# Patient Record
Sex: FEMALE | Race: WHITE | NOT HISPANIC OR LATINO | Employment: FULL TIME | URBAN - METROPOLITAN AREA
[De-identification: names, ages, dates, MRNs, and addresses within clinical notes are randomized per-mention and may not be internally consistent; named-entity substitution may affect disease eponyms.]

---

## 2017-01-25 ENCOUNTER — TELEPHONE (OUTPATIENT)
Dept: FAMILY MEDICINE | Facility: CLINIC | Age: 57
End: 2017-01-25

## 2017-01-25 NOTE — TELEPHONE ENCOUNTER
Panel Management Review      Patient has the following on her problem list:     Depression / Dysthymia review  PHQ-9 SCORE 12/19/2014 9/17/2015 8/3/2016   Total Score 24 - -   Total Score - 16 25      Patient is due for:  PHQ9      Composite cancer screening  Chart review shows that this patient is due/due soon for the following Pap Smear, Mammogram and Fecal Colorectal (FIT)  Summary:    Patient is due/failing the following:   FIT, MAMMOGRAM, PAP and PHQ9    Action needed:   Patient needs office visit for physical,mammo,FIT phq-9.    Type of outreach:    Sent letter.    Questions for provider review:    None                                                                                                                                    Karolina Banerjee ma       Chart routed to Care Team .

## 2017-01-25 NOTE — Clinical Note
January 25, 2017    Nataliia Flanagan  [address] [facility] [address]    Dear Nataliia    We care about your health and have reviewed your health plan. We have reviewed your medical conditions, medication list, and lab results and are making recommendations based on this review, to better manage your health.    You are in particular need of attention regarding:  - Your Depression  - Scheduling a Breast Cancer Screening (Mammography) [phone]  - Completing a Colon Cancer Screening (FIT) - Please complete FIT test a test to check for blood in your stool and mail completed test to clinic.  - Scheduling a Physical with a Cervical Cancer Screening (Pap Smear) age 64 and younger [phone]      Here is a list of Health Maintenance topics that are due now or due soon:  Health Maintenance Due   Topic Date Due     TETANUS IMMUNIZATION (SYSTEM ASSIGNED)  11/22/1978     ADVANCE DIRECTIVE PLANNING Q5 YRS (NO INBASKET)  11/22/1978     HEPATITIS C SCREENING  11/22/1978     PAP SCREENING Q3 YR (SYSTEM ASSIGNED)  11/22/1981     MAMMO SCREEN Q2 YR (SYSTEM ASSIGNED)  11/22/2000     DEPRESSION ACTION PLAN Q1 YR (NO INBASKET)  05/01/2015     FIT Q1 YR (NO INBASKET)  05/19/2015     INFLUENZA VACCINE (SYSTEM ASSIGNED)  09/01/2016     PHQ-9 Q6 MONTHS (NO INBASKET)  02/03/2017     We will be calling you in the next couple of weeks to help you schedule any appointments that are needed.  Please call us at [phone] (or use [employer]) to address the above recommendations.     Thank you for trusting [facility] and we appreciate the opportunity to serve you.  We look forward to supporting your healthcare needs in the future.    Healthy Regards,    [facility]

## 2017-01-25 NOTE — Clinical Note
February 9, 2017    Nataliia Flanagan  [address] [place] [facility] [place] [place] [address]      Dear Nataliia Flanagan,     We have tried to contact you about your health, but have been unable to reach you.  Please call us as soon as possible so we can provide you with the best care possible.  We will continue to check in with you throughout the year to complete these items of care, if you are not able to complete these items at this time.  If you would like to complete the missing items for your care, please contact us at [phone].    We recommend the following:  -schedule a MAMMOGRAM 1 in 8 women will develop invasive breast cancer during her lifetime and it is the most common non-skin cancer in American women.  EARLY detection, new treatments, and a better understanding of the disease have increased survival rates - the 5 year survival rate in the 1960s was 63% and today it is close to 90% .  Please disregard this reminder if you have had this exam elsewhere within the last year.  It would be helpful for us to have a copy of your mammogram report in our file so that we can best coordinate your care - please contact us with when your test was done so we can update your record. Please call [phone] to schedule your mammogram today.   -complete a FIT TEST a FIT test or Fecal Immunochemical Occult Blood Test is a take home stool sample kit.  It does not replace the colonoscopy for colorectal cancer screening, but it can detect hidden bleeding in the lower colon.  It does need to be repeated every year and if a positive result is obtained, you would be referred for a colonoscopy.  If you have completed either one of these tests at another facility, please have the records sent to our clinic so that we can best coordinate your care.   Due for a physical with a pap     Sincerely,     Your Care Team at [place]

## 2017-02-27 NOTE — PROGRESS NOTES
"  SUBJECTIVE:                                                    Nataliia Flanagan is a 56 year old female who presents to clinic today for the following health issues:    Rash/SORE     Onset: Age of 18    Description:   Location: lower left side of stomach  Character: round, raised, painful, draining, red, bleeding  Itching (Pruritis): no     Progression of Symptoms:  worsening and constant    Accompanying Signs & Symptoms:  Fever: no   Body aches or joint pain: no   Sore throat symptoms: no   Recent cold symptoms: no    History:     Previous similar rash: YES- Ongoing for years    Precipitating factors:   Exposure to similar rash: no   New exposures: None   Recent travel: no     Alleviating factors: none       Therapies Tried and outcome: Ointment cream (unknown type), no relief. Pressure/bandage, no relief.     Patient states that the lesion is from a tick bite when she was 18 years old. Continuously has had a red delma there - seen doctors, including dermatology, in the past and they \"ignored it,\" per her report. Has had bleeding off and on for several years, and now is constantly bleeding for the last year. No amount of pressure will stop the bleeding. She does not touch it, scratch it, or otherwise pick at the lesion. It hurts when it rubs on her clothing, but not at baseline. She endorses a family history of skin cancers. She does not take NSAIDs, ASA, or other blood thinners/anti-platelets.     Problem list and histories reviewed & adjusted, as indicated.  Additional history: as documented    Patient Active Problem List   Diagnosis     Chronic rhinitis     CARDIOVASCULAR SCREENING; LDL GOAL LESS THAN 160     Malnutrition (H)     Iron deficiency anemia     Distal radius fracture     Joint inflammation of wrist - left     Gastroesophageal reflux disease without esophagitis     Major depressive disorder, recurrent episode, moderate (H)     Past Surgical History   Procedure Laterality Date     Ent surgery       " "tonsilectomy     Genitourinary surgery       tubes tied     Endoscopy  12-9-14       Social History   Substance Use Topics     Smoking status: Never Smoker     Smokeless tobacco: Never Used     Alcohol use No     History reviewed. No pertinent family history.        ROS:  Constitutional, HEENT, cardiovascular, pulmonary, gi and gu systems are negative, except as otherwise noted.    OBJECTIVE:                                                    /77 (BP Location: Left arm, Patient Position: Chair, Cuff Size: Adult Regular)  Pulse 82  Temp 97.9  F (36.6  C) (Oral)  Ht 5' 4\" (1.626 m)  Wt 112 lb (50.8 kg)  SpO2 99%  BMI 19.22 kg/m2  Body mass index is 19.22 kg/(m^2).  GENERAL: healthy, alert and no distress  RESP: lungs clear to auscultation - no rales, rhonchi or wheezes  CV: regular rate and rhythm, normal S1 S2, no S3 or S4, no murmur, click or rub, no peripheral edema and peripheral pulses strong  ABDOMEN: soft, nontender, no hepatosplenomegaly, no masses and bowel sounds normal  SKIN: nodule - abdomen, 1.5cm by 1cm oval with dry, pink base with irregular borders. Nodular portion of lesion is actively bleeding from punctate areas, appears hypervascular.  PSYCH: mentation appears normal, affect normal/bright    Diagnostic Test Results:  Results for orders placed or performed in visit on 02/28/17 (from the past 24 hour(s))   CBC with platelets and differential   Result Value Ref Range    WBC 3.8 (L) 4.0 - 11.0 10e9/L    RBC Count 4.00 3.8 - 5.2 10e12/L    Hemoglobin 11.9 11.7 - 15.7 g/dL    Hematocrit 35.5 35.0 - 47.0 %    MCV 89 78 - 100 fl    MCH 29.8 26.5 - 33.0 pg    MCHC 33.5 31.5 - 36.5 g/dL    RDW 13.4 10.0 - 15.0 %    Platelet Count 276 150 - 450 10e9/L    Diff Method Automated Method     % Neutrophils 43.9 %    % Lymphocytes 39.2 %    % Monocytes 9.8 %    % Eosinophils 5.8 %    % Basophils 1.3 %    Absolute Neutrophil 1.7 1.6 - 8.3 10e9/L    Absolute Lymphocytes 1.5 0.8 - 5.3 10e9/L    Absolute Monocytes " 0.4 0.0 - 1.3 10e9/L    Absolute Eosinophils 0.2 0.0 - 0.7 10e9/L    Absolute Basophils 0.1 0.0 - 0.2 10e9/L        ASSESSMENT/PLAN:                                                        ICD-10-CM    1. Hemorrhage of skin lesion R23.3 CBC with platelets and differential     INR     DERMATOLOGY REFERRAL   2. Skin lesion L98.9 DERMATOLOGY REFERRAL     Given history and physical exam, malignancy must be ruled out. I believe the lesion needs elliptical excision, and I would prefer her to see dermatology for that to ensure adequate biopsy technique is used. Will check CBC and INR given the chronicity of the bleeding. No other bleeding noted on exam or by history.     Called [facility] on patient's behalf to ensure that she is seen in a reasonable time frame - appreciate their assistance.     See Patient Instructions    Lauren A. Engelmann, MD  [facility]

## 2017-02-28 ENCOUNTER — OFFICE VISIT (OUTPATIENT)
Dept: FAMILY MEDICINE | Facility: CLINIC | Age: 57
End: 2017-02-28
Payer: COMMERCIAL

## 2017-02-28 ENCOUNTER — TELEPHONE (OUTPATIENT)
Dept: FAMILY MEDICINE | Facility: CLINIC | Age: 57
End: 2017-02-28

## 2017-02-28 VITALS
OXYGEN SATURATION: 99 % | DIASTOLIC BLOOD PRESSURE: 77 MMHG | SYSTOLIC BLOOD PRESSURE: 140 MMHG | WEIGHT: 112 LBS | BODY MASS INDEX: 19.12 KG/M2 | HEIGHT: 64 IN | HEART RATE: 82 BPM | TEMPERATURE: 97.9 F

## 2017-02-28 DIAGNOSIS — R23.3 HEMORRHAGE OF SKIN LESION: Primary | ICD-10-CM

## 2017-02-28 DIAGNOSIS — L98.9 SKIN LESION: ICD-10-CM

## 2017-02-28 LAB
BASOPHILS # BLD AUTO: 0.1 10E9/L (ref 0–0.2)
BASOPHILS NFR BLD AUTO: 1.3 %
DIFFERENTIAL METHOD BLD: ABNORMAL
EOSINOPHIL # BLD AUTO: 0.2 10E9/L (ref 0–0.7)
EOSINOPHIL NFR BLD AUTO: 5.8 %
ERYTHROCYTE [DISTWIDTH] IN BLOOD BY AUTOMATED COUNT: 13.4 % (ref 10–15)
HCT VFR BLD AUTO: 35.5 % (ref 35–47)
HGB BLD-MCNC: 11.9 G/DL (ref 11.7–15.7)
INR PPP: 0.9 (ref 0.86–1.14)
LYMPHOCYTES # BLD AUTO: 1.5 10E9/L (ref 0.8–5.3)
LYMPHOCYTES NFR BLD AUTO: 39.2 %
MCH RBC QN AUTO: 29.8 PG (ref 26.5–33)
MCHC RBC AUTO-ENTMCNC: 33.5 G/DL (ref 31.5–36.5)
MCV RBC AUTO: 89 FL (ref 78–100)
MONOCYTES # BLD AUTO: 0.4 10E9/L (ref 0–1.3)
MONOCYTES NFR BLD AUTO: 9.8 %
NEUTROPHILS # BLD AUTO: 1.7 10E9/L (ref 1.6–8.3)
NEUTROPHILS NFR BLD AUTO: 43.9 %
PLATELET # BLD AUTO: 276 10E9/L (ref 150–450)
RBC # BLD AUTO: 4 10E12/L (ref 3.8–5.2)
WBC # BLD AUTO: 3.8 10E9/L (ref 4–11)

## 2017-02-28 PROCEDURE — 85610 PROTHROMBIN TIME: CPT | Performed by: FAMILY MEDICINE

## 2017-02-28 PROCEDURE — 85025 COMPLETE CBC W/AUTO DIFF WBC: CPT | Performed by: FAMILY MEDICINE

## 2017-02-28 PROCEDURE — 36415 COLL VENOUS BLD VENIPUNCTURE: CPT | Performed by: FAMILY MEDICINE

## 2017-02-28 PROCEDURE — 99213 OFFICE O/P EST LOW 20 MIN: CPT | Performed by: FAMILY MEDICINE

## 2017-02-28 ASSESSMENT — PAIN SCALES - GENERAL: PAINLEVEL: NO PAIN (0)

## 2017-02-28 NOTE — NURSING NOTE
"Chief Complaint   Patient presents with     Derm Problem       Initial /77 (BP Location: Left arm, Patient Position: Chair, Cuff Size: Adult Regular)  Pulse 82  Temp 97.9  F (36.6  C) (Oral)  Ht 5' 4\" (1.626 m)  Wt 112 lb (50.8 kg)  SpO2 99%  BMI 19.22 kg/m2 Estimated body mass index is 19.22 kg/(m^2) as calculated from the following:    Height as of this encounter: 5' 4\" (1.626 m).    Weight as of this encounter: 112 lb (50.8 kg).  Medication Reconciliation: complete   Lore Sharma MA      "

## 2017-02-28 NOTE — TELEPHONE ENCOUNTER
2/28/17- 1st call attempt, left voice mail for patient. Will postpone and try again in a few days. Need to schedule Physical and also needs PHQ9 assessment done over the phone.    Lore Sharma MA

## 2017-02-28 NOTE — TELEPHONE ENCOUNTER
2/28/17- Patient returned my phone call. Was able to get her to do the PHQ9, but declined to come in for anything at this time and I said I would call her back in a few weeks to try then.  Lore Sharma MA

## 2017-02-28 NOTE — LETTER
March 21, 2017    Nataliia Flanagan  [address] [facility] [address]      Dear Nataliia Flanagan,     We have tried to contact you about your health, but have been unable to reach you.  Please call us as soon as possible so we can provide you with the best care possible.  We will continue to check in with you throughout the year to complete these items of care, if you are not able to complete these items at this time.  If you would like to complete the missing items for your care, please contact us at [phone].    We recommend the following:  -schedule a MAMMOGRAM 1 in 8 women will develop invasive breast cancer during her lifetime and it is the most common non-skin cancer in American women.  EARLY detection, new treatments, and a better understanding of the disease have increased survival rates - the 5 year survival rate in the 1960s was 63% and today it is close to 90% .  Please disregard this reminder if you have had this exam elsewhere within the last year.  It would be helpful for us to have a copy of your mammogram report in our file so that we can best coordinate your care - please contact us with when your test was done so we can update your record. Please call [phone] to schedule your mammogram today.     Sincerely,     Your Care Team at [place]

## 2017-02-28 NOTE — MR AVS SNAPSHOT
After Visit Summary   2/28/2017    Nataliia Flanagan    MRN: [id number]           Patient Information     Date Of Birth          1960        Visit Information        Provider Department      2/28/2017 10:40 AM Engelmann, Lauren Anneliese, MD [facility]        Today's Diagnoses     Hemorrhage of skin lesion    -  1    Skin lesion          Care Instructions    Please go to [facility] tomorrow morning, March 1, at 8am for a biopsy  Their address is [address] [place] [address], [place] [address]          Follow-ups after your visit        Additional Services     DERMATOLOGY REFERRAL       Your provider has referred you to: N: [facility] [place] [phone]   [email or web address]    Please be aware that coverage of these services is subject to the terms and limitations of your health insurance plan.  Call member services at your health plan with any benefit or coverage questions.      Please bring the following with you to your appointment:    (1) Any X-Rays, CTs or MRIs which have been performed.  Contact the facility where they were done to arrange for  prior to your scheduled appointment.    (2) List of current medications  (3) This referral request   (4) Any documents/labs given to you for this referral                  Who to contact     If you have questions or need follow up information about today's clinic visit or your schedule please contact [facility] directly at [phone].  Normal or non-critical lab and imaging results will be communicated to you by MyChart, letter or phone within 4 business days after the clinic has received the results. If you do not hear from us within 7 days, please contact the clinic through MyChart or phone. If you have a critical or abnormal lab result, we will notify you by phone as soon as possible.  Submit refill requests through [employer] or call your pharmacy and  "they will forward the refill request to us. Please allow 3 business days for your refill to be completed.          Additional Information About Your Visit        [employer] Information     [employer] gives you secure access to your electronic health record. If you see a primary care provider, you can also send messages to your care team and make appointments. If you have questions, please call your primary care clinic.  If you do not have a primary care provider, please call [phone] and they will assist you.        Care EveryWhere ID     This is your Care EveryWhere ID. This could be used by other organizations to access your [place] medical records  [id number]        Your Vitals Were     Pulse Temperature Height Pulse Oximetry BMI (Body Mass Index)       82 97.9  F (36.6  C) (Oral) 5' 4\" (1.626 m) 99% 19.22 kg/m2        Blood Pressure from Last 3 Encounters:   02/28/17 140/77   08/03/16 129/74   09/17/15 124/75    Weight from Last 3 Encounters:   02/28/17 112 lb (50.8 kg)   08/03/16 104 lb (47.2 kg)   09/17/15 94 lb (42.6 kg)              We Performed the Following     CBC with platelets and differential     DERMATOLOGY REFERRAL     INR        Primary Care Provider Office Phone # Fax #    Maikel Lopez -[phone]       [address] [facility] [address]        Thank you!     Thank you for choosing [facility]  for your care. Our goal is always to provide you with excellent care. Hearing back from our patients is one way we can continue to improve our services. Please take a few minutes to complete the written survey that you may receive in the mail after your visit with us. Thank you!             Your Updated Medication List - Protect others around you: Learn how to safely use, store and throw away your medicines at www.disposemymeds.org.          This list is accurate as of: 2/28/17 11:21 AM.  Always use your most recent med list.    "                Brand Name Dispense Instructions for use    FLUoxetine 40 MG capsule    PROZAC    90 capsule    3 capsules  per day       pseudoePHEDrine 120 MG 12 hr tablet    SUDAFED    180 tablet    Take 1 tablet (120 mg) by mouth every 12 hours       traZODone 100 MG tablet    DESYREL    90 tablet    Take 1 tablet (100 mg) by mouth nightly as needed for sleep

## 2017-02-28 NOTE — PATIENT INSTRUCTIONS
Please go to [facility] tomorrow morning, March 1, at 8am for a biopsy  Their address is [address] [place] [address] [address], [place] [address]

## 2017-03-01 ASSESSMENT — PATIENT HEALTH QUESTIONNAIRE - PHQ9: SUM OF ALL RESPONSES TO PHQ QUESTIONS 1-9: 18

## 2017-03-01 NOTE — PROGRESS NOTES
Unremarkable labs. WBCs slightly below normal, but no other signs of leukopenia or other blood dyscrasias. Will monitor.     Lauren Engelmann, MD

## 2017-06-05 ENCOUNTER — TELEPHONE (OUTPATIENT)
Dept: FAMILY MEDICINE | Facility: CLINIC | Age: 57
End: 2017-06-05

## 2017-06-05 DIAGNOSIS — Z12.39 SCREENING FOR BREAST CANCER: Primary | ICD-10-CM

## 2017-06-05 NOTE — LETTER
Merly 15, 2017    Nataliia Flanagan  [address] [facility] [address]      Dear Nataliia Flanagan,     We have tried to contact you about your health, but have been unable to reach you.  Please call us as soon as possible so we can provide you with the best care possible.  We will continue to check in with you throughout the year to complete these items of care, if you are not able to complete these items at this time.  If you would like to complete the missing items for your care, please contact us at [phone].    We recommend the following:  -schedule a MAMMOGRAM 1 in 8 women will develop invasive breast cancer during her lifetime and it is the most common non-skin cancer in American women.  EARLY detection, new treatments, and a better understanding of the disease have increased survival rates - the 5 year survival rate in the 1960s was 63% and today it is close to 90% .  Please disregard this reminder if you have had this exam elsewhere within the last year.  It would be helpful for us to have a copy of your mammogram report in our file so that we can best coordinate your care - please contact us with when your test was done so we can update your record. Please call [phone] to schedule your mammogram today.   -schedule a PAP SMEAR EXAM which is due.  Please disregard this reminder if you have had this exam elsewhere within the last year.  It would be helpful for us to have a copy of your recent pap smear report in our file so that we can best coordinate your care.    Sincerely,     Your Care Team at [place]

## 2017-06-05 NOTE — TELEPHONE ENCOUNTER
Panel Management Review      Patient has the following on her problem list:     Depression / Dysthymia review  PHQ-9 SCORE 9/17/2015 8/3/2016 2/28/2017   Total Score - - -   Total Score 16 25 18      Patient is due for:  None      Composite cancer screening  Chart review shows that this patient is due/due soon for the following Pap Smear, Mammogram and Fecal Colorectal (FIT)  Summary:    Patient is due/failing the following:   FIT, MAMMOGRAM and PAP    Action needed:   Patient needs referral/order: Mammo, FIT and OV for Pap    Type of outreach:    Sent letter.    Questions for provider review:    None                                                                                                                                    Lore Sharma MA       Chart routed to Care Team .

## 2017-06-05 NOTE — LETTER
June 5, 2017    Natali[facility] Flanagan  [address] [address]    Dear Nataliia    We care about your health and have reviewed your health plan. We have reviewed your medical conditions, medication list, and lab results and are making recommendations based on this review, to better manage your health.    You are in particular need of attention regarding:  - Scheduling a Breast Cancer Screening (Mammography) [phone]  - Completing a Colon Cancer Screening (FIT) - Please complete FIT test which can be picked up at the clinic and mail completed test to clinic.  - Scheduling a Physical with a Cervical Cancer Screening (Pap Smear) age 64 and younger [phone]      Here is a list of Health Maintenance topics that are due now or due soon:  Health Maintenance Due   Topic Date Due     TETANUS IMMUNIZATION (SYSTEM ASSIGNED)  11/22/1978     ADVANCE DIRECTIVE PLANNING Q5 YRS  11/22/1978     HEPATITIS C SCREENING  11/22/1978     PAP SCREENING Q3 YR (SYSTEM ASSIGNED)  11/22/1981     MAMMO SCREEN Q2 YR (SYSTEM ASSIGNED)  11/22/2000     DEPRESSION ACTION PLAN Q1 YR  05/01/2015     FIT Q1 YR  05/19/2015     LIPID SCREEN Q5 YR FEMALE (SYSTEM ASSIGNED)  07/03/2017     We will be calling you in the next couple of weeks to help you schedule any appointments that are needed.  Please call us at [phone] (or use [employer]) to address the above recommendations.     Thank you for trusting [facility] and we appreciate the opportunity to serve you.  We look forward to supporting your healthcare needs in the future.    Healthy Regards,    Your Care Team

## 2017-07-10 DIAGNOSIS — G47.00 PERSISTENT INSOMNIA: Primary | ICD-10-CM

## 2017-07-10 RX ORDER — TRAZODONE HYDROCHLORIDE 100 MG/1
TABLET ORAL
Qty: 90 TABLET | Refills: 1 | Status: SHIPPED | OUTPATIENT
Start: 2017-07-10 | End: 2018-01-05

## 2017-07-10 NOTE — TELEPHONE ENCOUNTER
Routing refill request to provider for review/approval because:  PHQ 9 > 5  Overdue to be seen  Sri Moran RN [facility] Triage.

## 2017-07-10 NOTE — TELEPHONE ENCOUNTER
traZODone (DESYREL) 100 MG tablet       Last Written Prescription Date: 5-2-16  Last Fill Quantity: 90; # refills: 3  Last Office Visit with [facility], [facility] or [facility] prescribing provider:  2-28-17        Last PHQ-9 score on record=   PHQ-9 SCORE 2/28/2017   Total Score -   Total Score 18       Lab Results   Component Value Date    AST 48 06/19/2014     Lab Results   Component Value Date    ALT 39 06/19/2014

## 2017-08-28 NOTE — PROGRESS NOTES
SUBJECTIVE:   Nataliia Flanagan is a 56 year old female who presents to clinic today for the following health issues:    Depression Followup    Status since last visit: Worsened     See PHQ-9 for current symptoms.  Other associated symptoms: Seasonal depression makes it worse    Complicating factors:   Significant life event:  Yes-  Mother has Alzheimer's and is having a hard time with that.   Current substance abuse:  None  Anxiety or Panic symptoms:  Yes-  Stated she got Panic from Omeprazole. Has some panic attacks when she goes to work.    PHQ-9  English  PHQ-9   Any Language      Amount of exercise or physical activity: 6-7 days/week for an average of greater than 60 minutes    Problems taking medications regularly: No    Medication side effects: None       Diet: regular (no restrictions)    PHQ-9 SCORE 2/28/2017 9/1/2017 9/1/2017   Total Score - - -   Total Score 18 23 24     Patient reports worsening of depression symptoms. Mom is sick with Alzheimers. Denies SI or plan of self harm.       Musculoskeletal problem/pain      Duration: Months    Description  Location: L wrist     Intensity:  moderate    Accompanying signs and symptoms: none    History  Previous similar problem: YES- for years  Previous evaluation:  none    Precipitating or alleviating factors:  Trauma or overuse: YES- lifts heavy things  Aggravating factors include: exercise and overuse    Therapies tried and outcome: rest/inactivity, heat, ice, immobilization, acetaminophen and Ibuprofen    Also complaining of epigastric pain, was supposed to have repeat EGD for history of gastric ulcer but never did. Cant tolerate PPI because she thinks it caused a panic attack.     Problem list and histories reviewed & adjusted, as indicated.  Additional history: as documented    Patient Active Problem List   Diagnosis     Chronic rhinitis     CARDIOVASCULAR SCREENING; LDL GOAL LESS THAN 160     Malnutrition (H)     Iron deficiency anemia     Distal radius  "fracture     Joint inflammation of wrist - left     Gastroesophageal reflux disease without esophagitis     Major depressive disorder, recurrent episode, moderate (H)     Past Surgical History:   Procedure Laterality Date     ENDOSCOPY  12-9-14     ENT SURGERY      tonsilectomy     GENITOURINARY SURGERY      tubes tied       Social History   Substance Use Topics     Smoking status: Never Smoker     Smokeless tobacco: Never Used     Alcohol use No     History reviewed. No pertinent family history.          Reviewed and updated as needed this visit by clinical staff     Reviewed and updated as needed this visit by Provider         ROS:  Constitutional, HEENT, cardiovascular, pulmonary, gi and gu systems are negative, except as otherwise noted.      OBJECTIVE:   /77 (BP Location: Right arm, Patient Position: Chair, Cuff Size: Adult Regular)  Pulse 73  Temp 97  F (36.1  C) (Oral)  Wt 106 lb (48.1 kg)  SpO2 100%  BMI 18.19 kg/m2  Body mass index is 18.19 kg/(m^2).  GENERAL: alert, no distress and frail  NECK: no adenopathy, no asymmetry, masses, or scars and thyroid normal to palpation  RESP: lungs clear to auscultation - no rales, rhonchi or wheezes  CV: regular rate and rhythm, normal S1 S2, no S3 or S4, no murmur, click or rub, no peripheral edema and peripheral pulses strong  ABDOMEN: tenderness epigastric, no organomegaly or masses, bowel sounds normal and no palpable or pulsatile masses  MS: LUE exam shows no erythema, induration, or nodules, no evidence of joint effusion and pain with active ROM.   PSYCH: MENTAL STATUS EXAM  Appearance: appropriate, well-groomed   Attitude: cooperative, engaged in conversation   Behavior: normal, no psychomotor agitation or retardation   Eye Contact: normal  Speech: normal kallie, normal content, normal volume   Orientation: oriented to person, place, time and situation  Mood: \"sad\"  Affect: tearful, mood-congruent   Thought Process: tangential but redirectable "   Suicidal Ideation: No passive or active SI or plan, no thoughts of self-harm, reports children as protective factor   Hallucination: no A or V hallucinations      Diagnostic Test Results:  Results for orders placed or performed in visit on 02/28/17   CBC with platelets and differential   Result Value Ref Range    WBC 3.8 (L) 4.0 - 11.0 10e9/L    RBC Count 4.00 3.8 - 5.2 10e12/L    Hemoglobin 11.9 11.7 - 15.7 g/dL    Hematocrit 35.5 35.0 - 47.0 %    MCV 89 78 - 100 fl    MCH 29.8 26.5 - 33.0 pg    MCHC 33.5 31.5 - 36.5 g/dL    RDW 13.4 10.0 - 15.0 %    Platelet Count 276 150 - 450 10e9/L    Diff Method Automated Method     % Neutrophils 43.9 %    % Lymphocytes 39.2 %    % Monocytes 9.8 %    % Eosinophils 5.8 %    % Basophils 1.3 %    Absolute Neutrophil 1.7 1.6 - 8.3 10e9/L    Absolute Lymphocytes 1.5 0.8 - 5.3 10e9/L    Absolute Monocytes 0.4 0.0 - 1.3 10e9/L    Absolute Eosinophils 0.2 0.0 - 0.7 10e9/L    Absolute Basophils 0.1 0.0 - 0.2 10e9/L   INR   Result Value Ref Range    INR 0.90 0.86 - 1.14       ASSESSMENT/PLAN:         ICD-10-CM    1. Major depressive disorder, recurrent episode, moderate (H) F33.1 ARIPiprazole (ABILIFY) 2 MG tablet   2. Arthritis of left wrist M19.032 traMADol (ULTRAM) 50 MG tablet   3. Gastric ulcer without hemorrhage or perforation, unspecified chronicity K25.9 GASTROENTEROLOGY ADULT REF CONSULT ONLY     famotidine (PEPCID) 40 MG tablet     Add Abilify as adjunct to SSRI. Avoid Wellbutrin due to prior adverse reaction.   Tramadol for wrist pain, avoid NSAID due to history of gastric ulcer.   H2 blocker for epigastric pain, referral to GI.     See Patient Instructions    Lauren A. Engelmann, MD  [facility]

## 2017-08-29 ENCOUNTER — TELEPHONE (OUTPATIENT)
Dept: FAMILY MEDICINE | Facility: CLINIC | Age: 57
End: 2017-08-29

## 2017-08-29 NOTE — TELEPHONE ENCOUNTER
PA is needed for the medication, Fluoxetine Hcl 40 MG Capsule.     Insurance has been called.  Alternatives that are covered are: DOES NOT NEED A PA. CALLED PHARMACY AND THEY SAID THE MEDICATION IS COVERED AND SHE PAYS A SMALL CO PAY. Closed encounter.    Lore Sharma MA        Would you like to start PA or Rx alternative medication?    If PA, please list all medications this patient has tried along with any contraindications that may have been experienced in the past. Thank you.    Pharmacy:    [facility]/PHARMACY #[id number] [facility]   Phone: [phone]      Insurance Plan: [facility] OUT OF STATE  Phone: [phone]   Pt ID: [id number]   Group:

## 2017-09-01 ENCOUNTER — OFFICE VISIT (OUTPATIENT)
Dept: FAMILY MEDICINE | Facility: CLINIC | Age: 57
End: 2017-09-01
Payer: COMMERCIAL

## 2017-09-01 VITALS
OXYGEN SATURATION: 100 % | HEART RATE: 73 BPM | DIASTOLIC BLOOD PRESSURE: 77 MMHG | SYSTOLIC BLOOD PRESSURE: 141 MMHG | BODY MASS INDEX: 18.19 KG/M2 | TEMPERATURE: 97 F | WEIGHT: 106 LBS

## 2017-09-01 DIAGNOSIS — K25.9 GASTRIC ULCER WITHOUT HEMORRHAGE OR PERFORATION, UNSPECIFIED CHRONICITY: ICD-10-CM

## 2017-09-01 DIAGNOSIS — M19.032 ARTHRITIS OF LEFT WRIST: ICD-10-CM

## 2017-09-01 DIAGNOSIS — F33.1 MAJOR DEPRESSIVE DISORDER, RECURRENT EPISODE, MODERATE (H): Primary | ICD-10-CM

## 2017-09-01 PROCEDURE — 99214 OFFICE O/P EST MOD 30 MIN: CPT | Performed by: FAMILY MEDICINE

## 2017-09-01 RX ORDER — TRAMADOL HYDROCHLORIDE 50 MG/1
50 TABLET ORAL EVERY 8 HOURS PRN
Qty: 30 TABLET | Refills: 0 | Status: SHIPPED | OUTPATIENT
Start: 2017-09-01 | End: 2017-09-18

## 2017-09-01 RX ORDER — ARIPIPRAZOLE 2 MG/1
2 TABLET ORAL AT BEDTIME
Qty: 30 TABLET | Refills: 0 | Status: SHIPPED | OUTPATIENT
Start: 2017-09-01 | End: 2018-03-15 | Stop reason: DRUGHIGH

## 2017-09-01 RX ORDER — FAMOTIDINE 40 MG/1
40 TABLET, FILM COATED ORAL AT BEDTIME
Qty: 30 TABLET | Refills: 3 | Status: SHIPPED | OUTPATIENT
Start: 2017-09-01 | End: 2017-12-18

## 2017-09-01 ASSESSMENT — PATIENT HEALTH QUESTIONNAIRE - PHQ9: SUM OF ALL RESPONSES TO PHQ QUESTIONS 1-9: 24

## 2017-09-01 ASSESSMENT — PAIN SCALES - GENERAL: PAINLEVEL: MODERATE PAIN (4)

## 2017-09-01 NOTE — MR AVS SNAPSHOT
After Visit Summary   9/1/2017    Nataliia Flanagan    MRN: [id number]           Patient Information     Date Of Birth          1960        Visit Information        Provider Department      9/1/2017 2:00 PM Engelmann, Lauren Anneliese, MD [facility]        Today's Diagnoses     Major depressive disorder, recurrent episode, moderate (H)    -  1    Arthritis of left wrist        Gastric ulcer without hemorrhage or perforation, unspecified chronicity          Care Instructions    Call [facility] to set up an appointment to discuss your ulcer.   Come back and see me in 2 weeks to discuss your medication.           Follow-ups after your visit        Additional Services     GASTROENTEROLOGY ADULT REF CONSULT ONLY       Preferred Location: [facility] [phone]      Please be aware that coverage of these services is subject to the terms and limitations of your health insurance plan.  Call member services at your health plan with any benefit or coverage questions.  Any procedures must be performed at a [facility] facility OR coordinated by your clinic's referral office.    Please bring the following with you to your appointment:    (1) Any X-Rays, CTs or MRIs which have been performed.  Contact the facility where they were done to arrange for  prior to your scheduled appointment.    (2) List of current medications   (3) This referral request   (4) Any documents/labs given to you for this referral                  Your next 10 appointments already scheduled     Sep 18, 2017  2:00 PM CDT   SHORT with Lauren Anneliese Engelmann, MD   [facility] ([facility])    [address] [place] [id number] [phone]              Who to contact     If you have questions or need follow up information about today's clinic visit or your schedule please contact [facility] directly at  [phone].  Normal or non-critical lab and imaging results will be communicated to you by [employer], letter or phone within 4 business days after the clinic has received the results. If you do not hear from us within 7 days, please contact the clinic through [employer] or phone. If you have a critical or abnormal lab result, we will notify you by phone as soon as possible.  Submit refill requests through [employer] or call your pharmacy and they will forward the refill request to us. Please allow 3 business days for your refill to be completed.          Additional Information About Your Visit        [employer] Information     [employer] gives you secure access to your electronic health record. If you see a primary care provider, you can also send messages to your care team and make appointments. If you have questions, please call your primary care clinic.  If you do not have a primary care provider, please call [phone] and they will assist you.        Care EveryWhere ID     This is your Care EveryWhere ID. This could be used by other organizations to access your [facility] medical records  [id number]        Your Vitals Were     Pulse Temperature Pulse Oximetry BMI (Body Mass Index)          73 97  F (36.1  C) (Oral) 100% 18.19 kg/m2         Blood Pressure from Last 3 Encounters:   09/01/17 141/77   02/28/17 140/77   08/03/16 129/74    Weight from Last 3 Encounters:   09/01/17 106 lb (48.1 kg)   02/28/17 112 lb (50.8 kg)   08/03/16 104 lb (47.2 kg)              We Performed the Following     GASTROENTEROLOGY ADULT REF CONSULT ONLY          Today's Medication Changes          These changes are accurate as of: 9/1/17  2:32 PM.  If you have any questions, ask your nurse or doctor.               Start taking these medicines.        Dose/Directions    ARIPiprazole 2 MG tablet   Commonly known as:  ABILIFY   Used for:  Major depressive disorder, recurrent episode, moderate (H)   Started by:  Engelmann, Lauren Anneliese, MD         Dose:  2 mg   Take 1 tablet (2 mg) by mouth At Bedtime   Quantity:  30 tablet   Refills:  0       famotidine 40 MG tablet   Commonly known as:  PEPCID   Used for:  Gastric ulcer without hemorrhage or perforation, unspecified chronicity   Started by:  Engelmann, Lauren Anneliese, MD        Dose:  40 mg   Take 1 tablet (40 mg) by mouth At Bedtime   Quantity:  30 tablet   Refills:  3       traMADol 50 MG tablet   Commonly known as:  ULTRAM   Used for:  Arthritis of left wrist   Started by:  Engelmann, Lauren Anneliese, MD        Dose:  50 mg   Take 1 tablet (50 mg) by mouth every 8 hours as needed for pain maximum 3 tablet(s) per day   Quantity:  30 tablet   Refills:  0            Where to get your medicines      These medications were sent to [facility]/pharmacy #[id number] - [address] AT CORNER OF [address] [place] [id number]     Phone:  [phone]     ARIPiprazole 2 MG tablet    famotidine 40 MG tablet         Some of these will need a paper prescription and others can be bought over the counter.  Ask your nurse if you have questions.     Bring a paper prescription for each of these medications     traMADol 50 MG tablet                Primary Care Provider    None       No address on file        Equal Access to Services     TATE MARTINEZ : Kathleen bassett Soanahi, waaxda luqadaha, qaybta kaalmada adekemiyada, nasim posadas. So [facility] [phone].    ATENCIÓN: Si habla español, tiene a ordoñez disposición servicios gratveenaos de asistencia lingüística. Norm al [phone].    We comply with applicable federal civil rights laws and [place] laws. We do not discriminate on the basis of race, color, national origin, age, disability sex, sexual orientation or gender identity.            Thank you!     Thank you for choosing [facility]  for your care. Our goal is always to provide you with excellent care. Hearing back from our patients is  one way we can continue to improve our services. Please take a few minutes to complete the written survey that you may receive in the mail after your visit with us. Thank you!             Your Updated Medication List - Protect others around you: Learn how to safely use, store and throw away your medicines at www.disposemymeds.org.          This list is accurate as of: 9/1/17  2:32 PM.  Always use your most recent med list.                   Brand Name Dispense Instructions for use Diagnosis    ARIPiprazole 2 MG tablet    ABILIFY    30 tablet    Take 1 tablet (2 mg) by mouth At Bedtime    Major depressive disorder, recurrent episode, moderate (H)       famotidine 40 MG tablet    PEPCID    30 tablet    Take 1 tablet (40 mg) by mouth At Bedtime    Gastric ulcer without hemorrhage or perforation, unspecified chronicity       FLUoxetine 40 MG capsule    PROZAC    60 capsule    Take 2 capsules (80 mg) by mouth daily    Major depressive disorder, recurrent episode, moderate (H)       pseudoePHEDrine 120 MG 12 hr tablet    SUDAFED    180 tablet    Take 1 tablet (120 mg) by mouth every 12 hours    Chronic rhinitis       traMADol 50 MG tablet    ULTRAM    30 tablet    Take 1 tablet (50 mg) by mouth every 8 hours as needed for pain maximum 3 tablet(s) per day    Arthritis of left wrist       traZODone 100 MG tablet    DESYREL    90 tablet    TAKE 1 TABLET BY MOUTH AT BEDTIME AS NEEDED FOR SLEEP    Persistent insomnia

## 2017-09-01 NOTE — PATIENT INSTRUCTIONS
Call [facility] to set up an appointment to discuss your ulcer.   Come back and see me in 2 weeks to discuss your medication.

## 2017-09-01 NOTE — NURSING NOTE
"Chief Complaint   Patient presents with     Depression       Initial /77 (BP Location: Right arm, Patient Position: Chair, Cuff Size: Adult Regular)  Pulse 73  Temp 97  F (36.1  C) (Oral)  Wt 106 lb (48.1 kg)  SpO2 100%  BMI 18.19 kg/m2 Estimated body mass index is 18.19 kg/(m^2) as calculated from the following:    Height as of 2/28/17: 5' 4\" (1.626 m).    Weight as of this encounter: 106 lb (48.1 kg).  Medication Reconciliation: complete   Lore Sharma MA      "

## 2017-09-13 ENCOUNTER — TRANSFERRED RECORDS (OUTPATIENT)
Dept: HEALTH INFORMATION MANAGEMENT | Facility: CLINIC | Age: 57
End: 2017-09-13

## 2017-09-13 NOTE — PROGRESS NOTES
SUBJECTIVE:   Nataliia Flanagan is a 56 year old female who presents to clinic today for the following health issues:    Depression Followup    Status since last visit: Improved, panic attacks are not as bad as they were before.    See PHQ-9 for current symptoms.  Other associated symptoms: Vivid dreams    Complicating factors:   Significant life event:  Yes -  Mom has Alzheimer's, working over time and is exhausted.   Current substance abuse:  None  Anxiety or Panic symptoms:  Yes- not to the degree they were before.    PHQ-9  English  PHQ-9   Any Language      Amount of exercise or physical activity: 6-7 days/week for an average of greater than 60 minutes    Problems taking medications regularly: No    Medication side effects: none    Diet: regular (no restrictions)    Patient started on Abilify and notes improvement - she would like to increase dose.     Also requesting refills of tramadol for traumatic arthritis of wrist. She can't take NSAIDs due to history of gastric ulcers. Pain is worse recently as she is doing a lot of overtime work and using the wrist a lot.     Problem list and histories reviewed & adjusted, as indicated.  Additional history: as documented    Patient Active Problem List   Diagnosis     Chronic rhinitis     CARDIOVASCULAR SCREENING; LDL GOAL LESS THAN 160     Malnutrition (H)     Iron deficiency anemia     Distal radius fracture     Joint inflammation of wrist - left     Gastroesophageal reflux disease without esophagitis     Major depressive disorder, recurrent episode, moderate (H)     Traumatic arthritis of left wrist     Past Surgical History:   Procedure Laterality Date     ENDOSCOPY  12-9-14     ENT SURGERY      tonsilectomy     GENITOURINARY SURGERY      tubes tied       Social History   Substance Use Topics     Smoking status: Never Smoker     Smokeless tobacco: Never Used     Alcohol use No     History reviewed. No pertinent family history.        Reviewed and updated as needed  this visit by clinical staffTobacco  Allergies  Meds  Med Hx  Surg Hx  Fam Hx  Soc Hx      Reviewed and updated as needed this visit by Provider         ROS:  Constitutional, HEENT, cardiovascular, pulmonary, gi and gu systems are negative, except as otherwise noted.      OBJECTIVE:   /85 (BP Location: Left arm, Patient Position: Chair, Cuff Size: Adult Regular)  Pulse 93  Temp 97.9  F (36.6  C) (Oral)  Wt 105 lb (47.6 kg)  SpO2 98%  BMI 18.02 kg/m2  Body mass index is 18.02 kg/(m^2).  GENERAL: alert, no distress and thin  RESP: lungs clear to auscultation - no rales, rhonchi or wheezes  CV: regular rate and rhythm, normal S1 S2, no S3 or S4, no murmur, click or rub, no peripheral edema and peripheral pulses strong  MS: arthritis of the L wrist  PSYCH: mentation appears normal, affect normal/bright, judgement and insight intact, appearance well groomed and no SI or thoughts of self harm    Diagnostic Test Results:  No results found for this or any previous visit (from the past 24 hour(s)).    ASSESSMENT/PLAN:       ICD-10-CM    1. Major depressive disorder, recurrent episode, moderate (H) F33.1 ARIPiprazole (ABILIFY) 5 MG tablet     predniSONE (DELTASONE) 20 MG tablet   2. Traumatic arthritis of left wrist M12.532 traMADol (ULTRAM) 50 MG tablet     predniSONE (DELTASONE) 20 MG tablet     Increase Abilify to 5mg qhs, ok to use 2mg tablets (2 tabs qhs for total of 4mg) and then increase to 5mg.     Trial of prednisone 20mg x 5 days for wrist pain, and continue tramadol as needed.     Follow up in 4-6 weeks for med check/mood.     See Patient Instructions    Lauren A. Engelmann, MD  [facility]

## 2017-09-18 ENCOUNTER — OFFICE VISIT (OUTPATIENT)
Dept: FAMILY MEDICINE | Facility: CLINIC | Age: 57
End: 2017-09-18
Payer: COMMERCIAL

## 2017-09-18 VITALS
WEIGHT: 105 LBS | HEART RATE: 93 BPM | DIASTOLIC BLOOD PRESSURE: 85 MMHG | BODY MASS INDEX: 18.02 KG/M2 | TEMPERATURE: 97.9 F | SYSTOLIC BLOOD PRESSURE: 139 MMHG | OXYGEN SATURATION: 98 %

## 2017-09-18 DIAGNOSIS — M12.532 TRAUMATIC ARTHRITIS OF LEFT WRIST: ICD-10-CM

## 2017-09-18 DIAGNOSIS — F33.1 MAJOR DEPRESSIVE DISORDER, RECURRENT EPISODE, MODERATE (H): Primary | ICD-10-CM

## 2017-09-18 PROCEDURE — 99214 OFFICE O/P EST MOD 30 MIN: CPT | Performed by: FAMILY MEDICINE

## 2017-09-18 RX ORDER — ARIPIPRAZOLE 5 MG/1
5 TABLET ORAL AT BEDTIME
Qty: 30 TABLET | Refills: 1 | Status: SHIPPED | OUTPATIENT
Start: 2017-09-18 | End: 2017-11-15

## 2017-09-18 RX ORDER — TRAMADOL HYDROCHLORIDE 50 MG/1
50 TABLET ORAL EVERY 6 HOURS PRN
Qty: 45 TABLET | Refills: 0 | Status: SHIPPED | OUTPATIENT
Start: 2017-09-18 | End: 2017-10-09

## 2017-09-18 RX ORDER — PREDNISONE 20 MG/1
20 TABLET ORAL DAILY
Qty: 5 TABLET | Refills: 0 | Status: SHIPPED | OUTPATIENT
Start: 2017-09-18 | End: 2018-03-15

## 2017-09-18 ASSESSMENT — PATIENT HEALTH QUESTIONNAIRE - PHQ9: SUM OF ALL RESPONSES TO PHQ QUESTIONS 1-9: 16

## 2017-09-18 ASSESSMENT — PAIN SCALES - GENERAL: PAINLEVEL: SEVERE PAIN (6)

## 2017-09-18 NOTE — NURSING NOTE
"Chief Complaint   Patient presents with     Depression     PHQ-9       Initial /85 (BP Location: Left arm, Patient Position: Chair, Cuff Size: Adult Regular)  Pulse 93  Temp 97.9  F (36.6  C) (Oral)  Wt 105 lb (47.6 kg)  SpO2 98%  BMI 18.02 kg/m2 Estimated body mass index is 18.02 kg/(m^2) as calculated from the following:    Height as of 2/28/17: 5' 4\" (1.626 m).    Weight as of this encounter: 105 lb (47.6 kg).  Medication Reconciliation: complete   Lore Sharma MA      "

## 2017-09-18 NOTE — MR AVS SNAPSHOT
After Visit Summary   9/18/2017    Nataliia Flanagan    MRN: [id number]           Patient Information     Date Of Birth          1960        Visit Information        Provider Department      9/18/2017 2:00 PM Engelmann, Lauren Anneliese, MD [facility]        Today's Diagnoses     Major depressive disorder, recurrent episode, moderate (H)    -  1    Traumatic arthritis of left wrist        Arthritis of left wrist          Care Instructions    Start taking prednisone for your wrist - you will take it for 5 days.   Increase the dose of Abilify to 4mg (2 tablets) per day, then  the new prescription for 5mg tablets.   I will refill your tramadol.   Come back and see me in 4-6 weeks.       Living with Osteoarthritis    Osteoarthritis is a chronic disease and the most common type of arthritis. But it doesn t have to keep you from leading an active life. You can help control symptoms by exercising and losing weight if you are overweight. Using special tools also helps make life easier. Be sure to see your healthcare provider for scheduled checkups and lab work. If you have questions or concerns between office visits, call your healthcare provider's office.  Make exercise part of your life  Gentle exercise can help lessen your pain. Keep the following in mind:    Choose exercises that improve joint motion and make your muscles stronger. Your healthcare provider or a physical therapist may suggest a few.    Stretching and flexibility activities such as yoga and son chi may improve pain and joint motion.    Try low-impact sports, such as walking, biking, or doing exercises in a warm pool.    Most people should exercise for at least 30 minutes a day on most days of the week. This can be broken up into shorter periods throughout the day.    Don t push yourself too hard at first. Slowly build up over time.    Make sure you warm up for 5 to 10 minutes before you exercise.    If pain  and stiffness increase, don't exercise as hard or as long.  Watch your weight  If you weigh more than you should, your weight-bearing joints are under extra pressure. This makes your symptoms worse. To reduce pain and stiffness, try shedding a few of those extra pounds. The tips below may help:    Start a weight-loss program with the help of your healthcare provider.    Ask your friends and family for support.    Join a weight-loss group.  Use special tools  Even simple tasks can be hard to do when your joints hurt. Special tools called assistive devices can make things easier by reducing strain and protecting your joints. Ask your healthcare provider where to find these and other helpful tools:    Long-handled reachers or grabbers    Jar openers and button threaders    Large  for pencils, garden tools, and other hand-held objects  Use mobility and other aids  People with arthritis and other joint problems often use mobility aids to help with walking. For example, they may use canes or walkers. They may also use splints or braces to support joints. Talk with your healthcare provider or physical therapist about these aids:    A cane to reduce knee or hip pain and help prevent falls    Splints for your wrists or other joints    A brace to support a weak knee joint    Orthotics for toe and foot involvement  Medical and surgical treatments  Discuss medical treatments with your healthcare provider to help reduce your pain and improve joint mobility.    Topical medicines such as lidocaine, capsaicin, and diclofenac gel    Oral medicines such as acetaminophen, NSAIDs (nonsteroidal anti-inflammatory medicines) such as ibuprofen and naproxen, or opioid narcotics    Injections in affected joints such as corticosteroids in various joints, or hyaluronic acid in the knee joints    Surgical repair or surgical joint replacement with artificial joints    Complementary therapies such as heat and cold treatment, massage,  acupuncture, supplements, cognitive training, meditation, and others. Discuss these options with your healthcare provider.  Date Last Reviewed: 2/14/2016 2000-2017 [employer]. [address] [place] [address], [place] [address]. All rights reserved. This information is not intended as a substitute for professional medical care. Always follow your healthcare professional's instructions.                Follow-ups after your visit        Who to contact     If you have questions or need follow up information about today's clinic visit or your schedule please contact [facility] directly at [phone].  Normal or non-critical lab and imaging results will be communicated to you by [employer], letter or phone within 4 business days after the clinic has received the results. If you do not hear from us within 7 days, please contact the clinic through [employer] or phone. If you have a critical or abnormal lab result, we will notify you by phone as soon as possible.  Submit refill requests through [employer] or call your pharmacy and they will forward the refill request to us. Please allow 3 business days for your refill to be completed.          Additional Information About Your Visit        MyChart Information     [employer] gives you secure access to your electronic health record. If you see a primary care provider, you can also send messages to your care team and make appointments. If you have questions, please call your primary care clinic.  If you do not have a primary care provider, please call [phone] and they will assist you.        Care EveryWhere ID     This is your Care EveryWhere ID. This could be used by other organizations to access your [place] medical records  [id number]        Your Vitals Were     Pulse Temperature Pulse Oximetry BMI (Body Mass Index)          93 97.9  F (36.6  C) (Oral) 98% 18.02 kg/m2         Blood Pressure from Last 3 Encounters:   09/18/17 139/85   09/01/17  141/77   02/28/17 140/77    Weight from Last 3 Encounters:   09/18/17 105 lb (47.6 kg)   09/01/17 106 lb (48.1 kg)   02/28/17 112 lb (50.8 kg)              We Performed the Following     DEPRESSION ACTION PLAN (DAP)          Today's Medication Changes          These changes are accurate as of: 9/18/17  2:25 PM.  If you have any questions, ask your nurse or doctor.               Start taking these medicines.        Dose/Directions    predniSONE 20 MG tablet   Commonly known as:  DELTASONE   Used for:  Traumatic arthritis of left wrist, Arthritis of left wrist   Started by:  Engelmann, Lauren Anneliese, MD        Dose:  20 mg   Take 1 tablet (20 mg) by mouth daily   Quantity:  5 tablet   Refills:  0         These medicines have changed or have updated prescriptions.        Dose/Directions    * ARIPiprazole 2 MG tablet   Commonly known as:  ABILIFY   This may have changed:  Another medication with the same name was added. Make sure you understand how and when to take each.   Used for:  Major depressive disorder, recurrent episode, moderate (H)   Changed by:  Engelmann, Lauren Anneliese, MD        Dose:  2 mg   Take 1 tablet (2 mg) by mouth At Bedtime   Quantity:  30 tablet   Refills:  0       * ARIPiprazole 5 MG tablet   Commonly known as:  ABILIFY   This may have changed:  You were already taking a medication with the same name, and this prescription was added. Make sure you understand how and when to take each.   Used for:  Major depressive disorder, recurrent episode, moderate (H)   Changed by:  Engelmann, Lauren Anneliese, MD        Dose:  5 mg   Take 1 tablet (5 mg) by mouth At Bedtime   Quantity:  30 tablet   Refills:  1       traMADol 50 MG tablet   Commonly known as:  ULTRAM   This may have changed:  when to take this   Used for:  Arthritis of left wrist   Changed by:  Engelmann, Lauren Anneliese, MD        Dose:  50 mg   Take 1 tablet (50 mg) by mouth every 6 hours as needed for pain maximum 3 tablet(s) per  day   Quantity:  45 tablet   Refills:  0       * Notice:  This list has 2 medication(s) that are the same as other medications prescribed for you. Read the directions carefully, and ask your doctor or other care provider to review them with you.         Where to get your medicines      These medications were sent to [facility]/pharmacy #[id number] - [address] AT CORNER OF [address], [facility] [address]     Phone:  [phone]     ARIPiprazole 5 MG tablet    predniSONE 20 MG tablet         Some of these will need a paper prescription and others can be bought over the counter.  Ask your nurse if you have questions.     Bring a paper prescription for each of these medications     traMADol 50 MG tablet                Primary Care Provider    None       No address on file        Equal Access to Services     TATE MARTINEZ : Kathleen Sullivan, vero dunaway, jose fang, nasim posadas. So [facility] [phone].    ATENCIÓN: Si habla español, tiene a ordoñez disposición servicios gratuitos de asistencia lingüística. Llame al [phone].    We comply with applicable federal civil rights laws and [place] laws. We do not discriminate on the basis of race, color, national origin, age, disability sex, sexual orientation or gender identity.            Thank you!     Thank you for choosing [facility]  for your care. Our goal is always to provide you with excellent care. Hearing back from our patients is one way we can continue to improve our services. Please take a few minutes to complete the written survey that you may receive in the mail after your visit with us. Thank you!             Your Updated Medication List - Protect others around you: Learn how to safely use, store and throw away your medicines at www.disposemymeds.org.          This list is accurate as of: 9/18/17  2:25 PM.  Always use your most recent med list.                    Brand Name Dispense Instructions for use Diagnosis    * ARIPiprazole 2 MG tablet    ABILIFY    30 tablet    Take 1 tablet (2 mg) by mouth At Bedtime    Major depressive disorder, recurrent episode, moderate (H)       * ARIPiprazole 5 MG tablet    ABILIFY    30 tablet    Take 1 tablet (5 mg) by mouth At Bedtime    Major depressive disorder, recurrent episode, moderate (H)       famotidine 40 MG tablet    PEPCID    30 tablet    Take 1 tablet (40 mg) by mouth At Bedtime    Gastric ulcer without hemorrhage or perforation, unspecified chronicity       FLUoxetine 40 MG capsule    PROZAC    60 capsule    Take 2 capsules (80 mg) by mouth daily    Major depressive disorder, recurrent episode, moderate (H)       predniSONE 20 MG tablet    DELTASONE    5 tablet    Take 1 tablet (20 mg) by mouth daily    Traumatic arthritis of left wrist, Arthritis of left wrist       pseudoePHEDrine 120 MG 12 hr tablet    SUDAFED    180 tablet    Take 1 tablet (120 mg) by mouth every 12 hours    Chronic rhinitis       traMADol 50 MG tablet    ULTRAM    45 tablet    Take 1 tablet (50 mg) by mouth every 6 hours as needed for pain maximum 3 tablet(s) per day    Arthritis of left wrist       traZODone 100 MG tablet    DESYREL    90 tablet    TAKE 1 TABLET BY MOUTH AT BEDTIME AS NEEDED FOR SLEEP    Persistent insomnia       * Notice:  This list has 2 medication(s) that are the same as other medications prescribed for you. Read the directions carefully, and ask your doctor or other care provider to review them with you.

## 2017-09-18 NOTE — PATIENT INSTRUCTIONS
Start taking prednisone for your wrist - you will take it for 5 days.   Increase the dose of Abilify to 4mg (2 tablets) per day, then  the new prescription for 5mg tablets.   I will refill your tramadol.   Come back and see me in 4-6 weeks.       Living with Osteoarthritis    Osteoarthritis is a chronic disease and the most common type of arthritis. But it doesn t have to keep you from leading an active life. You can help control symptoms by exercising and losing weight if you are overweight. Using special tools also helps make life easier. Be sure to see your healthcare provider for scheduled checkups and lab work. If you have questions or concerns between office visits, call your healthcare provider's office.  Make exercise part of your life  Gentle exercise can help lessen your pain. Keep the following in mind:    Choose exercises that improve joint motion and make your muscles stronger. Your healthcare provider or a physical therapist may suggest a few.    Stretching and flexibility activities such as yoga and son chi may improve pain and joint motion.    Try low-impact sports, such as walking, biking, or doing exercises in a warm pool.    Most people should exercise for at least 30 minutes a day on most days of the week. This can be broken up into shorter periods throughout the day.    Don t push yourself too hard at first. Slowly build up over time.    Make sure you warm up for 5 to 10 minutes before you exercise.    If pain and stiffness increase, don't exercise as hard or as long.  Watch your weight  If you weigh more than you should, your weight-bearing joints are under extra pressure. This makes your symptoms worse. To reduce pain and stiffness, try shedding a few of those extra pounds. The tips below may help:    Start a weight-loss program with the help of your healthcare provider.    Ask your friends and family for support.    Join a weight-loss group.  Use special tools  Even simple tasks can be  hard to do when your joints hurt. Special tools called assistive devices can make things easier by reducing strain and protecting your joints. Ask your healthcare provider where to find these and other helpful tools:    Long-handled reachers or grabbers    Jar openers and button threaders    Large  for pencils, garden tools, and other hand-held objects  Use mobility and other aids  People with arthritis and other joint problems often use mobility aids to help with walking. For example, they may use canes or walkers. They may also use splints or braces to support joints. Talk with your healthcare provider or physical therapist about these aids:    A cane to reduce knee or hip pain and help prevent falls    Splints for your wrists or other joints    A brace to support a weak knee joint    Orthotics for toe and foot involvement  Medical and surgical treatments  Discuss medical treatments with your healthcare provider to help reduce your pain and improve joint mobility.    Topical medicines such as lidocaine, capsaicin, and diclofenac gel    Oral medicines such as acetaminophen, NSAIDs (nonsteroidal anti-inflammatory medicines) such as ibuprofen and naproxen, or opioid narcotics    Injections in affected joints such as corticosteroids in various joints, or hyaluronic acid in the knee joints    Surgical repair or surgical joint replacement with artificial joints    Complementary therapies such as heat and cold treatment, massage, acupuncture, supplements, cognitive training, meditation, and others. Discuss these options with your healthcare provider.  Date Last Reviewed: 2/14/2016 2000-2017 [employer]. [address] [place] [address], [place] [address]. All rights reserved. This information is not intended as a substitute for professional medical care. Always follow your healthcare professional's instructions.

## 2017-09-28 ENCOUNTER — TELEPHONE (OUTPATIENT)
Dept: FAMILY MEDICINE | Facility: CLINIC | Age: 57
End: 2017-09-28

## 2017-09-28 NOTE — TELEPHONE ENCOUNTER
Panel Management Review      Patient has the following on her problem list:     Depression / Dysthymia review    Measure:  Needs PHQ-9 score of 4 or less during index window.  Administer PHQ-9 and if score is 5 or more, send encounter to provider for next steps.    5 - 7 month window range: Patient to follow up in 4-6 weeks per OV on 9/18/17    PHQ-9 SCORE 9/1/2017 9/1/2017 9/18/2017   Total Score - - -   Total Score 23 24 16       If PHQ-9 recheck is 5 or more, route to provider for next steps.    Patient is due for:  None        Composite cancer screening  Chart review shows that this patient is due/due soon for the following Pap Smear, Mammogram and Fecal Colorectal (FIT)  Summary:    Patient is due/failing the following:   FIT, MAMMOGRAM, PAP and PHYSICAL    Action needed:   Patient needs office visit for physical, pap, mammogram, FIT.    Type of outreach:    Sent [employer] message. 9/28/17    Questions for provider review:    None                                                                                                                                    Barbara Juarez [facility]        Chart routed to Care Team .

## 2017-09-28 NOTE — LETTER
October 5, 2017    Nataliia Flanagan  [address] [address]      Dear Nataliia Flanagan,     We have tried to contact you about your health, but have been unable to reach you.  Please call us as soon as possible so we can provide you with the best care possible.  We will continue to check in with you throughout the year to complete these items of care, if you are not able to complete these items at this time.  If you would like to complete the missing items for your care, please contact us at [phone].    We recommend the following:  -schedule a MAMMOGRAM 1 in 8 women will develop invasive breast cancer during her lifetime and it is the most common non-skin cancer in American women.  EARLY detection, new treatments, and a better understanding of the disease have increased survival rates - the 5 year survival rate in the 1960s was 63% and today it is close to 90% .  Please disregard this reminder if you have had this exam elsewhere within the last year.  It would be helpful for us to have a copy of your mammogram report in our file so that we can best coordinate your care - please contact us with when your test was done so we can update your record. Please call [phone] to schedule your mammogram today.   -complete a FIT TEST a FIT test or Fecal Immunochemical Occult Blood Test is a take home stool sample kit.  It does not replace the colonoscopy for colorectal cancer screening, but it can detect hidden bleeding in the lower colon.  It does need to be repeated every year and if a positive result is obtained, you would be referred for a colonoscopy.  If you have completed either one of these tests at another facility, please have the records sent to our clinic so that we can best coordinate your care.  -schedule a PAP SMEAR EXAM which is due.  Please disregard this reminder if you have had this exam elsewhere within the last year.  It would be helpful for us to have a copy of  your recent pap smear report in our file so that we can best coordinate your care.  -schedule a PHYSICAL with your provider.    Sincerely,     Your Care Team at [place]

## 2017-10-02 ENCOUNTER — TRANSFERRED RECORDS (OUTPATIENT)
Dept: HEALTH INFORMATION MANAGEMENT | Facility: CLINIC | Age: 57
End: 2017-10-02

## 2017-10-05 NOTE — TELEPHONE ENCOUNTER
[employer] message reviewed on 9/28/17 and no appointment scheduled. Final PM letter mailed. Closing encounter.  Barbara Juarez CMA

## 2017-10-08 ENCOUNTER — HEALTH MAINTENANCE LETTER (OUTPATIENT)
Age: 57
End: 2017-10-08

## 2017-10-09 DIAGNOSIS — M12.532 TRAUMATIC ARTHRITIS OF LEFT WRIST: ICD-10-CM

## 2017-10-10 RX ORDER — TRAMADOL HYDROCHLORIDE 50 MG/1
TABLET ORAL
Qty: 45 TABLET | Refills: 0 | Status: SHIPPED | OUTPATIENT
Start: 2017-10-10 | End: 2017-11-01

## 2017-10-10 NOTE — TELEPHONE ENCOUNTER
Routing refill request to provider for review/approval because:  Drug not on the [facility] refill protocol Tramadol  Last filled 9/18/17 for 45 tabs  Last OV 9/18/17    Albania oHllis RN  [facility]

## 2017-11-01 DIAGNOSIS — M12.532 TRAUMATIC ARTHRITIS OF LEFT WRIST: ICD-10-CM

## 2017-11-02 RX ORDER — TRAMADOL HYDROCHLORIDE 50 MG/1
TABLET ORAL
Qty: 45 TABLET | Refills: 0 | Status: SHIPPED | OUTPATIENT
Start: 2017-11-02 | End: 2017-11-16

## 2017-11-02 NOTE — TELEPHONE ENCOUNTER
Routing refill request to provider for review/approval because:  Drug not on the [facility] refill protocol   Sri Moran RN [facility] Triage.

## 2017-11-15 DIAGNOSIS — F33.1 MAJOR DEPRESSIVE DISORDER, RECURRENT EPISODE, MODERATE (H): ICD-10-CM

## 2017-11-16 DIAGNOSIS — M12.532 TRAUMATIC ARTHRITIS OF LEFT WRIST: ICD-10-CM

## 2017-11-16 RX ORDER — TRAMADOL HYDROCHLORIDE 50 MG/1
TABLET ORAL
Qty: 120 TABLET | Refills: 0 | Status: SHIPPED | OUTPATIENT
Start: 2017-11-16 | End: 2017-12-15

## 2017-11-16 RX ORDER — ARIPIPRAZOLE 5 MG/1
TABLET ORAL
Qty: 30 TABLET | Refills: 1 | Status: SHIPPED | OUTPATIENT
Start: 2017-11-16 | End: 2018-01-10

## 2017-11-16 NOTE — TELEPHONE ENCOUNTER
Requested Prescriptions   Pending Prescriptions Disp Refills     ARIPiprazole (ABILIFY) 5 MG tablet [Pharmacy Med Name: ARIPIPRAZOLE 5 MG TABLET] 30 tablet 1     Sig: TAKE 1 TABLET (5 MG) BY MOUTH AT BEDTIME    Antipsychotic Medications Failed    11/15/2017 12:52 AM       Failed - Lipid panel on file within the past 12 months    Recent Labs   Lab Test  07/03/12   1135   CHOL  179   TRIG  104   HDL  52   LDL  106   VLDL  21   CHOLHDLRATIO  3.4              Failed - CBC on file in past 12 months    Recent Labs   Lab Test  02/28/17   1122   WBC  3.8*   RBC  4.00   HGB  11.9   HCT  35.5   PLT  276            Failed - A1c or Glucose on file in past 12 months    Recent Labs   Lab Test  06/19/14   1133   GLC  92       Please review patients last 3 weights. If a weight gain of >10 lbs exists, you may refill the prescription once after instructing the patient to schedule an appointment within the next 30 days.    Wt Readings from Last 3 Encounters:   09/18/17 105 lb (47.6 kg)   09/01/17 106 lb (48.1 kg)   02/28/17 112 lb (50.8 kg)            Passed - BP is less then 140/90    BP Readings from Last 3 Encounters:   09/18/17 139/85   09/01/17 141/77   02/28/17 140/77                Passed - Patient is 12 years of age or older       Passed - Heart Rate on file within past 12 months    Data Unavailable           Passed - Patient is not pregnant       Passed - No positve pregnancy test on file in past 12 months       Passed - Recent or future visit with authorizing provider's specialty    Patient had office visit in the last 6 months or has a visit in the next 30 days with authorizing provider.  See chart review.           Routing refill request to provider for review/approval because:  Labs not current:  Please advise. Kellie Covington RN-BSN

## 2017-11-16 NOTE — TELEPHONE ENCOUNTER
Requested Prescriptions   Pending Prescriptions Disp Refills     traMADol (ULTRAM) 50 MG tablet [Pharmacy Med Name: TRAMADOL HCL 50 MG TABLET] 45 tablet 0     Sig: TAKE 1 TAB BY MOUTH EVERY 6 HOURS AS NEEDED.M,AX 4 A DAY    There is no refill protocol information for this order          Last refill 11/2/17 # 45  Last OV 9/18/17      Routing refill request to provider for review/approval because:  Drug not on the [facility] refill protocol   Sri Moran RN [facility] Triage.

## 2017-12-15 DIAGNOSIS — M12.532 TRAUMATIC ARTHRITIS OF LEFT WRIST: ICD-10-CM

## 2017-12-15 NOTE — TELEPHONE ENCOUNTER
Requested Prescriptions   Pending Prescriptions Disp Refills     traMADol (ULTRAM) 50 MG tablet [Pharmacy Med Name: TRAMADOL HCL 50 MG TABLET] 120 tablet 0     Sig: TAKE 1 TABLET BY MOUTH EVERY 6 HOURS AS NEEDED FOR MAX 4/DAY    There is no refill protocol information for this order        Last filled 11/16/2017  Last OV 9/18/2017    Routing refill request to provider for review/approval because:  Drug not on the [facility] refill protocol       Albania Hollis RN  [facility]

## 2017-12-18 DIAGNOSIS — K25.9 GASTRIC ULCER WITHOUT HEMORRHAGE OR PERFORATION, UNSPECIFIED CHRONICITY: ICD-10-CM

## 2017-12-18 RX ORDER — TRAMADOL HYDROCHLORIDE 50 MG/1
TABLET ORAL
Qty: 120 TABLET | Refills: 0 | Status: SHIPPED | OUTPATIENT
Start: 2017-12-18 | End: 2018-01-18

## 2017-12-19 RX ORDER — FAMOTIDINE 40 MG/1
TABLET, FILM COATED ORAL
Qty: 30 TABLET | Refills: 3 | Status: SHIPPED | OUTPATIENT
Start: 2017-12-19 | End: 2018-05-03

## 2018-01-05 DIAGNOSIS — G47.00 PERSISTENT INSOMNIA: ICD-10-CM

## 2018-01-05 RX ORDER — TRAZODONE HYDROCHLORIDE 100 MG/1
TABLET ORAL
Qty: 90 TABLET | Refills: 1 | Status: SHIPPED | OUTPATIENT
Start: 2018-01-05 | End: 2018-07-02

## 2018-01-05 NOTE — TELEPHONE ENCOUNTER
Requested Prescriptions   Pending Prescriptions Disp Refills     traZODone (DESYREL) 100 MG tablet [Pharmacy Med Name: TRAZODONE 100 MG TABLET] 90 tablet 1    Last Written Prescription Date:  7/10/17  Last Fill Quantity: 90,  # refills: 1   Last Office Visit with [facility], [facility] or [facility] prescribing provider:  9/18/17   Future Office Visit:      Sig: TAKE 1 TABLET BY MOUTH AT BEDTIME AS NEEDED FOR SLEEP    Serotonin Modulators Passed    1/5/2018  1:06 AM       Passed - Recent or future visit with authorizing provider's specialty    Patient had office visit in the last year or has a visit in the next 30 days with authorizing provider.  See chart review.              Passed - Patient is age 18 or older       Passed - No active pregnancy on record       Passed - No positive pregnancy test in past 12 months

## 2018-01-06 DIAGNOSIS — J31.0 CHRONIC RHINITIS: ICD-10-CM

## 2018-01-06 DIAGNOSIS — J30.89 CHRONIC NON-SEASONAL ALLERGIC RHINITIS, UNSPECIFIED TRIGGER: Primary | ICD-10-CM

## 2018-01-08 NOTE — TELEPHONE ENCOUNTER
Routing refill request to provider for review/approval because:  Drug not on the [facility] refill protocol     Eleanor Mckee RN  [facility]

## 2018-01-08 NOTE — TELEPHONE ENCOUNTER
Requested Prescriptions   Pending Prescriptions Disp Refills     EQL 12 HOUR DECONGESTANT 120 MG 12 hr tablet [Pharmacy Med Name: EQL 12 Hour Decongestant Oral Tablet Extended Release 12 Hour 120 MG] 180 tablet 4     Sig: TAKE 1 TABLET BY MOUTH EVERY 12 HOURS    There is no refill protocol information for this order         Last Written Prescription Date:  10/18/16  Last Fill Quantity: 180,   # refills: 5  Last Office Visit: 9/18/17  Future Office visit:       Routing refill request to provider for review/approval because:  Drug not on the [facility], [facility] or [facility] refill protocol or controlled substance

## 2018-01-10 DIAGNOSIS — F33.1 MAJOR DEPRESSIVE DISORDER, RECURRENT EPISODE, MODERATE (H): ICD-10-CM

## 2018-01-10 RX ORDER — ARIPIPRAZOLE 5 MG/1
TABLET ORAL
Qty: 30 TABLET | Refills: 1 | Status: SHIPPED | OUTPATIENT
Start: 2018-01-10 | End: 2018-03-16

## 2018-01-10 NOTE — TELEPHONE ENCOUNTER
Routing refill request to provider for review/approval because:  Labs not current:  FLP (2012), Glucose (2014)      Dayan Nolan RN  [facility]

## 2018-01-10 NOTE — TELEPHONE ENCOUNTER
"Requested Prescriptions   Pending Prescriptions Disp Refills     ARIPiprazole (ABILIFY) 5 MG tablet [Pharmacy Med Name: ARIPIPRAZOLE 5 MG TABLET] 30 tablet 1    Last Written Prescription Date:  9-1-17  Last Fill Quantity: 30,  # refills: 0   Last Office Visit with TRAY, MARIA E or [facility] prescribing provider:  9-18-17   Future Office Visit:      Sig: TAKE 1 TABLET (5 MG) BY MOUTH AT BEDTIME    Antipsychotic Medications Failed    1/10/2018  1:18 AM       Failed - Lipid panel on file within the past 12 months    Recent Labs   Lab Test  07/03/12   1135   CHOL  179   TRIG  104   HDL  52   LDL  106   VLDL  21   CHOLHDLRATIO  3.4              Failed - A1c or Glucose on file in past 12 months    Recent Labs   Lab Test  06/19/14   1133   GLC  92       Please review patients last 3 weights. If a weight gain of >10 lbs exists, you may refill the prescription once after instructing the patient to schedule an appointment within the next 30 days.    Wt Readings from Last 3 Encounters:   09/18/17 105 lb (47.6 kg)   09/01/17 106 lb (48.1 kg)   02/28/17 112 lb (50.8 kg)            Passed - BP is less then 140/90    BP Readings from Last 3 Encounters:   09/18/17 139/85   09/01/17 141/77   02/28/17 140/77                Passed - Patient is 12 years of age or older       Passed - CBC on file in past 12 months    Recent Labs   Lab Test  02/28/17   1122   WBC  3.8*   RBC  4.00   HGB  11.9   HCT  35.5   PLT  276            Passed - Heart Rate on file within past 12 months    Pulse Readings from Last 3 Encounters:   09/18/17 93   09/01/17 73   02/28/17 82              Passed - Patient is not pregnant       Passed - No positve pregnancy test on file in past 12 months       Passed - Recent or future visit with authorizing provider's specialty    Patient had office visit in the last 6 months or has a visit in the next 30 days with authorizing provider.  See \"Patient Info\" tab in inbasket, or \"Choose Columns\" in Meds & Orders section of the " refill encounter.

## 2018-01-18 DIAGNOSIS — M12.532 TRAUMATIC ARTHRITIS OF LEFT WRIST: ICD-10-CM

## 2018-01-18 NOTE — TELEPHONE ENCOUNTER
Requested Prescriptions   Pending Prescriptions Disp Refills     traMADol (ULTRAM) 50 MG tablet [Pharmacy Med Name: TRAMADOL HCL 50 MG TABLET] 120 tablet 0     Sig: TAKE 1 TABLET BY MOUTH EVERY 6 HOURS AS NEEDED, MAX 4 TABS/DAY    There is no refill protocol information for this order        Last Written Prescription Date:  12/18/2017  Last Fill Quantity: 120,  # refills: 0   Last Office Visit with [facility], [facility] or [facility] prescribing provider:  9/18/2017   Future Office Visit:       Routing refill request to provider for review/approval because:  Drug not on the [facility] refill protocol       Albania Hollis RN  [facility]

## 2018-01-19 RX ORDER — TRAMADOL HYDROCHLORIDE 50 MG/1
TABLET ORAL
Qty: 120 TABLET | Refills: 0 | Status: SHIPPED | OUTPATIENT
Start: 2018-01-19 | End: 2018-03-27

## 2018-01-23 DIAGNOSIS — J30.89 CHRONIC NON-SEASONAL ALLERGIC RHINITIS, UNSPECIFIED TRIGGER: ICD-10-CM

## 2018-01-23 NOTE — TELEPHONE ENCOUNTER
Requested Prescriptions   Pending Prescriptions Disp Refills     EQL 12 HOUR DECONGESTANT 120 MG 12 hr tablet [Pharmacy Med Name: EQL 12 Hour Decongestant Oral Tablet Extended Release 12 Hour 120 MG] 180 tablet 4     Sig: TAKE 1 TABLET BY MOUTH EVERY 12 HOURS    There is no refill protocol information for this order         Last Written Prescription Date:  1/8/18  Last Fill Quantity: 180,   # refills: 4  Last Office Visit: 9/18/17  Future Office visit:       Routing refill request to provider for review/approval because:  Drug not on the [facility], [facility] or [facility] refill protocol or controlled substance

## 2018-01-24 ENCOUNTER — TELEPHONE (OUTPATIENT)
Dept: FAMILY MEDICINE | Facility: CLINIC | Age: 58
End: 2018-01-24
Payer: COMMERCIAL

## 2018-01-24 ENCOUNTER — TELEPHONE (OUTPATIENT)
Dept: FAMILY MEDICINE | Facility: CLINIC | Age: 58
End: 2018-01-24

## 2018-01-24 DIAGNOSIS — Z53.9 ERRONEOUS ENCOUNTER--DISREGARD: Primary | ICD-10-CM

## 2018-01-24 NOTE — TELEPHONE ENCOUNTER
Panel Management Review      Patient has the following on her problem list:     Depression / Dysthymia review    Measure:  Needs PHQ-9 score of 4 or less during index window.  Administer PHQ-9 and if score is 5 or more, send encounter to provider for next steps.    5 - 7 month window range:     PHQ-9 SCORE 9/1/2017 9/1/2017 9/18/2017   Total Score - - -   Total Score 23 24 16       If PHQ-9 recheck is 5 or more, route to provider for next steps.    Patient is due for:  None      Composite cancer screening  Chart review shows that this patient is due/due soon for the following Pap Smear, Mammogram and Fecal Colorectal (FIT)  Summary:    Patient is due/failing the following:   FIT, MAMMOGRAM, PAP and PHYSICAL    Action needed:   Patient needs office visit for Physical with pap.    Type of outreach:    Sent letter.    Questions for provider review:    None                                                                                                                                    Mamie Goyal       Chart routed to Care Team .

## 2018-01-24 NOTE — LETTER
January 24, 2018    Nataliia Flanagan  [address] [facility] [address]    Dear Nataliia    We care about your health and have reviewed your health plan. We have reviewed your medical conditions, medication list, and lab results and are making recommendations based on this review, to better manage your health.    You are in particular need of attention regarding:  - Scheduling a Breast Cancer Screening (Mammography) [phone]  - Completing a Colon Cancer Screening (FIT) - Please complete FIT test which can be picked up at the clinic and mail completed test to clinic.  - Scheduling a Physical with a Cervical Cancer Screening (Pap Smear) age 64 and younger [phone]      Here is a list of Health Maintenance topics that are due now or due soon:  Health Maintenance Due   Topic Date Due     TETANUS IMMUNIZATION (SYSTEM ASSIGNED)  11/22/1978     HEPATITIS C SCREENING  11/22/1978     PAP SCREENING Q3 YR (SYSTEM ASSIGNED)  11/22/1981     MAMMO SCREEN Q2 YR (SYSTEM ASSIGNED)  11/22/2010     FIT Q1 YR  05/19/2015     ADVANCE DIRECTIVE PLANNING Q5 YRS  11/22/2015     LIPID SCREEN Q5 YR FEMALE (SYSTEM ASSIGNED)  07/03/2017     INFLUENZA VACCINE (SYSTEM ASSIGNED)  09/01/2017     We will be calling you in the next couple of weeks to help you schedule any appointments that are needed.  Please call us at [phone] (or use Clean PET) to address the above recommendations.     Thank you for trusting [facility] and we appreciate the opportunity to serve you.  We look forward to supporting your healthcare needs in the future.    Healthy Regards,    Your care team

## 2018-01-24 NOTE — TELEPHONE ENCOUNTER
Routing refill request to provider for review/approval because:  Drug not on the [facility] refill protocol   RN unable to sign under Hovda's name      Dayan Nolan RN  [facility]

## 2018-03-15 ENCOUNTER — OFFICE VISIT (OUTPATIENT)
Dept: FAMILY MEDICINE | Facility: CLINIC | Age: 58
End: 2018-03-15
Payer: COMMERCIAL

## 2018-03-15 ENCOUNTER — RADIANT APPOINTMENT (OUTPATIENT)
Dept: GENERAL RADIOLOGY | Facility: CLINIC | Age: 58
End: 2018-03-15
Attending: FAMILY MEDICINE
Payer: COMMERCIAL

## 2018-03-15 VITALS
WEIGHT: 103 LBS | HEIGHT: 63 IN | OXYGEN SATURATION: 98 % | DIASTOLIC BLOOD PRESSURE: 71 MMHG | HEART RATE: 83 BPM | BODY MASS INDEX: 18.25 KG/M2 | SYSTOLIC BLOOD PRESSURE: 137 MMHG | TEMPERATURE: 97.6 F

## 2018-03-15 DIAGNOSIS — R07.81 RIB PAIN ON RIGHT SIDE: ICD-10-CM

## 2018-03-15 DIAGNOSIS — S22.31XA CLOSED FRACTURE OF ONE RIB OF RIGHT SIDE, INITIAL ENCOUNTER: Primary | ICD-10-CM

## 2018-03-15 PROCEDURE — 71101 X-RAY EXAM UNILAT RIBS/CHEST: CPT | Mod: RT

## 2018-03-15 PROCEDURE — 99213 OFFICE O/P EST LOW 20 MIN: CPT | Performed by: FAMILY MEDICINE

## 2018-03-15 RX ORDER — HYDROCODONE BITARTRATE AND ACETAMINOPHEN 5; 325 MG/1; MG/1
1 TABLET ORAL EVERY 6 HOURS PRN
Qty: 20 TABLET | Refills: 0 | Status: SHIPPED | OUTPATIENT
Start: 2018-03-15 | End: 2018-03-22

## 2018-03-15 ASSESSMENT — PAIN SCALES - GENERAL: PAINLEVEL: EXTREME PAIN (8)

## 2018-03-15 NOTE — LETTER
[facility]  [address] [place] [address]    March 15, 2018    Re: Nataliia Flanagan  : 1960    To Whom it May Concern,     I saw Nataliia in clinic today and diagnosed her with a broken rib. I request that she stay home from work on  and Monday,  and , so that she may heal appropriately.     Sincerely,           Lauren Engelmann, MD

## 2018-03-15 NOTE — MR AVS SNAPSHOT
After Visit Summary   3/15/2018    Nataliia Flanagan    MRN: [id number]           Patient Information     Date Of Birth          1960        Visit Information        Provider Department      3/15/2018 2:20 PM Engelmann, Lauren Anneliese, MD [facility]        Today's Diagnoses     Closed fracture of one rib of right side, initial encounter    -  1    Rib pain on right side          Care Instructions      Rib Fracture (Broken Rib)  Your ribs are curved bones in your chest. They help protect your lungs and expand and contract when you breathe. Children's ribs bend easily and can often withstand a blow or fall. But adult ribs are more likely to break (fracture) under stress. Even coughing or a hard sneeze can fracture a rib.    When to go to the Emergency Room (ER)  Although they can be painful, most rib fractures aren't serious. But they often make it hard to cough or breathe deeply. Get medical care right away if you have:    Trouble breathing.    Nausea, vomiting, or stomach pain with a sore or bruised rib.    Pain that worsens over time.    An injury to the chest or stomach.  What to expect in the ER  Here is what will happen in the ER:     A healthcare provider will ask about your injury and examine you carefully.    An X-ray of your chest will likely be taken to show any major damage to ribs and lungs. However, ribs can undergo small breaks that do not show up on X-rays, even though they still hurt.    You may be given medicine to ease your discomfort.    Rarely, rib fractures can cause a lung to collapse or lead to bleeding in the chest. In these cases, a tube will be inserted into the chest to reinflate the lung or drain the blood.  Follow-up  You are likely to heal in 6 to 8 weeks. Most rib fractures heal on their own with no lasting effects. Call your healthcare provider right away if you notice any of these symptoms:    Increased chest pain    Shortness of  "breath    Fever    Coughing up blood  Date Last Reviewed: 9/26/2015 2000-2017 [employer]. [address] [place] [address], [place] [address]. All rights reserved. This information is not intended as a substitute for professional medical care. Always follow your healthcare professional's instructions.                Follow-ups after your visit        Who to contact     If you have questions or need follow up information about today's clinic visit or your schedule please contact [facility] directly at [phone].  Normal or non-critical lab and imaging results will be communicated to you by Dropmysitehart, letter or phone within 4 business days after the clinic has received the results. If you do not hear from us within 7 days, please contact the clinic through [employer] or phone. If you have a critical or abnormal lab result, we will notify you by phone as soon as possible.  Submit refill requests through [employer] or call your pharmacy and they will forward the refill request to us. Please allow 3 business days for your refill to be completed.          Additional Information About Your Visit        Dropmysitehart Information     [employer] gives you secure access to your electronic health record. If you see a primary care provider, you can also send messages to your care team and make appointments. If you have questions, please call your primary care clinic.  If you do not have a primary care provider, please call [phone] and they will assist you.        Care EveryWhere ID     This is your Care EveryWhere ID. This could be used by other organizations to access your [place] medical records  [id number]        Your Vitals Were     Pulse Temperature Height Pulse Oximetry BMI (Body Mass Index)       83 97.6  F (36.4  C) (Oral) 5' 3.25\" (1.607 m) 98% 18.1 kg/m2        Blood Pressure from Last 3 Encounters:   03/15/18 137/71   09/18/17 139/85   09/01/17 141/77    Weight from Last 3 Encounters: "   03/15/18 103 lb (46.7 kg)   09/18/17 105 lb (47.6 kg)   09/01/17 106 lb (48.1 kg)                 Today's Medication Changes          These changes are accurate as of 3/15/18  3:04 PM.  If you have any questions, ask your nurse or doctor.               Start taking these medicines.        Dose/Directions    HYDROcodone-acetaminophen 5-325 MG per tablet   Commonly known as:  NORCO   Used for:  Closed fracture of one rib of right side, initial encounter   Started by:  Engelmann, Lauren Anneliese, MD        Dose:  1 tablet   Take 1 tablet by mouth every 6 hours as needed for moderate to severe pain maximum 4 tablet(s) per day   Quantity:  20 tablet   Refills:  0         These medicines have changed or have updated prescriptions.        Dose/Directions    ARIPiprazole 5 MG tablet   Commonly known as:  ABILIFY   This may have changed:  Another medication with the same name was removed. Continue taking this medication, and follow the directions you see here.   Used for:  Major depressive disorder, recurrent episode, moderate (H)   Changed by:  Engelmann, Lauren Anneliese, MD        TAKE 1 TABLET (5 MG) BY MOUTH AT BEDTIME   Quantity:  30 tablet   Refills:  1            Where to get your medicines      Some of these will need a paper prescription and others can be bought over the counter.  Ask your nurse if you have questions.     Bring a paper prescription for each of these medications     HYDROcodone-acetaminophen 5-325 MG per tablet                Primary Care Provider Office Phone # Fax #    Lauren Anneliese Engelmann, [phone]       [address] [address]        Equal Access to Services     [facility] AH: Kathleen bassett Soanahi, waaxda luqadaha, qaybta kaalmada adeegyada, nasim posadas. So [facility] [phone].    ATENCIÓN: Si habla español, tiene a ordoñez disposición servicios gratuitos de asistencia  lingüística. Norm al [phone].    We comply with applicable federal civil rights laws and [place] laws. We do not discriminate on the basis of race, color, national origin, age, disability, sex, sexual orientation, or gender identity.            Thank you!     Thank you for choosing [facility]  for your care. Our goal is always to provide you with excellent care. Hearing back from our patients is one way we can continue to improve our services. Please take a few minutes to complete the written survey that you may receive in the mail after your visit with us. Thank you!             Your Updated Medication List - Protect others around you: Learn how to safely use, store and throw away your medicines at www.disposemymeds.org.          This list is accurate as of 3/15/18  3:04 PM.  Always use your most recent med list.                   Brand Name Dispense Instructions for use Diagnosis    ARIPiprazole 5 MG tablet    ABILIFY    30 tablet    TAKE 1 TABLET (5 MG) BY MOUTH AT BEDTIME    Major depressive disorder, recurrent episode, moderate (H)       EQL 12 HOUR DECONGESTANT 120 MG 12 hr tablet   Generic drug:  pseudoePHEDrine     180 tablet    TAKE 1 TABLET BY MOUTH EVERY 12 HOURS    Chronic non-seasonal allergic rhinitis, unspecified trigger       EXCEDRIN PO      Take 2 tablets by mouth as needed        famotidine 40 MG tablet    PEPCID    30 tablet    TAKE 1 TABLET (40 MG) BY MOUTH AT BEDTIME    Gastric ulcer without hemorrhage or perforation, unspecified chronicity       FLUoxetine 40 MG capsule    PROzac    180 capsule    Take 2 capsules (80 mg) by mouth daily    Major depressive disorder, recurrent episode, moderate (H)       HYDROcodone-acetaminophen 5-325 MG per tablet    NORCO    20 tablet    Take 1 tablet by mouth every 6 hours as needed for moderate to severe pain maximum 4 tablet(s) per day    Closed fracture of one rib of right side, initial encounter       traMADol 50 MG tablet     ULTRAM    120 tablet    TAKE 1 TABLET BY MOUTH EVERY 6 HOURS AS NEEDED, MAX 4 TABS/DAY    Traumatic arthritis of left wrist       traZODone 100 MG tablet    DESYREL    90 tablet    TAKE 1 TABLET BY MOUTH AT BEDTIME AS NEEDED FOR SLEEP    Persistent insomnia

## 2018-03-15 NOTE — PROGRESS NOTES
"  SUBJECTIVE:   Nataliia Flanagan is a 57 year old female who presents to clinic today for the following health issues:    Musculoskeletal problem/pain      Duration: 2 DAYS    Description  Location: Right anterior rib    Intensity:  moderate, severe    Accompanying signs and symptoms: none    History  Previous similar problem: no   Previous evaluation:  none    Precipitating or alleviating factors:  Trauma or overuse: YES- tripped and fell, landed on side  Aggravating factors include: exercise, overuse and breathing deeply    Therapies tried and outcome: rest/inactivity and immobilization      Problem list and histories reviewed & adjusted, as indicated.  Additional history: as documented    Patient Active Problem List   Diagnosis     Chronic rhinitis     CARDIOVASCULAR SCREENING; LDL GOAL LESS THAN 160     Malnutrition (H)     Iron deficiency anemia     Distal radius fracture     Joint inflammation of wrist - left     Gastroesophageal reflux disease without esophagitis     Major depressive disorder, recurrent episode, moderate (H)     Traumatic arthritis of left wrist     Past Surgical History:   Procedure Laterality Date     ENDOSCOPY  12-9-14     ENT SURGERY      tonsilectomy     GENITOURINARY SURGERY      tubes tied       Social History   Substance Use Topics     Smoking status: Never Smoker     Smokeless tobacco: Never Used     Alcohol use No     History reviewed. No pertinent family history.        Reviewed and updated as needed this visit by clinical staff  Tobacco  Allergies  Meds  Med Hx  Surg Hx  Fam Hx  Soc Hx      Reviewed and updated as needed this visit by Provider         ROS:  Constitutional, HEENT, cardiovascular, pulmonary, gi and gu systems are negative, except as otherwise noted.    OBJECTIVE:     /71 (BP Location: Right arm, Patient Position: Sitting, Cuff Size: Adult Regular)  Pulse 83  Temp 97.6  F (36.4  C) (Oral)  Ht 5' 3.25\" (1.607 m)  Wt 103 lb (46.7 kg)  SpO2 98%  BMI " 18.1 kg/m2  Body mass index is 18.1 kg/(m^2).  GENERAL: healthy, alert and no distress  RESP: no rales , no rhonchi, no wheezes and unable to take a deep breath without pain  CV: regular rate and rhythm, normal S1 S2, no S3 or S4, no murmur, click or rub, no peripheral edema and peripheral pulses strong  ABDOMEN: soft, nontender, no hepatosplenomegaly, no masses and bowel sounds normal  MS: tenderness to palpation of right anterior rib cage at approximately the 9th and 10th ribs    Diagnostic Test Results:  Recent Results (from the past 744 hour(s))   XR Ribs & Chest Right G/E 3 Views    Narrative    XR RIBS & CHEST RT 3VW 3/15/2018 2:56 PM     HISTORY: fall one week ago, right-sided rib pain; Rib pain on right  side    COMPARISON: None      Impression    IMPRESSION: The cardiac silhouette and pulmonary vasculature are  within normal limits. No evidence of pneumothorax or pleural effusion.  The lungs are clear. Rib detail views demonstrate a nondisplaced  fracture of the right ninth rib.    POONAM CHAVEZ MD         ASSESSMENT/PLAN:       ICD-10-CM    1. Closed fracture of one rib of right side, initial encounter S22.31XA DISCONTINUED: HYDROcodone-acetaminophen (NORCO) 5-325 MG per tablet   2. Rib pain on right side R07.81 XR Ribs & Chest Right G/E 3 Views     Reviewed ED precautions including SOB, worsening bruising, worsening pain, hemoptysis     FUTURE APPOINTMENTS:       - Follow-up for annual visit or as needed  See Patient Instructions    Lauren A. Engelmann, MD  [facility]

## 2018-03-15 NOTE — PATIENT INSTRUCTIONS
Rib Fracture (Broken Rib)  Your ribs are curved bones in your chest. They help protect your lungs and expand and contract when you breathe. Children's ribs bend easily and can often withstand a blow or fall. But adult ribs are more likely to break (fracture) under stress. Even coughing or a hard sneeze can fracture a rib.    When to go to the Emergency Room (ER)  Although they can be painful, most rib fractures aren't serious. But they often make it hard to cough or breathe deeply. Get medical care right away if you have:    Trouble breathing.    Nausea, vomiting, or stomach pain with a sore or bruised rib.    Pain that worsens over time.    An injury to the chest or stomach.  What to expect in the ER  Here is what will happen in the ER:     A healthcare provider will ask about your injury and examine you carefully.    An X-ray of your chest will likely be taken to show any major damage to ribs and lungs. However, ribs can undergo small breaks that do not show up on X-rays, even though they still hurt.    You may be given medicine to ease your discomfort.    Rarely, rib fractures can cause a lung to collapse or lead to bleeding in the chest. In these cases, a tube will be inserted into the chest to reinflate the lung or drain the blood.  Follow-up  You are likely to heal in 6 to 8 weeks. Most rib fractures heal on their own with no lasting effects. Call your healthcare provider right away if you notice any of these symptoms:    Increased chest pain    Shortness of breath    Fever    Coughing up blood  Date Last Reviewed: 9/26/2015 2000-2017 [employer]. [address] [place] [address], [place] [address]. All rights reserved. This information is not intended as a substitute for professional medical care. Always follow your healthcare professional's instructions.

## 2018-03-16 DIAGNOSIS — F33.1 MAJOR DEPRESSIVE DISORDER, RECURRENT EPISODE, MODERATE (H): ICD-10-CM

## 2018-03-16 RX ORDER — ARIPIPRAZOLE 5 MG/1
TABLET ORAL
Qty: 30 TABLET | Refills: 1 | Status: SHIPPED | OUTPATIENT
Start: 2018-03-16 | End: 2018-05-16

## 2018-03-16 NOTE — TELEPHONE ENCOUNTER
Requested Prescriptions   Pending Prescriptions Disp Refills     ARIPiprazole (ABILIFY) 5 MG tablet [Pharmacy Med Name: ARIPIPRAZOLE 5 MG TABLET] 30 tablet 1    Last Written Prescription Date:  1-10-18  Last Fill Quantity: 30,  # refills: 1   Last office visit: No previous visit found with prescribing provider:     Future Office Visit:     Sig: TAKE 1 TABLET (5 MG) BY MOUTH AT BEDTIME    Antipsychotic Medications Failed    3/16/2018  1:59 AM       Failed - Lipid panel on file within the past 12 months    Recent Labs   Lab Test  07/03/12   1135   CHOL  179   TRIG  104   HDL  52   LDL  106   VLDL  21   CHOLHDLRATIO  3.4              Failed - CBC on file in past 12 months    Recent Labs   Lab Test  02/28/17   1122   WBC  3.8*   RBC  4.00   HGB  11.9   HCT  35.5   PLT  276            Failed - A1c or Glucose on file in past 12 months    Recent Labs   Lab Test  06/19/14   1133   GLC  92       Please review patients last 3 weights. If a weight gain of >10 lbs exists, you may refill the prescription once after instructing the patient to schedule an appointment within the next 30 days.    Wt Readings from Last 3 Encounters:   03/15/18 103 lb (46.7 kg)   09/18/17 105 lb (47.6 kg)   09/01/17 106 lb (48.1 kg)            Passed - Blood pressure under 140/90 in past 12 months    BP Readings from Last 3 Encounters:   03/15/18 137/71   09/18/17 139/85   09/01/17 141/77                Passed - Patient is 12 years of age or older       Passed - Heart Rate on file within past 12 months    Pulse Readings from Last 3 Encounters:   03/15/18 83   09/18/17 93   09/01/17 73              Passed - Patient is not pregnant       Passed - No positve pregnancy test on file in past 12 months       Passed - Recent (6 mo) or future (30 days) visit within the authorizing provider's specialty    Patient had office visit in the last 6 months or has a visit in the next 30 days with authorizing provider or within the authorizing provider's specialty.   "See \"Patient Info\" tab in inbasket, or \"Choose Columns\" in Meds & Orders section of the refill encounter.              "

## 2018-03-16 NOTE — TELEPHONE ENCOUNTER
Routing refill request to provider for review/approval because:  Failed protocol.  Sri Moran RN [facility] Triage.

## 2018-03-22 ENCOUNTER — TELEPHONE (OUTPATIENT)
Dept: FAMILY MEDICINE | Facility: CLINIC | Age: 58
End: 2018-03-22

## 2018-03-22 DIAGNOSIS — S22.31XA CLOSED FRACTURE OF ONE RIB OF RIGHT SIDE, INITIAL ENCOUNTER: ICD-10-CM

## 2018-03-22 RX ORDER — HYDROCODONE BITARTRATE AND ACETAMINOPHEN 5; 325 MG/1; MG/1
1 TABLET ORAL EVERY 6 HOURS PRN
Qty: 20 TABLET | Refills: 0 | Status: SHIPPED | OUTPATIENT
Start: 2018-03-22 | End: 2019-06-03

## 2018-03-22 NOTE — TELEPHONE ENCOUNTER
Requested Prescriptions   Pending Prescriptions Disp Refills     HYDROcodone-acetaminophen (NORCO) 5-325 MG per tablet 20 tablet 0     Sig: Take 1 tablet by mouth every 6 hours as needed for moderate to severe pain maximum 4 tablet(s) per day    There is no refill protocol information for this order        Last refill 3/15/18 # 20  Last OV 3/15/18.    Routing refill request to provider for review/approval because:  Drug not on the [facility] refill protocol.  Sri Moran RN [facility] Triage.

## 2018-03-22 NOTE — TELEPHONE ENCOUNTER
Reason for Call:  Other     Detailed comments: patient called and would like a refill of the medication HYDROcodone-acetaminophen (NORCO) 5-325 MG per tablet please call when prescription can be picked up at the front dest     Phone Number Patient can be reached at: Home number on file [phone] (home)    Best Time: any    Can we leave a detailed message on this number? YES    Call taken on 3/22/2018 at 2:13 PM by Celi Real

## 2018-03-23 NOTE — TELEPHONE ENCOUNTER
Patient picked up her script at the  on 3/23/18.    Claudia DA SILVA  Patient Representative  [facility]

## 2018-03-27 DIAGNOSIS — M12.532 TRAUMATIC ARTHRITIS OF LEFT WRIST: ICD-10-CM

## 2018-03-27 NOTE — TELEPHONE ENCOUNTER
Requested Prescriptions   Pending Prescriptions Disp Refills     traMADol (ULTRAM) 50 MG tablet [Pharmacy Med Name: TRAMADOL HCL 50 MG TABLET] 120 tablet 0     Sig: TAKE 1 TABLET BY MOUTH EVERY 6 HOURS AS NEEDED, MAX 4 TABS/DAY    There is no refill protocol information for this order         Last Written Prescription Date:  1/19/18  Last Fill Quantity: 120,   # refills: 0  Last Office Visit: 3/15/18  Future Office visit:       Routing refill request to provider for review/approval because:  Drug not on the [facility], [facility] or [facility] refill protocol or controlled substance

## 2018-03-28 RX ORDER — TRAMADOL HYDROCHLORIDE 50 MG/1
TABLET ORAL
Qty: 120 TABLET | Refills: 0 | Status: SHIPPED | OUTPATIENT
Start: 2018-03-28 | End: 2018-04-28

## 2018-04-04 ENCOUNTER — TELEPHONE (OUTPATIENT)
Dept: FAMILY MEDICINE | Facility: CLINIC | Age: 58
End: 2018-04-04

## 2018-04-04 DIAGNOSIS — S22.31XA CLOSED FRACTURE OF ONE RIB OF RIGHT SIDE, INITIAL ENCOUNTER: ICD-10-CM

## 2018-04-04 RX ORDER — HYDROCODONE BITARTRATE AND ACETAMINOPHEN 5; 325 MG/1; MG/1
1 TABLET ORAL EVERY 6 HOURS PRN
Qty: 20 TABLET | Refills: 0 | OUTPATIENT
Start: 2018-04-04

## 2018-04-04 NOTE — TELEPHONE ENCOUNTER
Unable to refill Kerens for her wrist, it was for acute rib fracture pain. She can take tramadol as prescribed for her chronic wrist pain. Thanks.

## 2018-04-04 NOTE — TELEPHONE ENCOUNTER
Notified patient of the message below per PCP.  Patient stated understanding and agreeable with the plan of care. Sri Moran, RN-BSN, [facility] Triage.

## 2018-04-04 NOTE — TELEPHONE ENCOUNTER
Reason for Call:  Medication or medication refill:    Do you use a [facility]?  Name of the pharmacy and phone number for the current request:  [facility], pended    Name of the medication requested: HYDROcodone-acetaminophen (NORCO) 5-325 MG per tablet    Other request: patient calling to request a refill, for her wrist.  Please call when done.    Can we leave a detailed message on this number? YES    Phone number patient can be reached at: Home number on file [phone] (home)    Best Time: any    Call taken on 4/4/2018 at 11:29 AM by Galdis Soriano    Requested Prescriptions   Pending Prescriptions Disp Refills     HYDROcodone-acetaminophen (NORCO) 5-325 MG per tablet 20 tablet 0     Sig: Take 1 tablet by mouth every 6 hours as needed for moderate to severe pain maximum 4 tablet(s) per day    There is no refill protocol information for this order

## 2018-04-16 ENCOUNTER — TELEPHONE (OUTPATIENT)
Dept: FAMILY MEDICINE | Facility: CLINIC | Age: 58
End: 2018-04-16

## 2018-04-16 DIAGNOSIS — F33.1 MAJOR DEPRESSIVE DISORDER, RECURRENT EPISODE, MODERATE (H): ICD-10-CM

## 2018-04-16 RX ORDER — FLUOXETINE 40 MG/1
CAPSULE ORAL
Qty: 180 CAPSULE | Refills: 1 | OUTPATIENT
Start: 2018-04-16

## 2018-04-16 NOTE — TELEPHONE ENCOUNTER
Called patient at [phone].   Left message on voicemail to return phone call to triage line at [phone].  Sri Moran RN [facility] Triage.

## 2018-04-16 NOTE — TELEPHONE ENCOUNTER
PHQ-9 score:    PHQ-9 SCORE 9/18/2017   Total Score -   Total Score 16     Routing refill request to provider for review/approval because:  PHQ 9 > 5.  Sri Moran RN [facility] Triage.

## 2018-04-16 NOTE — TELEPHONE ENCOUNTER
This dose is too high - it can cause problems with the heart rhythm. I recommend 60mg per day. Would she be willing to change doses?     Lauren Engelmann, MD

## 2018-04-16 NOTE — TELEPHONE ENCOUNTER
"Requested Prescriptions   Pending Prescriptions Disp Refills     FLUoxetine (PROZAC) 40 MG capsule [Pharmacy Med Name: FLUOXETINE HCL 40 MG CAPSULE] 180 capsule 1    Last Written Prescription Date:  9-25-17  Last Fill Quantity: 180,  # refills: 1   Last office visit: 3/15/2018 with prescribing provider:     Future Office Visit:     Sig: TAKE 2 CAPSULES (80 MG) BY MOUTH DAILY    SSRIs Protocol Failed    4/16/2018  1:17 AM       Failed - PHQ-9 score less than 5 in past 6 months    Please review last PHQ-9 score.          Passed - Patient is age 18 or older       Passed - No active pregnancy on record       Passed - No positive pregnancy test in last 12 months       Passed - Recent (6 mo) or future (30 days) visit within the authorizing provider's specialty    Patient had office visit in the last 6 months or has a visit in the next 30 days with authorizing provider or within the authorizing provider's specialty.  See \"Patient Info\" tab in inbasket, or \"Choose Columns\" in Meds & Orders section of the refill encounter.              "

## 2018-04-17 NOTE — TELEPHONE ENCOUNTER
Notified patient of the message below per PCP.  Patient stated understanding and agreeable with the plan of care. Sri Moran, RN-BSN, [facility] Triage.

## 2018-04-17 NOTE — TELEPHONE ENCOUNTER
Patient returned call, she is willing to go down to 60 mg daily.    Routed to Dr. Engelmann to send new Rx.    Eleanor Mckee RN  [facility]

## 2018-04-28 DIAGNOSIS — M12.532 TRAUMATIC ARTHRITIS OF LEFT WRIST: ICD-10-CM

## 2018-04-30 RX ORDER — TRAMADOL HYDROCHLORIDE 50 MG/1
TABLET ORAL
Qty: 120 TABLET | Refills: 0 | Status: SHIPPED | OUTPATIENT
Start: 2018-04-30 | End: 2018-06-02

## 2018-04-30 NOTE — TELEPHONE ENCOUNTER
Requested Prescriptions   Pending Prescriptions Disp Refills     traMADol (ULTRAM) 50 MG tablet [Pharmacy Med Name: TRAMADOL HCL 50 MG TABLET] 120 tablet 0     Sig: TAKE 1 TAB BY MOUTH EVERY 6 HOURS AS NEEDED.MAX 4 TABS A DAY    There is no refill protocol information for this order         Last Written Prescription Date:  3/28/18  Last Fill Quantity: 120,   # refills: 0  Last Office Visit: 3/15/18  Future Office visit:       Routing refill request to provider for review/approval because:  Drug not on the [facility], [facility] or [facility] refill protocol or controlled substance

## 2018-04-30 NOTE — TELEPHONE ENCOUNTER
Routing refill request to provider for review/approval because:  Drug not on the [facility] refill protocol     Eleanor Mckee RN  [facility]

## 2018-05-01 DIAGNOSIS — M12.532 TRAUMATIC ARTHRITIS OF LEFT WRIST: ICD-10-CM

## 2018-05-01 NOTE — TELEPHONE ENCOUNTER
Requested Prescriptions   Pending Prescriptions Disp Refills     traMADol (ULTRAM) 50 MG tablet [Pharmacy Med Name: TRAMADOL HCL 50 MG TABLET] 120 tablet 0     Sig: TAKE 1 TAB BY MOUTH EVERY 6 HOURS AS NEEDED.MAX 4 TABS A DAY    There is no refill protocol information for this order         Last Written Prescription Date:  4/30/18  Last Fill Quantity: 120,   # refills: 0  Last Office Visit: 3/15/18  Future Office visit:       Routing refill request to provider for review/approval because:  Drug not on the [facility], [facility] or [facility] refill protocol or controlled substance

## 2018-05-02 RX ORDER — TRAMADOL HYDROCHLORIDE 50 MG/1
TABLET ORAL
Qty: 120 TABLET | Refills: 0 | OUTPATIENT
Start: 2018-05-02

## 2018-05-03 DIAGNOSIS — K25.9 GASTRIC ULCER WITHOUT HEMORRHAGE OR PERFORATION, UNSPECIFIED CHRONICITY: ICD-10-CM

## 2018-05-03 RX ORDER — FAMOTIDINE 40 MG/1
TABLET, FILM COATED ORAL
Qty: 30 TABLET | Refills: 3 | Status: SHIPPED | OUTPATIENT
Start: 2018-05-03 | End: 2018-09-01

## 2018-05-03 NOTE — TELEPHONE ENCOUNTER
"Requested Prescriptions   Pending Prescriptions Disp Refills     famotidine (PEPCID) 40 MG tablet [Pharmacy Med Name: FAMOTIDINE 40 MG TABLET] 30 tablet 3    Last Written Prescription Date:  12-19-17  Last Fill Quantity: 30,  # refills: 3   Last office visit: 3/15/2018 with prescribing provider:     Future Office Visit:     Sig: TAKE 1 TABLET (40 MG) BY MOUTH AT BEDTIME    H2 Blockers Protocol Passed    5/3/2018  3:09 AM       Passed - Patient is age 12 or older       Passed - Recent (12 mo) or future (30 days) visit within the authorizing provider's specialty    Patient had office visit in the last 12 months or has a visit in the next 30 days with authorizing provider or within the authorizing provider's specialty.  See \"Patient Info\" tab in inbasket, or \"Choose Columns\" in Meds & Orders section of the refill encounter.              "

## 2018-05-04 NOTE — TELEPHONE ENCOUNTER
Prescription approved per [facility] Refill Protocol.    Eleanor Mckee RN  [facility]

## 2018-05-16 DIAGNOSIS — F33.1 MAJOR DEPRESSIVE DISORDER, RECURRENT EPISODE, MODERATE (H): ICD-10-CM

## 2018-05-16 RX ORDER — ARIPIPRAZOLE 5 MG/1
TABLET ORAL
Qty: 30 TABLET | Refills: 1 | Status: SHIPPED | OUTPATIENT
Start: 2018-05-16 | End: 2018-07-12

## 2018-05-16 NOTE — TELEPHONE ENCOUNTER
Routing refill request to provider for review/approval because:  Failed below protocol.  Sri Moran, [facility] Triage.

## 2018-05-16 NOTE — TELEPHONE ENCOUNTER
"Requested Prescriptions   Pending Prescriptions Disp Refills     ARIPiprazole (ABILIFY) 5 MG tablet [Pharmacy Med Name: ARIPIPRAZOLE 5 MG TABLET] 30 tablet 1    Last Written Prescription Date:  3-16-18  Last Fill Quantity: 30,  # refills: 1   Last office visit: 3/15/2018 with prescribing provider:     Future Office Visit:     Sig: TAKE 1 TABLET (5 MG) BY MOUTH AT BEDTIME    Antipsychotic Medications Failed    5/16/2018  1:23 AM       Failed - Lipid panel on file within the past 12 months    Recent Labs   Lab Test  07/03/12   1135   CHOL  179   TRIG  104   HDL  52   LDL  106   VLDL  21   CHOLHDLRATIO  3.4              Failed - CBC on file in past 12 months    Recent Labs   Lab Test  02/28/17   1122   WBC  3.8*   RBC  4.00   HGB  11.9   HCT  35.5   PLT  276            Failed - A1c or Glucose on file in past 12 months    Recent Labs   Lab Test  06/19/14   1133   GLC  92       Please review patients last 3 weights. If a weight gain of >10 lbs exists, you may refill the prescription once after instructing the patient to schedule an appointment within the next 30 days.    Wt Readings from Last 3 Encounters:   03/15/18 103 lb (46.7 kg)   09/18/17 105 lb (47.6 kg)   09/01/17 106 lb (48.1 kg)            Passed - Blood pressure under 140/90 in past 12 months    BP Readings from Last 3 Encounters:   03/15/18 137/71   09/18/17 139/85   09/01/17 141/77                Passed - Patient is 12 years of age or older       Passed - Heart Rate on file within past 12 months    Pulse Readings from Last 3 Encounters:   03/15/18 83   09/18/17 93   09/01/17 73              Passed - Patient is not pregnant       Passed - No positve pregnancy test on file in past 12 months       Passed - Recent (6 mo) or future (30 days) visit within the authorizing provider's specialty    Patient had office visit in the last 6 months or has a visit in the next 30 days with authorizing provider or within the authorizing provider's specialty.  See \"Patient " "Info\" tab in inbasket, or \"Choose Columns\" in Meds & Orders section of the refill encounter.              "

## 2018-06-02 DIAGNOSIS — M12.532 TRAUMATIC ARTHRITIS OF LEFT WRIST: ICD-10-CM

## 2018-06-04 RX ORDER — TRAMADOL HYDROCHLORIDE 50 MG/1
TABLET ORAL
Qty: 120 TABLET | Refills: 0 | Status: SHIPPED | OUTPATIENT
Start: 2018-06-04 | End: 2018-07-04

## 2018-06-04 NOTE — TELEPHONE ENCOUNTER
Requested Prescriptions   Pending Prescriptions Disp Refills     traMADol (ULTRAM) 50 MG tablet [Pharmacy Med Name: TRAMADOL HCL 50 MG TABLET] 120 tablet 0     Sig: TAKE 1 TAB BY MOUTH EVERY 6 HOURS AS NEEDED.MAX 4 A DAY    There is no refill protocol information for this order         Last Written Prescription Date:  4/30/18  Last Fill Quantity: 120,   # refills: 0  Last Office Visit: 3/15/18  Future Office visit:       Routing refill request to provider for review/approval because:  Drug not on the [facility], [facility] or [facility] refill protocol or controlled substance

## 2018-06-05 ENCOUNTER — TELEPHONE (OUTPATIENT)
Dept: FAMILY MEDICINE | Facility: CLINIC | Age: 58
End: 2018-06-05

## 2018-06-05 DIAGNOSIS — F33.1 MAJOR DEPRESSIVE DISORDER, RECURRENT EPISODE, MODERATE (H): ICD-10-CM

## 2018-06-05 NOTE — TELEPHONE ENCOUNTER
"Requested Prescriptions   Pending Prescriptions Disp Refills     FLUoxetine (PROZAC) 40 MG capsule [Pharmacy Med Name: FLUOXETINE HCL 40 MG CAPSULE] 180 capsule 1    Last Written Prescription Date:  4/17/18  Last Fill Quantity: 90,  # refills: 3   Last office visit: 3/15/2018 with prescribing provider:     Future Office Visit:     Sig: TAKE 2 CAPSULES (80 MG) BY MOUTH DAILY    SSRIs Protocol Failed    6/5/2018 10:53 AM       Failed - PHQ-9 score less than 5 in past 6 months    Please review last PHQ-9 score.          Passed - Patient is age 18 or older       Passed - No active pregnancy on record       Passed - No positive pregnancy test in last 12 months       Passed - Recent (6 mo) or future (30 days) visit within the authorizing provider's specialty    Patient had office visit in the last 6 months or has a visit in the next 30 days with authorizing provider or within the authorizing provider's specialty.  See \"Patient Info\" tab in inbasket, or \"Choose Columns\" in Meds & Orders section of the refill encounter.              "

## 2018-06-06 RX ORDER — FLUOXETINE 40 MG/1
CAPSULE ORAL
Qty: 180 CAPSULE | Refills: 1 | OUTPATIENT
Start: 2018-06-06

## 2018-06-06 NOTE — TELEPHONE ENCOUNTER
"Patient returned call, she is taking the 60 mg dosing, she says the 80 mg was an auto refill.  Does not need 60 mg dose sent.    \"Refusal\" routed back to pharmacy with note.    Eleanor Mckee RN  [facility]      "

## 2018-06-06 NOTE — TELEPHONE ENCOUNTER
Last Rx sent by Dr. Engelmann was for 60 mg daily (3 of the 20 mg tabs); this request is for 80 mg daily (2 of the 40 mg tabs).    I see 4/16/18 phone encounter provider advised patient the 80 mg dose is too high and 60 mg dose was agreed upon, Rx sent, should not need refills of the 60 mg level until August.    Attempted to call patient at home/mobile number, left message on voicemail; patient was instructed to return call to [facility] RN directly on the RN call back line at [phone].   Did she request this?  What dose is she taking?    Eleanor Mckee, [facility]  [facility]

## 2018-06-12 ENCOUNTER — TELEPHONE (OUTPATIENT)
Dept: FAMILY MEDICINE | Facility: CLINIC | Age: 58
End: 2018-06-12

## 2018-06-12 NOTE — TELEPHONE ENCOUNTER
Panel Management Review      Patient has the following on her problem list: None      Composite cancer screening  Chart review shows that this patient is due/due soon for the following Pap Smear, Mammogram and Fecal Colorectal (FIT)  Summary:    Patient is due/failing the following:   FIT, MAMMOGRAM, PAP and PHYSICAL    Action needed:   Patient needs referral/order: Mammogram and OV for Physical with pap and FIT card    Type of outreach:    Sent letter.    Questions for provider review:    None                                                                                                                                    Lore Sharma MA       Chart routed to Care Team .

## 2018-06-12 NOTE — LETTER
June 12, 2018    Nataliia Flanagan  [address] [id number]    Dear Nataliia    We care about your health and have reviewed your health plan. We have reviewed your medical conditions, medication list, and lab results and are making recommendations based on this review, to better manage your health.    You are in particular need of attention regarding:  - Scheduling a Breast Cancer Screening (Mammography) [phone]  - Completing a Colon Cancer Screening (FIT) - Please complete FIT test which can be picked up at the clinic and mail completed test to clinic.  - Scheduling a Physical with a Cervical Cancer Screening (Pap Smear) age 64 and younger [phone]      Here is a list of Health Maintenance topics that are due now or due soon:  Health Maintenance Due   Topic Date Due     TETANUS IMMUNIZATION (SYSTEM ASSIGNED)  11/22/1978     HIV SCREEN (SYSTEM ASSIGNED)  11/22/1978     HEPATITIS C SCREENING  11/22/1978     PAP SCREENING Q3 YR (SYSTEM ASSIGNED)  11/22/1981     MAMMO SCREEN Q2 YR (SYSTEM ASSIGNED)  11/22/2010     FIT Q1 YR  05/19/2015     ADVANCE DIRECTIVE PLANNING Q5 YRS  11/22/2015     LIPID SCREEN Q5 YR FEMALE (SYSTEM ASSIGNED)  07/03/2017     PHQ-9 Q6 MONTHS  03/18/2018     We will be calling you in the next couple of weeks to help you schedule any appointments that are needed.  Please call us at [phone] (or use [facility]) to address the above recommendations.     Thank you for trusting [facility] and we appreciate the opportunity to serve you.  We look forward to supporting your healthcare needs in the future.    Healthy Regards,    Your Care Team

## 2018-07-02 DIAGNOSIS — G47.00 PERSISTENT INSOMNIA: ICD-10-CM

## 2018-07-02 NOTE — TELEPHONE ENCOUNTER
"Requested Prescriptions   Pending Prescriptions Disp Refills     traZODone (DESYREL) 100 MG tablet [Pharmacy Med Name: TRAZODONE 100 MG TABLET] 90 tablet 1    Last Written Prescription Date:  1/5/18  Last Fill Quantity: 90,  # refills: 1   Last office visit: 3/15/2018 with prescribing provider:     Future Office Visit:     Sig: TAKE 1 TABLET BY MOUTH AT BEDTIME AS NEEDED FOR SLEEP    Serotonin Modulators Passed    7/2/2018  1:18 AM       Passed - Recent (12 mo) or future (30 days) visit within the authorizing provider's specialty    Patient had office visit in the last 12 months or has a visit in the next 30 days with authorizing provider or within the authorizing provider's specialty.  See \"Patient Info\" tab in inbasket, or \"Choose Columns\" in Meds & Orders section of the refill encounter.           Passed - Patient is age 18 or older       Passed - No active pregnancy on record       Passed - No positive pregnancy test in past 12 months          "

## 2018-07-03 RX ORDER — TRAZODONE HYDROCHLORIDE 100 MG/1
TABLET ORAL
Qty: 90 TABLET | Refills: 1 | Status: SHIPPED | OUTPATIENT
Start: 2018-07-03 | End: 2018-12-30

## 2018-07-03 NOTE — TELEPHONE ENCOUNTER
Prescription approved per [facility] Refill Protocol.  Albania Hollis RN  [facility]

## 2018-07-04 DIAGNOSIS — M12.532 TRAUMATIC ARTHRITIS OF LEFT WRIST: ICD-10-CM

## 2018-07-05 RX ORDER — TRAMADOL HYDROCHLORIDE 50 MG/1
TABLET ORAL
Qty: 120 TABLET | Refills: 0 | Status: SHIPPED | OUTPATIENT
Start: 2018-07-05 | End: 2018-08-05

## 2018-07-05 NOTE — TELEPHONE ENCOUNTER
Requested Prescriptions   Pending Prescriptions Disp Refills     traMADol (ULTRAM) 50 MG tablet [Pharmacy Med Name: TRAMADOL HCL 50 MG TABLET] 120 tablet 0     Sig: TAKE 1 TAB BY MOUTH EVERY 6 HOURS AS NEEDED.MAX 4 A DAY    There is no refill protocol information for this order        Last Written Prescription Date:  6/4/2018  Last Fill Quantity: 120,  # refills: 0   Last office visit: 3/15/2018 with prescribing provider:  Engelmann - closed fracture of rib   Future Office Visit:    Routing refill request to provider for review/approval because:  Drug not on the [facility] refill protocol       Albania Hollis RN  [facility]

## 2018-07-12 DIAGNOSIS — F33.1 MAJOR DEPRESSIVE DISORDER, RECURRENT EPISODE, MODERATE (H): ICD-10-CM

## 2018-07-12 NOTE — TELEPHONE ENCOUNTER
Patient returned call. TC assisted with scheduling annual physical and PAP on 07/20. FIT and mammogram can be discussed at that appointment.

## 2018-07-12 NOTE — TELEPHONE ENCOUNTER
Requested Prescriptions   Pending Prescriptions Disp Refills     ARIPiprazole (ABILIFY) 5 MG tablet [Pharmacy Med Name: ARIPIPRAZOLE 5 MG TABLET] 30 tablet 1    Last Written Prescription Date:  5/16/18  Last Fill Quantity: 30,  # refills: 1   Last office visit: 3/15/2018 with prescribing provider:     Future Office Visit:     Sig: TAKE 1 TABLET (5 MG) BY MOUTH AT BEDTIME    Antipsychotic Medications Failed    7/12/2018  1:14 AM       Failed - Lipid panel on file within the past 12 months    Recent Labs   Lab Test  07/03/12   1135   CHOL  179   TRIG  104   HDL  52   LDL  106   VLDL  21   CHOLHDLRATIO  3.4              Failed - CBC on file in past 12 months    Recent Labs   Lab Test  02/28/17   1122   WBC  3.8*   RBC  4.00   HGB  11.9   HCT  35.5   PLT  276       For [facility] ONLY: [id number] = WBC, [id number] = RBC         Failed - A1c or Glucose on file in past 12 months    Recent Labs   Lab Test  06/19/14   1133   GLC  92       Please review patients last 3 weights. If a weight gain of >10 lbs exists, you may refill the prescription once after instructing the patient to schedule an appointment within the next 30 days.    Wt Readings from Last 3 Encounters:   03/15/18 103 lb (46.7 kg)   09/18/17 105 lb (47.6 kg)   09/01/17 106 lb (48.1 kg)            Passed - Blood pressure under 140/90 in past 12 months    BP Readings from Last 3 Encounters:   03/15/18 137/71   09/18/17 139/85   09/01/17 141/77                Passed - Patient is 12 years of age or older       Passed - Heart Rate on file within past 12 months    Pulse Readings from Last 3 Encounters:   03/15/18 83   09/18/17 93   09/01/17 73              Passed - Patient is not pregnant       Passed - No positve pregnancy test on file in past 12 months       Passed - Recent (6 mo) or future (30 days) visit within the authorizing provider's specialty    Patient had office visit in the last 6 months or has a visit in the next 30 days with authorizing provider or within the  "authorizing provider's specialty.  See \"Patient Info\" tab in inbasket, or \"Choose Columns\" in Meds & Orders section of the refill encounter.              "

## 2018-07-13 RX ORDER — ARIPIPRAZOLE 5 MG/1
TABLET ORAL
Qty: 30 TABLET | Refills: 1 | Status: SHIPPED | OUTPATIENT
Start: 2018-07-13 | End: 2018-09-05

## 2018-07-20 ENCOUNTER — OFFICE VISIT (OUTPATIENT)
Dept: FAMILY MEDICINE | Facility: CLINIC | Age: 58
End: 2018-07-20
Payer: COMMERCIAL

## 2018-07-20 VITALS
TEMPERATURE: 98 F | HEART RATE: 71 BPM | SYSTOLIC BLOOD PRESSURE: 134 MMHG | WEIGHT: 98 LBS | BODY MASS INDEX: 17.22 KG/M2 | DIASTOLIC BLOOD PRESSURE: 83 MMHG

## 2018-07-20 DIAGNOSIS — Z11.4 SCREENING FOR HIV (HUMAN IMMUNODEFICIENCY VIRUS): ICD-10-CM

## 2018-07-20 DIAGNOSIS — Z13.1 SCREENING FOR DIABETES MELLITUS: ICD-10-CM

## 2018-07-20 DIAGNOSIS — Z12.4 SCREENING FOR MALIGNANT NEOPLASM OF CERVIX: ICD-10-CM

## 2018-07-20 DIAGNOSIS — Z00.00 ROUTINE GENERAL MEDICAL EXAMINATION AT A HEALTH CARE FACILITY: Primary | ICD-10-CM

## 2018-07-20 DIAGNOSIS — Z11.59 NEED FOR HEPATITIS C SCREENING TEST: ICD-10-CM

## 2018-07-20 DIAGNOSIS — Z12.31 VISIT FOR SCREENING MAMMOGRAM: ICD-10-CM

## 2018-07-20 DIAGNOSIS — Z13.220 SCREENING FOR LIPID DISORDERS: ICD-10-CM

## 2018-07-20 DIAGNOSIS — Z12.11 SCREEN FOR COLON CANCER: ICD-10-CM

## 2018-07-20 LAB
CHOLEST SERPL-MCNC: 194 MG/DL
GLUCOSE SERPL-MCNC: 87 MG/DL (ref 70–99)
HDLC SERPL-MCNC: 78 MG/DL
LDLC SERPL CALC-MCNC: 103 MG/DL
NONHDLC SERPL-MCNC: 116 MG/DL
TRIGL SERPL-MCNC: 67 MG/DL

## 2018-07-20 PROCEDURE — 99396 PREV VISIT EST AGE 40-64: CPT | Performed by: FAMILY MEDICINE

## 2018-07-20 PROCEDURE — 36415 COLL VENOUS BLD VENIPUNCTURE: CPT | Performed by: FAMILY MEDICINE

## 2018-07-20 PROCEDURE — 86803 HEPATITIS C AB TEST: CPT | Performed by: FAMILY MEDICINE

## 2018-07-20 PROCEDURE — 80061 LIPID PANEL: CPT | Performed by: FAMILY MEDICINE

## 2018-07-20 PROCEDURE — G0123 SCREEN CERV/VAG THIN LAYER: HCPCS | Performed by: FAMILY MEDICINE

## 2018-07-20 PROCEDURE — 87624 HPV HI-RISK TYP POOLED RSLT: CPT | Performed by: FAMILY MEDICINE

## 2018-07-20 PROCEDURE — 87389 HIV-1 AG W/HIV-1&-2 AB AG IA: CPT | Performed by: FAMILY MEDICINE

## 2018-07-20 PROCEDURE — 82947 ASSAY GLUCOSE BLOOD QUANT: CPT | Performed by: FAMILY MEDICINE

## 2018-07-20 NOTE — PROGRESS NOTES
SUBJECTIVE:   CC: Nataliia Flanagan is an 57 year old woman who presents for preventive health visit.     Physical   Annual:     Getting at least 3 servings of Calcium per day:  Yes    Bi-annual eye exam:  NO    Dental care twice a year:  NO    Sleep apnea or symptoms of sleep apnea:  None    Diet:  Regular (no restrictions)    Frequency of exercise:  4-5 days/week    Duration of exercise:  Less than 15 minutes    Taking medications regularly:  Yes    Medication side effects:  None    Additional concerns today:  No      Today's PHQ-2 Score:   PHQ-2 ( 1999 [employer]) 7/20/2018   Q1: Little interest or pleasure in doing things 1   Q2: Feeling down, depressed or hopeless 1   PHQ-2 Score 2   Q1: Little interest or pleasure in doing things Several days   Q2: Feeling down, depressed or hopeless Several days   PHQ-2 Score 2       Abuse: Current or Past(Physical, Sexual or Emotional)- No  Do you feel safe in your environment - Yes    Social History   Substance Use Topics     Smoking status: Never Smoker     Smokeless tobacco: Never Used     Alcohol use No     Alcohol Use 7/20/2018   If you drink alcohol do you typically have greater than 3 drinks per day OR greater than 7 drinks per week? No       Reviewed orders with patient.  Reviewed health maintenance and updated orders accordingly - Yes  Labs reviewed in EPIC  BP Readings from Last 3 Encounters:   07/20/18 134/83   03/15/18 137/71   09/18/17 139/85    Wt Readings from Last 3 Encounters:   07/20/18 98 lb (44.5 kg)   03/15/18 103 lb (46.7 kg)   09/18/17 105 lb (47.6 kg)                    Patient over age 50, mutual decision to screen reflected in health maintenance.    Pertinent mammograms are reviewed under the imaging tab.  History of abnormal Pap smear: NO - age 30-65 PAP every 5 years with negative HPV co-testing recommended     Reviewed and updated as needed this visit by clinical staff  Tobacco  Allergies  Meds  Med Hx  Surg Hx  Fam Hx  Soc Hx        Reviewed  and updated as needed this visit by Provider            Review of Systems  CONSTITUTIONAL: NEGATIVE for fever, chills, change in weight  INTEGUMENTARY/SKIN: NEGATIVE for worrisome rashes, moles or lesions  EYES: NEGATIVE for vision changes or irritation  ENT: NEGATIVE for ear, mouth and throat problems  RESP: NEGATIVE for significant cough or SOB  BREAST: NEGATIVE for masses, tenderness or discharge  CV: NEGATIVE for chest pain, palpitations or peripheral edema  GI: NEGATIVE for nausea, abdominal pain, heartburn, or change in bowel habits  : NEGATIVE for unusual urinary or vaginal symptoms. No vaginal bleeding.  MUSCULOSKELETAL: NEGATIVE for significant arthralgias or myalgia  NEURO: NEGATIVE for weakness, dizziness or paresthesias  PSYCHIATRIC: NEGATIVE for changes in mood or affect      OBJECTIVE:   /83 (BP Location: Left arm, Patient Position: Sitting, Cuff Size: Child)  Pulse 71  Temp 98  F (36.7  C) (Oral)  Wt 98 lb (44.5 kg)  BMI 17.22 kg/m2  Physical Exam  GENERAL: healthy, alert, no distress and thin  EYES: Eyes grossly normal to inspection, PERRL and conjunctivae and sclerae normal  NECK: no adenopathy, no asymmetry, masses, or scars and thyroid normal to palpation  RESP: lungs clear to auscultation - no rales, rhonchi or wheezes  BREAST: no palpable axillary masses or adenopathy and implant bilaterally  CV: regular rate and rhythm, normal S1 S2, no S3 or S4, no murmur, click or rub, no peripheral edema and peripheral pulses strong  ABDOMEN: soft, nontender, no hepatosplenomegaly, no masses and bowel sounds normal   (female): normal female external genitalia, normal urethral meatus, vaginal mucosa, normal cervix/adnexa/uterus without masses or discharge  MS: no gross musculoskeletal defects noted, no edema  SKIN: no suspicious lesions or rashes  NEURO: Normal strength and tone, mentation intact and speech normal  BACK: no CVA tenderness, no paralumbar tenderness  PSYCH: mentation appears  "normal, affect normal/bright  LYMPH: no cervical, supraclavicular, axillary, or inguinal adenopathy    Diagnostic Test Results:  No results found for this or any previous visit (from the past 24 hour(s)).    ASSESSMENT/PLAN:       ICD-10-CM    1. Routine general medical examination at a health care facility Z00.00    2. Screen for colon cancer Z12.11 Fecal colorectal cancer screen FIT - Future (S+30)   3. Visit for screening mammogram Z12.31 MA Screen with Implants Bilateral w/Mark   4. Screening for malignant neoplasm of cervix Z12.4 Pap imaged thin layer screen with HPV - recommended age 30 - 65 years (select HPV order below)     HPV High Risk Types DNA Cervical   5. Need for hepatitis C screening test Z11.59 Hepatitis C Screen Reflex to HCV RNA Quant and Genotype   6. Screening for HIV (human immunodeficiency virus) Z11.4 HIV Screening   7. Screening for lipid disorders Z13.220 Lipid panel reflex to direct LDL Fasting   8. Screening for diabetes mellitus Z13.1 Glucose     Preventive medicine topics reviewed.    updated as above.    COUNSELING:  Reviewed preventive health counseling, as reflected in patient instructions       Regular exercise       Healthy diet/nutrition       Advance Care Planning    BP Readings from Last 1 Encounters:   07/20/18 134/83     Estimated body mass index is 17.22 kg/(m^2) as calculated from the following:    Height as of 3/15/18: 5' 3.25\" (1.607 m).    Weight as of this encounter: 98 lb (44.5 kg).      Weight management plan noted, stable and monitoring     reports that she has never smoked. She has never used smokeless tobacco.      Counseling Resources:  ATP IV Guidelines  Pooled Cohorts Equation Calculator  Breast Cancer Risk Calculator  FRAX Risk Assessment  ICSI Preventive Guidelines  Dietary Guidelines for Americans, 2010  [employer] MyPlate  ASA Prophylaxis  Lung CA Screening    Lauren A. Engelmann, MD  [facility]  Answers for HPI/ROS submitted by the " patient on 7/20/2018   PHQ-2 Score: 2

## 2018-07-20 NOTE — MR AVS SNAPSHOT
After Visit Summary   7/20/2018    Nataliia Flanagan    MRN: [id number]           Patient Information     Date Of Birth          1960        Visit Information        Provider Department      7/20/2018 1:40 PM Engelmann, Lauren Anneliese, MD [facility]        Today's Diagnoses     Screening for lipid disorders    -  1    Screen for colon cancer        Visit for screening mammogram        Screening for malignant neoplasm of cervix        Need for hepatitis C screening test        Screening for HIV (human immunodeficiency virus)        Need for prophylactic vaccination with tetanus-diphtheria (TD)        Screening for diabetes mellitus           Follow-ups after your visit        Your next 10 appointments already scheduled     Aug 13, 2018  1:30 PM CDT   MA SCREENING DIGITAL BILATERAL with CPMA1   [facility] ([facility]    [address]  [facility] [address] [phone]           Do not use any powder, lotion or deodorant under your arms or on your breast. If you do, we will ask you to remove it before your exam.  Wear comfortable, two-piece clothing.  If you have any allergies, tell your care team.  Bring any previous mammograms from other facilities or have them mailed to the breast center.              Future tests that were ordered for you today     Open Future Orders        Priority Expected Expires Ordered    Fecal colorectal cancer screen FIT - Future (S+30) Routine 8/10/2018 8/19/2018 7/20/2018    MA Screen with Implants Bilateral w/Mark Routine  7/20/2019 7/20/2018            Who to contact     If you have questions or need follow up information about today's clinic visit or your schedule please contact [facility] directly at [phone].  Normal or non-critical lab and imaging results will be communicated to you by MyChart, letter or phone within 4 business days after the clinic  has received the results. If you do not hear from us within 7 days, please contact the clinic through [employer] or phone. If you have a critical or abnormal lab result, we will notify you by phone as soon as possible.  Submit refill requests through [employer] or call your pharmacy and they will forward the refill request to us. Please allow 3 business days for your refill to be completed.          Additional Information About Your Visit        [facility] Information     [employer] gives you secure access to your electronic health record. If you see a primary care provider, you can also send messages to your care team and make appointments. If you have questions, please call your primary care clinic.  If you do not have a primary care provider, please call [phone] and they will assist you.        Care EveryWhere ID     This is your Care EveryWhere ID. This could be used by other organizations to access your [place] medical records  [id number]        Your Vitals Were     Pulse Temperature BMI (Body Mass Index)             71 98  F (36.7  C) (Oral) 17.22 kg/m2          Blood Pressure from Last 3 Encounters:   07/20/18 134/83   03/15/18 137/71   09/18/17 139/85    Weight from Last 3 Encounters:   07/20/18 98 lb (44.5 kg)   03/15/18 103 lb (46.7 kg)   09/18/17 105 lb (47.6 kg)              We Performed the Following     Glucose     Hepatitis C Screen Reflex to HCV RNA Quant and Genotype     HIV Screening     HPV High Risk Types DNA Cervical     Lipid panel reflex to direct LDL Fasting     Pap imaged thin layer screen with HPV - recommended age 30 - 65 years (select HPV order below)          Today's Medication Changes          These changes are accurate as of 7/20/18  2:36 PM.  If you have any questions, ask your nurse or doctor.               These medicines have changed or have updated prescriptions.        Dose/Directions    FLUoxetine 20 MG capsule   Commonly known as:  PROzac   This may have changed:  additional  instructions   Used for:  Major depressive disorder, recurrent episode, moderate (H)        Dose:  60 mg   Take 3 capsules (60 mg) by mouth daily   Quantity:  90 capsule   Refills:  3                Primary Care Provider Office Phone # Fax #    Lauren Anneliese Engelmann, [phone]       [facility] [address]  [facility] [address]        Equal Access to Services     TATE MARTINEZ : Hadii aad ku hadasho Soomaali, waaxda luqadaha, qaybta kaalmada adeegyada, waxay idiin hayaan adeeg kharash la'aan ah. So [facility] [phone].    ATENCIÓN: Si habla español, tiene a ordoñez disposición servicios gratuitos de asistencia lingüística. Norm al [phone].    We comply with applicable federal civil rights laws and [place] laws. We do not discriminate on the basis of race, color, national origin, age, disability, sex, sexual orientation, or gender identity.            Thank you!     Thank you for choosing [facility]  for your care. Our goal is always to provide you with excellent care. Hearing back from our patients is one way we can continue to improve our services. Please take a few minutes to complete the written survey that you may receive in the mail after your visit with us. Thank you!             Your Updated Medication List - Protect others around you: Learn how to safely use, store and throw away your medicines at www.disposemymeds.org.          This list is accurate as of 7/20/18  2:36 PM.  Always use your most recent med list.                   Brand Name Dispense Instructions for use Diagnosis    ARIPiprazole 5 MG tablet    ABILIFY    30 tablet    TAKE 1 TABLET (5 MG) BY MOUTH AT BEDTIME    Major depressive disorder, recurrent episode, moderate (H)       EQL 12 HOUR DECONGESTANT 120 MG 12 hr tablet   Generic drug:  pseudoePHEDrine     180 tablet    TAKE 1 TABLET BY MOUTH EVERY 12 HOURS    Chronic non-seasonal allergic rhinitis, unspecified  trigger       EXCEDRIN PO      Take 2 tablets by mouth as needed        famotidine 40 MG tablet    PEPCID    30 tablet    TAKE 1 TABLET (40 MG) BY MOUTH AT BEDTIME    Gastric ulcer without hemorrhage or perforation, unspecified chronicity       FLUoxetine 20 MG capsule    PROzac    90 capsule    Take 3 capsules (60 mg) by mouth daily    Major depressive disorder, recurrent episode, moderate (H)       HYDROcodone-acetaminophen 5-325 MG per tablet    NORCO    20 tablet    Take 1 tablet by mouth every 6 hours as needed for moderate to severe pain maximum 4 tablet(s) per day    Closed fracture of one rib of right side, initial encounter       traMADol 50 MG tablet    ULTRAM    120 tablet    TAKE 1 TAB BY MOUTH EVERY 6 HOURS AS NEEDED.MAX 4 A DAY    Traumatic arthritis of left wrist       traZODone 100 MG tablet    DESYREL    90 tablet    TAKE 1 TABLET BY MOUTH AT BEDTIME AS NEEDED FOR SLEEP    Persistent insomnia

## 2018-07-22 LAB — HCV AB SERPL QL IA: NONREACTIVE

## 2018-07-23 LAB — HIV 1+2 AB+HIV1 P24 AG SERPL QL IA: NONREACTIVE

## 2018-07-24 LAB
COPATH REPORT: NORMAL
PAP: NORMAL

## 2018-07-26 LAB
FINAL DIAGNOSIS: NORMAL
HPV HR 12 DNA CVX QL NAA+PROBE: NEGATIVE
HPV16 DNA SPEC QL NAA+PROBE: NEGATIVE
HPV18 DNA SPEC QL NAA+PROBE: NEGATIVE
SPECIMEN DESCRIPTION: NORMAL
SPECIMEN SOURCE CVX/VAG CYTO: NORMAL

## 2018-08-05 DIAGNOSIS — M12.532 TRAUMATIC ARTHRITIS OF LEFT WRIST: ICD-10-CM

## 2018-08-06 NOTE — TELEPHONE ENCOUNTER
Requested Prescriptions   Pending Prescriptions Disp Refills     traMADol (ULTRAM) 50 MG tablet [Pharmacy Med Name: TRAMADOL HCL 50 MG TABLET] 120 tablet 0     Sig: TAKE 1 TABLET BY MOUTH EVERY 6 HOURS AS NEEDED. MAX 4 A DAY    There is no refill protocol information for this order         Last Written Prescription Date:  7/5/18  Last Fill Quantity: 120,   # refills: 0  Last Office Visit: 7/20/18  Future Office visit:       Routing refill request to provider for review/approval because:  Drug not on the [facility], [facility] or [facility] refill protocol or controlled substance

## 2018-08-08 RX ORDER — TRAMADOL HYDROCHLORIDE 50 MG/1
TABLET ORAL
Qty: 120 TABLET | Refills: 5 | Status: SHIPPED | OUTPATIENT
Start: 2018-08-08 | End: 2019-01-30

## 2018-08-09 DIAGNOSIS — Z12.11 SCREEN FOR COLON CANCER: ICD-10-CM

## 2018-08-09 LAB — HEMOCCULT STL QL IA: NEGATIVE

## 2018-08-09 PROCEDURE — 82274 ASSAY TEST FOR BLOOD FECAL: CPT | Performed by: FAMILY MEDICINE

## 2018-09-01 DIAGNOSIS — K25.9 GASTRIC ULCER WITHOUT HEMORRHAGE OR PERFORATION, UNSPECIFIED CHRONICITY: ICD-10-CM

## 2018-09-04 RX ORDER — FAMOTIDINE 40 MG/1
TABLET, FILM COATED ORAL
Qty: 30 TABLET | Refills: 1 | Status: SHIPPED | OUTPATIENT
Start: 2018-09-04 | End: 2018-10-31

## 2018-09-04 NOTE — TELEPHONE ENCOUNTER
"Requested Prescriptions   Pending Prescriptions Disp Refills     famotidine (PEPCID) 40 MG tablet [Pharmacy Med Name: FAMOTIDINE 40 MG TABLET] 30 tablet 3    Last Written Prescription Date:  5/3/18  Last Fill Quantity: 30,  # refills: 3   Last office visit: 7/20/2018 with prescribing provider:     Future Office Visit:     Sig: TAKE 1 TABLET BY MOUTH AT BEDTIME    H2 Blockers Protocol Passed    9/1/2018  1:25 AM       Passed - Patient is age 12 or older       Passed - Recent (12 mo) or future (30 days) visit within the authorizing provider's specialty    Patient had office visit in the last 12 months or has a visit in the next 30 days with authorizing provider or within the authorizing provider's specialty.  See \"Patient Info\" tab in inbasket, or \"Choose Columns\" in Meds & Orders section of the refill encounter.              "

## 2018-09-04 NOTE — TELEPHONE ENCOUNTER
Prescription approved per [facility] Refill Protocol.  Sri Moran, RN CPC Triage.

## 2018-09-05 DIAGNOSIS — F33.1 MAJOR DEPRESSIVE DISORDER, RECURRENT EPISODE, MODERATE (H): ICD-10-CM

## 2018-09-05 RX ORDER — ARIPIPRAZOLE 5 MG/1
TABLET ORAL
Qty: 30 TABLET | Refills: 1 | Status: SHIPPED | OUTPATIENT
Start: 2018-09-05 | End: 2018-10-31

## 2018-09-05 NOTE — TELEPHONE ENCOUNTER
Requested Prescriptions   Pending Prescriptions Disp Refills     ARIPiprazole (ABILIFY) 5 MG tablet [Pharmacy Med Name: ARIPIPRAZOLE 5 MG TABLET] 30 tablet 1    Last Written Prescription Date:  7-13-18  Last Fill Quantity: 30,  # refills: 1   Last office visit: 7/20/2018 with prescribing provider:  7-20-18   Future Office Visit:     Sig: TAKE 1 TABLET (5 MG) BY MOUTH AT BEDTIME    Antipsychotic Medications Failed    9/5/2018  1:36 AM       Failed - CBC on file in past 12 months    Recent Labs   Lab Test  02/28/17   1122   WBC  3.8*   RBC  4.00   HGB  11.9   HCT  35.5   PLT  276       For [facility] ONLY: SYDY622 = WBC, VAOF222 = RBC         Passed - Blood pressure under 140/90 in past 12 months    BP Readings from Last 3 Encounters:   07/20/18 134/83   03/15/18 137/71   09/18/17 139/85                Passed - Patient is 12 years of age or older       Passed - Lipid panel on file within the past 12 months    Recent Labs   Lab Test  07/20/18   1442  07/03/12   1135   CHOL  194  179   TRIG  67  104   HDL  78  52   LDL  103*  106   NHDL  116   --    VLDL   --   21   CHOLHDLRATIO   --   3.4              Passed - Heart Rate on file within past 12 months    Pulse Readings from Last 3 Encounters:   07/20/18 71   03/15/18 83   09/18/17 93              Passed - A1c or Glucose on file in past 12 months    Recent Labs   Lab Test  07/20/18   1442   GLC  87       Please review patients last 3 weights. If a weight gain of >10 lbs exists, you may refill the prescription once after instructing the patient to schedule an appointment within the next 30 days.    Wt Readings from Last 3 Encounters:   07/20/18 98 lb (44.5 kg)   03/15/18 103 lb (46.7 kg)   09/18/17 105 lb (47.6 kg)            Passed - Patient is not pregnant       Passed - No positve pregnancy test on file in past 12 months       Passed - Recent (6 mo) or future (30 days) visit within the authorizing provider's specialty    Patient had office visit in the last 6 months or has a  "visit in the next 30 days with authorizing provider or within the authorizing provider's specialty.  See \"Patient Info\" tab in inbasket, or \"Choose Columns\" in Meds & Orders section of the refill encounter.              "

## 2018-09-05 NOTE — TELEPHONE ENCOUNTER
Routing refill request to provider for review/approval because:  Labs not current  Sri Moran RN CPC Triage.

## 2018-10-31 DIAGNOSIS — F33.1 MAJOR DEPRESSIVE DISORDER, RECURRENT EPISODE, MODERATE (H): ICD-10-CM

## 2018-10-31 DIAGNOSIS — K25.9 GASTRIC ULCER WITHOUT HEMORRHAGE OR PERFORATION, UNSPECIFIED CHRONICITY: ICD-10-CM

## 2018-10-31 NOTE — TELEPHONE ENCOUNTER
Requested Prescriptions   Pending Prescriptions Disp Refills     ARIPiprazole (ABILIFY) 5 MG tablet [Pharmacy Med Name: ARIPIPRAZOLE 5 MG TABLET] 30 tablet 1    Last Written Prescription Date:  9-5-18  Last Fill Quantity: 30,  # refills: 1   Last office visit: 7/20/2018 with prescribing provider:  7-20-18   Future Office Visit:     Sig: TAKE 1 TABLET (5 MG) BY MOUTH AT BEDTIME    Antipsychotic Medications Failed    10/31/2018  1:53 AM       Failed - CBC on file in past 12 months    Recent Labs   Lab Test  02/28/17   1122   WBC  3.8*   RBC  4.00   HGB  11.9   HCT  35.5   PLT  276                Passed - Blood pressure under 140/90 in past 12 months    BP Readings from Last 3 Encounters:   07/20/18 134/83   03/15/18 137/71   09/18/17 139/85                Passed - Patient is 12 years of age or older       Passed - Lipid panel on file within the past 12 months    Recent Labs   Lab Test  07/20/18   1442  07/03/12   1135   CHOL  194  179   TRIG  67  104   HDL  78  52   LDL  103*  106   NHDL  116   --    VLDL   --   21   CHOLHDLRATIO   --   3.4              Passed - Heart Rate on file within past 12 months    Pulse Readings from Last 3 Encounters:   07/20/18 71   03/15/18 83   09/18/17 93              Passed - A1c or Glucose on file in past 12 months    Recent Labs   Lab Test  07/20/18   1442   GLC  87       Please review patients last 3 weights. If a weight gain of >10 lbs exists, you may refill the prescription once after instructing the patient to schedule an appointment within the next 30 days.    Wt Readings from Last 3 Encounters:   07/20/18 98 lb (44.5 kg)   03/15/18 103 lb (46.7 kg)   09/18/17 105 lb (47.6 kg)            Passed - Patient is not pregnant       Passed - No positve pregnancy test on file in past 12 months       Passed - Recent (6 mo) or future (30 days) visit within the authorizing provider's specialty    Patient had office visit in the last 6 months or has a visit in the next 30 days with  "authorizing provider or within the authorizing provider's specialty.  See \"Patient Info\" tab in inbasket, or \"Choose Columns\" in Meds & Orders section of the refill encounter.            famotidine (PEPCID) 40 MG tablet [Pharmacy Med Name: FAMOTIDINE 40 MG TABLET] 30 tablet 1    Last Written Prescription Date:  9-4-18  Last Fill Quantity: 30,  # refills: 1   Last office visit: 7/20/2018 with prescribing provider:  7-20-18   Future Office Visit:     Sig: TAKE 1 TABLET BY MOUTH EVERYDAY AT BEDTIME    H2 Blockers Protocol Passed    10/31/2018  1:53 AM       Passed - Patient is age 12 or older       Passed - Recent (12 mo) or future (30 days) visit within the authorizing provider's specialty    Patient had office visit in the last 12 months or has a visit in the next 30 days with authorizing provider or within the authorizing provider's specialty.  See \"Patient Info\" tab in inbasket, or \"Choose Columns\" in Meds & Orders section of the refill encounter.                "

## 2018-10-31 NOTE — TELEPHONE ENCOUNTER
Routing refill request to provider for review/approval because:  RX Protocol Failed.  Please review refill.  Thank you.  Tenisha Cunningham RN

## 2018-11-01 RX ORDER — ARIPIPRAZOLE 5 MG/1
TABLET ORAL
Qty: 30 TABLET | Refills: 1 | Status: SHIPPED | OUTPATIENT
Start: 2018-11-01 | End: 2018-12-29

## 2018-11-01 RX ORDER — FAMOTIDINE 40 MG/1
TABLET, FILM COATED ORAL
Qty: 30 TABLET | Refills: 1 | Status: SHIPPED | OUTPATIENT
Start: 2018-11-01 | End: 2018-12-29

## 2018-12-29 DIAGNOSIS — K25.9 GASTRIC ULCER WITHOUT HEMORRHAGE OR PERFORATION, UNSPECIFIED CHRONICITY: ICD-10-CM

## 2018-12-29 DIAGNOSIS — F33.1 MAJOR DEPRESSIVE DISORDER, RECURRENT EPISODE, MODERATE (H): ICD-10-CM

## 2018-12-30 DIAGNOSIS — G47.00 PERSISTENT INSOMNIA: ICD-10-CM

## 2018-12-31 RX ORDER — ARIPIPRAZOLE 5 MG/1
TABLET ORAL
Qty: 30 TABLET | Refills: 1 | Status: SHIPPED | OUTPATIENT
Start: 2018-12-31 | End: 2019-02-26

## 2018-12-31 RX ORDER — TRAZODONE HYDROCHLORIDE 100 MG/1
TABLET ORAL
Qty: 90 TABLET | Refills: 1 | Status: SHIPPED | OUTPATIENT
Start: 2018-12-31 | End: 2019-07-02

## 2018-12-31 RX ORDER — FAMOTIDINE 40 MG/1
TABLET, FILM COATED ORAL
Qty: 30 TABLET | Refills: 1 | Status: SHIPPED | OUTPATIENT
Start: 2018-12-31 | End: 2019-05-16

## 2018-12-31 NOTE — TELEPHONE ENCOUNTER
Routing refill request to provider for review/approval because:  Failed protocol: See below.    Jil Berg RN

## 2018-12-31 NOTE — TELEPHONE ENCOUNTER
Routing refill request to provider for review/approval because:  Diagnosis is not on problem list.  Sri Moran RN CPC Triage.

## 2018-12-31 NOTE — TELEPHONE ENCOUNTER
"Requested Prescriptions   Pending Prescriptions Disp Refills     traZODone (DESYREL) 100 MG tablet [Pharmacy Med Name: TRAZODONE 100 MG TABLET] 90 tablet 1    Last Written Prescription Date:  7/3/18  Last Fill Quantity: 90,  # refills: 1   Last office visit: 7/20/2018 with prescribing provider:     Future Office Visit:     Sig: TAKE 1 TABLET BY MOUTH EVERY DAY AT BEDTIME AS NEEDED FOR SLEEP    Serotonin Modulators Passed - 12/30/2018  8:30 AM       Passed - Recent (12 mo) or future (30 days) visit within the authorizing provider's specialty    Patient had office visit in the last 12 months or has a visit in the next 30 days with authorizing provider or within the authorizing provider's specialty.  See \"Patient Info\" tab in inbasket, or \"Choose Columns\" in Meds & Orders section of the refill encounter.             Passed - Patient is age 18 or older       Passed - No active pregnancy on record       Passed - No positive pregnancy test in past 12 months          "

## 2018-12-31 NOTE — TELEPHONE ENCOUNTER
"Requested Prescriptions   Pending Prescriptions Disp Refills     famotidine (PEPCID) 40 MG tablet [Pharmacy Med Name: FAMOTIDINE 40 MG TABLET] 30 tablet 1    Last Written Prescription Date:  11/1/18  Last Fill Quantity: 30,  # refills: 1   Last office visit: 7/20/2018 with prescribing provider:     Future Office Visit:     Sig: TAKE 1 TABLET BY MOUTH EVERYDAY AT BEDTIME    H2 Blockers Protocol Passed - 12/29/2018  8:38 AM       Passed - Patient is age 12 or older       Passed - Recent (12 mo) or future (30 days) visit within the authorizing provider's specialty    Patient had office visit in the last 12 months or has a visit in the next 30 days with authorizing provider or within the authorizing provider's specialty.  See \"Patient Info\" tab in inbasket, or \"Choose Columns\" in Meds & Orders section of the refill encounter.              ARIPiprazole (ABILIFY) 5 MG tablet [Pharmacy Med Name: ARIPIPRAZOLE 5 MG TABLET] 30 tablet 1    Last Written Prescription Date:  11/1/18  Last Fill Quantity: 30,  # refills: 1   Last office visit: 7/20/2018 with prescribing provider:     Future Office Visit:     Sig: TAKE 1 TABLET (5 MG) BY MOUTH AT BEDTIME    Antipsychotic Medications Failed - 12/29/2018  8:38 AM       Failed - CBC on file in past 12 months    Recent Labs   Lab Test 02/28/17  1122   WBC 3.8*   RBC 4.00   HGB 11.9   HCT 35.5                   Passed - Blood pressure under 140/90 in past 12 months    BP Readings from Last 3 Encounters:   07/20/18 134/83   03/15/18 137/71   09/18/17 139/85                Passed - Patient is 12 years of age or older       Passed - Lipid panel on file within the past 12 months    Recent Labs   Lab Test 07/20/18  1442 07/03/12  1135   CHOL 194 179   TRIG 67 104   HDL 78 52   * 106   NHDL 116  --    VLDL  --  21   CHOLHDLRATIO  --  3.4              Passed - Heart Rate on file within past 12 months    Pulse Readings from Last 3 Encounters:   07/20/18 71   03/15/18 83   09/18/17 " "93              Passed - A1c or Glucose on file in past 12 months    Recent Labs   Lab Test 07/20/18  1442   GLC 87       Please review patients last 3 weights. If a weight gain of >10 lbs exists, you may refill the prescription once after instructing the patient to schedule an appointment within the next 30 days.    Wt Readings from Last 3 Encounters:   07/20/18 44.5 kg (98 lb)   03/15/18 46.7 kg (103 lb)   09/18/17 47.6 kg (105 lb)            Passed - Patient is not pregnant       Passed - No positve pregnancy test on file in past 12 months       Passed - Recent (6 mo) or future (30 days) visit within the authorizing provider's specialty    Patient had office visit in the last 6 months or has a visit in the next 30 days with authorizing provider or within the authorizing provider's specialty.  See \"Patient Info\" tab in inbasket, or \"Choose Columns\" in Meds & Orders section of the refill encounter.              "

## 2019-01-25 ENCOUNTER — TELEPHONE (OUTPATIENT)
Dept: FAMILY MEDICINE | Facility: CLINIC | Age: 59
End: 2019-01-25

## 2019-01-25 DIAGNOSIS — R09.81 NASAL CONGESTION: Primary | ICD-10-CM

## 2019-01-25 RX ORDER — PSEUDOEPHEDRINE HCL 120 MG/1
120 TABLET, FILM COATED, EXTENDED RELEASE ORAL EVERY 12 HOURS
Qty: 20 TABLET | Refills: 0 | Status: SHIPPED | OUTPATIENT
Start: 2019-01-25 | End: 2019-02-04

## 2019-01-25 NOTE — TELEPHONE ENCOUNTER
Reason for Call:  Other     Detailed comments: would like to get a verbal refill of the medication sudafed 120 mg 24 hr please call pharmacy     Phone Number Patient can be reached at: Other phone number:      Best Time: any    Can we leave a detailed message on this number? YES    Call taken on 1/25/2019 at 3:08 PM by Celi Real

## 2019-01-25 NOTE — TELEPHONE ENCOUNTER
Routing refill request to provider for review/approval because:  No associated DX  Last OV 7/20/18.    Sri Moran RN CPC Triage.

## 2019-01-30 DIAGNOSIS — M12.532 TRAUMATIC ARTHRITIS OF LEFT WRIST: ICD-10-CM

## 2019-01-30 RX ORDER — TRAMADOL HYDROCHLORIDE 50 MG/1
50 TABLET ORAL EVERY 6 HOURS PRN
Qty: 90 TABLET | Refills: 0 | Status: SHIPPED | OUTPATIENT
Start: 2019-01-30 | End: 2019-02-19

## 2019-01-30 NOTE — TELEPHONE ENCOUNTER
Requested Prescriptions   Pending Prescriptions Disp Refills     traMADol (ULTRAM) 50 MG tablet [Pharmacy Med Name: TRAMADOL HCL 50 MG TABLET] 120 tablet      Sig: TAKE 1 TABLET BY MOUTH EVERY 6 HOURS AS NEEDED, MAX 4 TABS A DAY    There is no refill protocol information for this order         Last Written Prescription Date:  8/8/18  Last Fill Quantity: 120,   # refills: 5  Last Office Visit: 7/20/18  Future Office visit:       Routing refill request to provider for review/approval because:  Drug not on the [facility], [facility] or [facility] refill protocol or controlled substance

## 2019-02-04 ENCOUNTER — TELEPHONE (OUTPATIENT)
Dept: FAMILY MEDICINE | Facility: CLINIC | Age: 59
End: 2019-02-04

## 2019-02-04 DIAGNOSIS — R09.81 NASAL CONGESTION: ICD-10-CM

## 2019-02-04 RX ORDER — PSEUDOEPHEDRINE HCL 120 MG/1
120 TABLET, FILM COATED, EXTENDED RELEASE ORAL EVERY 12 HOURS
Qty: 20 TABLET | Refills: 0 | Status: SHIPPED | OUTPATIENT
Start: 2019-02-04 | End: 2019-02-11

## 2019-02-04 NOTE — TELEPHONE ENCOUNTER
Dino Wright MD   Cp Team 2 Just now (12:02 PM)      A written rx writing to her, she should had a hard copy   thanks (Routing comment)

## 2019-02-04 NOTE — TELEPHONE ENCOUNTER
Reason for Call:  Medication or medication refill:    Do you use a [facility]?  Name of the pharmacy and phone number for the current request:  [facility] [address]/ [place]/ [address]. [phone] - patient did not know the address.    Name of the medication requested: pseudoePHEDrine (SUDAFED) 120 MG 12 hr tablet    Other request:  Patient usually has all her medications go to [facility] where this was sent to but she would like this script to be sent to [facility] instead.  It will be just this script that will go to [facility].    Can we leave a detailed message on this number? YES    Phone number patient can be reached at: Home number on file [phone] (home)    Best Time: any    Call taken on 2/4/2019 at 11:34 AM by Tennille Phillips

## 2019-02-04 NOTE — TELEPHONE ENCOUNTER
Unable to transfer over to [facility].    Needs new script sent to [facility] pharmacy.    RN unable to transfer this script.      Sri Moran RN CPC Triage.

## 2019-02-08 ENCOUNTER — TELEPHONE (OUTPATIENT)
Dept: FAMILY MEDICINE | Facility: CLINIC | Age: 59
End: 2019-02-08

## 2019-02-08 DIAGNOSIS — R09.81 NASAL CONGESTION: ICD-10-CM

## 2019-02-08 RX ORDER — PSEUDOEPHEDRINE HCL 120 MG/1
120 TABLET, FILM COATED, EXTENDED RELEASE ORAL EVERY 12 HOURS
Qty: 180 TABLET | Refills: 3 | Status: CANCELLED | OUTPATIENT
Start: 2019-02-08

## 2019-02-08 NOTE — TELEPHONE ENCOUNTER
Reason for Call:  Other / Medication (pseudoePHEDrine (SUDAFED)    Detailed comments: Patient called and stated she only got one pack of pseudoePHEDrine (SUDAFED), when she is supposed to receive three packs per month.  Please call patient back to discuss.    Phone Number Patient can be reached at: Cell number on file:    Telephone Information:   Mobile [phone]       Best Time: ASAP    Can we leave a detailed message on this number? YES    Call taken on 2/8/2019 at 2:13 PM by Aditi Palma

## 2019-02-08 NOTE — TELEPHONE ENCOUNTER
Patient is asking for a 90 day supply rather than 30 day.  Please advise.  Thank you.  Tenisha Cunningham RN

## 2019-02-11 DIAGNOSIS — R09.81 NASAL CONGESTION: ICD-10-CM

## 2019-02-11 RX ORDER — PSEUDOEPHEDRINE HCL 120 MG/1
120 TABLET, FILM COATED, EXTENDED RELEASE ORAL EVERY 12 HOURS
Qty: 60 TABLET | Refills: 0 | Status: SHIPPED | OUTPATIENT
Start: 2019-02-11 | End: 2019-03-21

## 2019-02-12 ENCOUNTER — TELEPHONE (OUTPATIENT)
Dept: FAMILY MEDICINE | Facility: CLINIC | Age: 59
End: 2019-02-12

## 2019-02-12 DIAGNOSIS — R09.81 NASAL CONGESTION: ICD-10-CM

## 2019-02-12 RX ORDER — PSEUDOEPHEDRINE HCL 120 MG/1
120 TABLET, FILM COATED, EXTENDED RELEASE ORAL EVERY 12 HOURS
Qty: 60 TABLET | Refills: 0 | Status: CANCELLED | OUTPATIENT
Start: 2019-02-12

## 2019-02-12 NOTE — TELEPHONE ENCOUNTER
I removed order, flagged for TC to let patient know Rx faxed.    Eleanor Mckee RN  [facility]

## 2019-02-19 DIAGNOSIS — K25.9 GASTRIC ULCER WITHOUT HEMORRHAGE OR PERFORATION, UNSPECIFIED CHRONICITY: ICD-10-CM

## 2019-02-19 DIAGNOSIS — F33.1 MAJOR DEPRESSIVE DISORDER, RECURRENT EPISODE, MODERATE (H): ICD-10-CM

## 2019-02-19 DIAGNOSIS — M12.532 TRAUMATIC ARTHRITIS OF LEFT WRIST: ICD-10-CM

## 2019-02-19 NOTE — TELEPHONE ENCOUNTER
"Requested Prescriptions   Pending Prescriptions Disp Refills     FLUoxetine (PROZAC) 40 MG capsule [Pharmacy Med Name: FLUOXETINE HCL 40 MG CAPSULE] 60 capsule 0    Last Written Prescription Date:  8/10/18  Last Fill Quantity: 270,  # refills: 3   Last office visit: 7/20/2018 with prescribing provider:     Future Office Visit:     Sig: TAKE 2 CAPSULES (80 MG) BY MOUTH DAILY    SSRIs Protocol Failed - 2/19/2019 12:21 PM       Failed - PHQ-9 score less than 5 in past 6 months    Please review last PHQ-9 score.          Failed - Recent (6 mo) or future (30 days) visit within the authorizing provider's specialty    Patient had office visit in the last 6 months or has a visit in the next 30 days with authorizing provider or within the authorizing provider's specialty.  See \"Patient Info\" tab in inbasket, or \"Choose Columns\" in Meds & Orders section of the refill encounter.           Passed - Medication is active on med list       Passed - Patient is age 18 or older       Passed - No active pregnancy on record       Passed - No positive pregnancy test in last 12 months        famotidine (PEPCID) 40 MG tablet [Pharmacy Med Name: FAMOTIDINE 40 MG TABLET] 30 tablet 1    Last Written Prescription Date:  12/31/18  Last Fill Quantity: 30,  # refills: 1   Last office visit: 7/20/2018 with prescribing provider:     Future Office Visit:     Sig: TAKE 1 TABLET BY MOUTH EVERYDAY AT BEDTIME    H2 Blockers Protocol Passed - 2/19/2019 12:21 PM       Passed - Patient is age 12 or older       Passed - Recent (12 mo) or future (30 days) visit within the authorizing provider's specialty    Patient had office visit in the last 12 months or has a visit in the next 30 days with authorizing provider or within the authorizing provider's specialty.  See \"Patient Info\" tab in inbasket, or \"Choose Columns\" in Meds & Orders section of the refill encounter.             Passed - Medication is active on med list        traMADol (ULTRAM) 50 MG tablet " [Pharmacy Med Name: TRAMADOL HCL 50 MG TABLET] 120 tablet      Sig: TAKE 1 TABLET BY MOUTH EVERY 6 HOURS AS NEEDED, MAX 4 TABS A DAY    There is no refill protocol information for this order         Last Written Prescription Date:  1/30/19  Last Fill Quantity: 90,   # refills: 0  Last Office Visit: 7/20/18  Future Office visit:       Routing refill request to provider for review/approval because:  Drug not on the [facility], P or [facility] refill protocol or controlled substance

## 2019-02-21 RX ORDER — TRAMADOL HYDROCHLORIDE 50 MG/1
TABLET ORAL
Qty: 120 TABLET | Refills: 0 | Status: SHIPPED | OUTPATIENT
Start: 2019-02-21 | End: 2019-03-22

## 2019-02-21 RX ORDER — FLUOXETINE 40 MG/1
80 CAPSULE ORAL DAILY
Qty: 60 CAPSULE | Refills: 0 | Status: SHIPPED | OUTPATIENT
Start: 2019-02-21 | End: 2019-03-25

## 2019-02-21 RX ORDER — FAMOTIDINE 40 MG/1
TABLET, FILM COATED ORAL
Qty: 30 TABLET | Refills: 1 | Status: SHIPPED | OUTPATIENT
Start: 2019-02-21 | End: 2019-04-13

## 2019-02-25 ENCOUNTER — TELEPHONE (OUTPATIENT)
Dept: FAMILY MEDICINE | Facility: CLINIC | Age: 59
End: 2019-02-25

## 2019-02-25 NOTE — TELEPHONE ENCOUNTER
Routed to PCP.  In med list patient has 2 different doses.  Take 3 tabs a day, and take 2 tabs a day.    Sri Moran RN CPC Triage.

## 2019-02-25 NOTE — TELEPHONE ENCOUNTER
Reason for Call:  Other prescription    Detailed comments: Pt is wanting to speak with a RN about FLUoxetine (PROZAC) 40 MG capsule. She had just picked up the prescription and was confused about the dosing. Please call pt to discuss.     Phone Number Patient can be reached at: Home number on file [phone] (home)    Best Time: Anytime    Can we leave a detailed message on this number? YES    Call taken on 2/25/2019 at 12:11 PM by Brandee Loya

## 2019-02-25 NOTE — TELEPHONE ENCOUNTER
Notified patient of the message below per PCP.  Patient stated understanding and agreeable with the plan of care. Sri Moran, RN-BSN, [facility] Triage.

## 2019-02-26 DIAGNOSIS — F33.1 MAJOR DEPRESSIVE DISORDER, RECURRENT EPISODE, MODERATE (H): ICD-10-CM

## 2019-02-26 NOTE — TELEPHONE ENCOUNTER
"Requested Prescriptions   Pending Prescriptions Disp Refills     ARIPiprazole (ABILIFY) 5 MG tablet    Last Written Prescription Date:  12/31/18  Last Fill Quantity: 30,  # refills: 1   Last office visit: 7/20/2018 with prescribing provider:     Future Office Visit:     30 tablet 1    Antipsychotic Medications Failed - 2/26/2019  9:05 AM       Failed - CBC on file in past 12 months    Recent Labs   Lab Test 02/28/17  1122   WBC 3.8*   RBC 4.00   HGB 11.9   HCT 35.5                   Failed - Recent (6 mo) or future (30 days) visit within the authorizing provider's specialty    Patient had office visit in the last 6 months or has a visit in the next 30 days with authorizing provider or within the authorizing provider's specialty.  See \"Patient Info\" tab in inbasket, or \"Choose Columns\" in Meds & Orders section of the refill encounter.           Passed - Blood pressure under 140/90 in past 12 months    BP Readings from Last 3 Encounters:   07/20/18 134/83   03/15/18 137/71   09/18/17 139/85                Passed - Patient is 12 years of age or older       Passed - Lipid panel on file within the past 12 months    Recent Labs   Lab Test 07/20/18  1442 07/03/12  1135   CHOL 194 179   TRIG 67 104   HDL 78 52   * 106   NHDL 116  --    VLDL  --  21   CHOLHDLRATIO  --  3.4              Passed - Heart Rate on file within past 12 months    Pulse Readings from Last 3 Encounters:   07/20/18 71   03/15/18 83   09/18/17 93              Passed - A1c or Glucose on file in past 12 months    Recent Labs   Lab Test 07/20/18  1442   GLC 87       Please review patients last 3 weights. If a weight gain of >10 lbs exists, you may refill the prescription once after instructing the patient to schedule an appointment within the next 30 days.    Wt Readings from Last 3 Encounters:   07/20/18 44.5 kg (98 lb)   03/15/18 46.7 kg (103 lb)   09/18/17 47.6 kg (105 lb)            Passed - Medication is active on med list       Passed - " Patient is not pregnant       Passed - No positve pregnancy test on file in past 12 months

## 2019-02-27 RX ORDER — ARIPIPRAZOLE 5 MG/1
TABLET ORAL
Qty: 30 TABLET | Refills: 0 | Status: SHIPPED | OUTPATIENT
Start: 2019-02-27 | End: 2019-03-26

## 2019-03-01 ENCOUNTER — TELEPHONE (OUTPATIENT)
Dept: FAMILY MEDICINE | Facility: CLINIC | Age: 59
End: 2019-03-01

## 2019-03-01 DIAGNOSIS — Z12.31 VISIT FOR SCREENING MAMMOGRAM: Primary | ICD-10-CM

## 2019-03-01 NOTE — TELEPHONE ENCOUNTER
Panel Management Review      Patient has the following on her problem list:     Depression / Dysthymia review    Measure:  Needs PHQ-9 score of 4 or less during index window.  Administer PHQ-9 and if score is 5 or more, send encounter to provider for next steps.    5 - 7 month window range:     PHQ-9 SCORE 9/1/2017 9/1/2017 9/18/2017   PHQ-9 Total Score - - -   PHQ-9 Total Score 23 24 16       If PHQ-9 recheck is 5 or more, route to provider for next steps.    Patient is due for:  PHQ9      Composite cancer screening  Chart review shows that this patient is due/due soon for the following Mammogram  Summary:    Patient is due/failing the following:   MAMMOGRAM and PHQ9    Action needed:   Patient needs referral/order: Mammogram    Type of outreach:    Sent letter.    Questions for provider review:    None                                                                                                                                    Lore Sharma MA       Chart routed to Care Team .

## 2019-03-01 NOTE — LETTER
March 1, 2019    [place]ia Flanagan  [address] [place] [address]    Dear Nataliia    We care about your health and have reviewed your health plan. We have reviewed your medical conditions, medication list, and lab results and are making recommendations based on this review, to better manage your health.    You are in particular need of attention regarding:  - Your Depression  - Scheduling a Breast Cancer Screening (Mammography) [phone]      Here is a list of Health Maintenance topics that are due now or due soon:  Health Maintenance Due   Topic Date Due     DTAP/TDAP/TD IMMUNIZATION (1 - Tdap) 11/22/1985     MAMMO SCREEN Q2 YR (SYSTEM ASSIGNED)  11/22/2000     ZOSTER IMMUNIZATION (1 of 2) 11/22/2010     ADVANCE DIRECTIVE PLANNING Q5 YRS  11/22/2015     PHQ-9 Q6 MONTHS  03/18/2018     INFLUENZA VACCINE (1) 09/01/2018     We will be calling you in the next couple of weeks to help you schedule any appointments that are needed.  Please call us at [phone] (or use [employer]) to address the above recommendations.     Thank you for trusting [facility] and we appreciate the opportunity to serve you.  We look forward to supporting your healthcare needs in the future.    Healthy Regards,    Your Care Team

## 2019-03-21 DIAGNOSIS — R09.81 NASAL CONGESTION: ICD-10-CM

## 2019-03-21 RX ORDER — PSEUDOEPHEDRINE HCL 120 MG/1
120 TABLET, FILM COATED, EXTENDED RELEASE ORAL EVERY 12 HOURS
Qty: 60 TABLET | Refills: 0 | Status: SHIPPED | OUTPATIENT
Start: 2019-03-21 | End: 2019-05-16

## 2019-03-21 NOTE — TELEPHONE ENCOUNTER
Requested Prescriptions   Pending Prescriptions Disp Refills     pseudoePHEDrine (SUDAFED) 120 MG 12 hr tablet 60 tablet 0     Sig: Take 1 tablet (120 mg) by mouth every 12 hours    There is no refill protocol information for this order   Last Written Prescription Date:  2-11-19  Last Fill Quantity: 60,  # refills: 0   Last office visit: 7/20/2018 with prescribing provider:  7-20-18   Future Office Visit:

## 2019-03-22 DIAGNOSIS — F33.1 MAJOR DEPRESSIVE DISORDER, RECURRENT EPISODE, MODERATE (H): ICD-10-CM

## 2019-03-22 DIAGNOSIS — M12.532 TRAUMATIC ARTHRITIS OF LEFT WRIST: ICD-10-CM

## 2019-03-25 RX ORDER — FLUOXETINE 40 MG/1
80 CAPSULE ORAL DAILY
Qty: 60 CAPSULE | Refills: 0 | OUTPATIENT
Start: 2019-03-25

## 2019-03-25 NOTE — TELEPHONE ENCOUNTER
"2 entries for prozac on Epic, 60 mg and 80 mg.   The 80 mg is more recent, one month sent 2/21/19.  The 60 mg (3 of the 20 mg tabs daily) was sent for a year on 8/10/18.    I see the 80 mg dosing (2 of the 40 mg capsules daily) one month supply was faxed to [facility] on 2/21/19.    Patient was last seen 7/20/18.  No follow up plan noted.    Attempted to call patient at home/mobile number, left message on voicemail; patient was instructed to return call to [facility] RN directly on the RN call back line at [phone]   Need to clarify the prozac dosing.        After leaving message I see note 2/25/19 in which patient was questioning her dose (was increased at last refill).   Provider advised \"40 mg daily\" is her dose but the Rx sent is actually 80 mg daily.  Routed to Dr. Engelmann to address, please remove the 60 mg daily from Epic med list if 80 mg daily is the intended dose.    Also address tramadol refill.   Not on RN refill protocol.    Eleanor Mckee, RN  [facility]        "

## 2019-03-25 NOTE — TELEPHONE ENCOUNTER
"Patient returned call to RN line and left message for call back to her.  I called her back, she says she is supposed to be on 40 mg daily (take 2 of the 20 mg tabs daily).   I removed the 40 mg dosing from Epic for clarity and \"Refusal\" routed back to pharmacy with note.      PHQ-9 score:    PHQ-9 SCORE 2017   PHQ-9 Total Score -   PHQ-9 Total Score 16       Offered to do phone PHQ9, patient states will be elevated as her mom just , denies any suicidal ideation and states is \"doing as well as can be expected\".    I cued up the correct prozac dosing and routed to Dr. Engelmann to address tramadol and prozac refills.        Eleanor Mckee RN  [facility]    "

## 2019-03-25 NOTE — TELEPHONE ENCOUNTER
"Requested Prescriptions   Pending Prescriptions Disp Refills     traMADol (ULTRAM) 50 MG tablet [Pharmacy Med Name: TRAMADOL HCL 50 MG TABLET] 120 tablet 0     Sig: TAKE 1 TAB BY MOUTH EVERY 6 HOURS AS NEEDED.MAX 4 TAB A DAY    There is no refill protocol information for this order         Last Written Prescription Date:  2/21/19  Last Fill Quantity: 120,   # refills: 0  Last Office Visit: 7/20/19  Future Office visit:       Routing refill request to provider for review/approval because:  Drug not on the [facility], [facility] or [facility] refill protocol or controlled substance       FLUoxetine (PROZAC) 40 MG capsule [Pharmacy Med Name: FLUOXETINE HCL 40 MG CAPSULE] 60 capsule 0    Last Written Prescription Date:  2/21/19  Last Fill Quantity: 60,  # refills: 0   Last office visit: 7/20/2018 with prescribing provider:     Future Office Visit:     Sig: TAKE 2 CAPSULES (80 MG) BY MOUTH DAILY    SSRIs Protocol Failed - 3/22/2019  5:14 PM       Failed - PHQ-9 score less than 5 in past 6 months    Please review last PHQ-9 score.          Failed - Recent (6 mo) or future (30 days) visit within the authorizing provider's specialty    Patient had office visit in the last 6 months or has a visit in the next 30 days with authorizing provider or within the authorizing provider's specialty.  See \"Patient Info\" tab in inbasket, or \"Choose Columns\" in Meds & Orders section of the refill encounter.           Passed - Medication is active on med list       Passed - Patient is age 18 or older       Passed - No active pregnancy on record       Passed - No positive pregnancy test in last 12 months          "

## 2019-03-26 DIAGNOSIS — F33.1 MAJOR DEPRESSIVE DISORDER, RECURRENT EPISODE, MODERATE (H): ICD-10-CM

## 2019-03-26 RX ORDER — ARIPIPRAZOLE 5 MG/1
TABLET ORAL
Qty: 90 TABLET | Refills: 1 | Status: SHIPPED | OUTPATIENT
Start: 2019-03-26 | End: 2019-09-23

## 2019-03-26 NOTE — TELEPHONE ENCOUNTER
"Requested Prescriptions   Pending Prescriptions Disp Refills     ARIPiprazole (ABILIFY) 5 MG tablet 30 tablet 0    Last Written Prescription Date:  2-27-19  Last Fill Quantity: 30,  # refills: 0   Last office visit: 7/20/2018 with prescribing provider:  7-20-18   Future Office Visit:     Sig: TAKE 1 TABLET (5 MG) BY MOUTH AT BEDTIME    Antipsychotic Medications Failed - 3/26/2019  2:37 PM       Failed - CBC on file in past 12 months    Recent Labs   Lab Test 02/28/17  1122   WBC 3.8*   RBC 4.00   HGB 11.9   HCT 35.5                   Failed - Recent (6 mo) or future (30 days) visit within the authorizing provider's specialty    Patient had office visit in the last 6 months or has a visit in the next 30 days with authorizing provider or within the authorizing provider's specialty.  See \"Patient Info\" tab in inbasket, or \"Choose Columns\" in Meds & Orders section of the refill encounter.           Passed - Blood pressure under 140/90 in past 12 months    BP Readings from Last 3 Encounters:   07/20/18 134/83   03/15/18 137/71   09/18/17 139/85                Passed - Patient is 12 years of age or older       Passed - Lipid panel on file within the past 12 months    Recent Labs   Lab Test 07/20/18  1442 07/03/12  1135   CHOL 194 179   TRIG 67 104   HDL 78 52   * 106   NHDL 116  --    VLDL  --  21   CHOLHDLRATIO  --  3.4              Passed - Heart Rate on file within past 12 months    Pulse Readings from Last 3 Encounters:   07/20/18 71   03/15/18 83   09/18/17 93              Passed - A1c or Glucose on file in past 12 months    Recent Labs   Lab Test 07/20/18  1442   GLC 87       Please review patients last 3 weights. If a weight gain of >10 lbs exists, you may refill the prescription once after instructing the patient to schedule an appointment within the next 30 days.    Wt Readings from Last 3 Encounters:   07/20/18 44.5 kg (98 lb)   03/15/18 46.7 kg (103 lb)   09/18/17 47.6 kg (105 lb)            " Passed - Medication is active on med list       Passed - Patient is not pregnant       Passed - No positve pregnancy test on file in past 12 months

## 2019-03-27 RX ORDER — FLUOXETINE 40 MG/1
40 CAPSULE ORAL DAILY
Qty: 30 CAPSULE | Refills: 0 | Status: SHIPPED | OUTPATIENT
Start: 2019-03-27 | End: 2019-04-05

## 2019-03-27 RX ORDER — TRAMADOL HYDROCHLORIDE 50 MG/1
TABLET ORAL
Qty: 120 TABLET | Refills: 0 | Status: SHIPPED | OUTPATIENT
Start: 2019-03-27 | End: 2019-04-26

## 2019-04-04 DIAGNOSIS — F33.1 MAJOR DEPRESSIVE DISORDER, RECURRENT EPISODE, MODERATE (H): ICD-10-CM

## 2019-04-04 NOTE — TELEPHONE ENCOUNTER
"Requested Prescriptions   Pending Prescriptions Disp Refills     FLUoxetine (PROZAC) 40 MG capsule 30 capsule 0    Last Written Prescription Date:  3-27-19  Last Fill Quantity: 30,  # refills: 0   Last office visit: 7/20/2018 with prescribing provider:  7-20-18   Future Office Visit:     Sig: Take 1 capsule (40 mg) by mouth daily    SSRIs Protocol Failed - 4/4/2019  7:42 AM       Failed - PHQ-9 score less than 5 in past 6 months    Please review last PHQ-9 score.          Failed - Recent (6 mo) or future (30 days) visit within the authorizing provider's specialty    Patient had office visit in the last 6 months or has a visit in the next 30 days with authorizing provider or within the authorizing provider's specialty.  See \"Patient Info\" tab in inbasket, or \"Choose Columns\" in Meds & Orders section of the refill encounter.           Passed - Medication is active on med list       Passed - Patient is age 18 or older       Passed - No active pregnancy on record       Passed - No positive pregnancy test in last 12 months          "

## 2019-04-04 NOTE — TELEPHONE ENCOUNTER
PHQ-9 score:    PHQ-9 SCORE 9/18/2017   PHQ-9 Total Score -   PHQ-9 Total Score 16             Routing refill request to provider for review/approval because:  Coleen given x1 and patient did not follow up, please advise    Eleanor Mckee, SHERRELL  [facility]

## 2019-04-05 RX ORDER — FLUOXETINE 40 MG/1
40 CAPSULE ORAL DAILY
Qty: 30 CAPSULE | Refills: 0 | Status: SHIPPED | OUTPATIENT
Start: 2019-04-05 | End: 2019-05-16

## 2019-04-13 DIAGNOSIS — K25.9 GASTRIC ULCER WITHOUT HEMORRHAGE OR PERFORATION, UNSPECIFIED CHRONICITY: ICD-10-CM

## 2019-04-15 RX ORDER — FAMOTIDINE 40 MG/1
TABLET, FILM COATED ORAL
Qty: 30 TABLET | Refills: 1 | Status: SHIPPED | OUTPATIENT
Start: 2019-04-15 | End: 2019-05-11

## 2019-04-15 NOTE — TELEPHONE ENCOUNTER
Prescription approved per [facility] Refill Protocol.  Sri Moran, RN CPC Triage.

## 2019-04-15 NOTE — TELEPHONE ENCOUNTER
"Requested Prescriptions   Pending Prescriptions Disp Refills     famotidine (PEPCID) 40 MG tablet [Pharmacy Med Name: FAMOTIDINE 40 MG TABLET] 30 tablet 1     Sig: TAKE 1 TABLET BY MOUTH EVERYDAY AT BEDTIME   Last Written Prescription Date:  2/21/19  Last Fill Quantity: 30,  # refills: 1   Last office visit: 7/20/2018 with prescribing provider:     Future Office Visit:        H2 Blockers Protocol Passed - 4/13/2019 11:32 AM        Passed - Patient is age 12 or older        Passed - Recent (12 mo) or future (30 days) visit within the authorizing provider's specialty     Patient had office visit in the last 12 months or has a visit in the next 30 days with authorizing provider or within the authorizing provider's specialty.  See \"Patient Info\" tab in inbasket, or \"Choose Columns\" in Meds & Orders section of the refill encounter.              Passed - Medication is active on med list          "

## 2019-04-26 DIAGNOSIS — M12.532 TRAUMATIC ARTHRITIS OF LEFT WRIST: ICD-10-CM

## 2019-04-26 NOTE — TELEPHONE ENCOUNTER
Requested Prescriptions   Pending Prescriptions Disp Refills     traMADol (ULTRAM) 50 MG tablet [Pharmacy Med Name: TRAMADOL HCL 50 MG TABLET] 120 tablet 0     Sig: TAKE 1 TABLET BY MOUTH EVERY 6 HOURS AS NEEDED       There is no refill protocol information for this order         Last Written Prescription Date:  3/27/19  Last Fill Quantity: 120,   # refills: 0  Last Office Visit: 7/20/18  Future Office visit:       Routing refill request to provider for review/approval because:  Drug not on the [facility], [facility] or [facility] refill protocol or controlled substance

## 2019-04-29 RX ORDER — TRAMADOL HYDROCHLORIDE 50 MG/1
TABLET ORAL
Qty: 120 TABLET | Refills: 0 | Status: SHIPPED | OUTPATIENT
Start: 2019-04-29 | End: 2019-05-31

## 2019-05-04 DIAGNOSIS — J30.89 CHRONIC NON-SEASONAL ALLERGIC RHINITIS: ICD-10-CM

## 2019-05-06 NOTE — TELEPHONE ENCOUNTER
Requested Prescriptions   Pending Prescriptions Disp Refills     EQL 12 HOUR DECONGESTANT 120 MG 12 hr tablet [Pharmacy Med Name: EQL 12 Hour Decongestant Oral Tablet Extended Release 12 Hour 120 MG] 60 tablet 0     Sig: TAKE ONE TABLET BY MOUTH EVERY TWELVE HOURS       There is no refill protocol information for this order         Last Written Prescription Date:  3/21/19  Last Fill Quantity: 90,   # refills: 0  Last Office Visit: 7/20/18  Future Office visit:       Routing refill request to provider for review/approval because:  Drug not on the [facility], [facility] or [facility] refill protocol or controlled substance

## 2019-05-11 DIAGNOSIS — K25.9 GASTRIC ULCER WITHOUT HEMORRHAGE OR PERFORATION, UNSPECIFIED CHRONICITY: ICD-10-CM

## 2019-05-13 NOTE — TELEPHONE ENCOUNTER
"Requested Prescriptions   Pending Prescriptions Disp Refills     famotidine (PEPCID) 40 MG tablet [Pharmacy Med Name: FAMOTIDINE 40 MG TABLET] 30 tablet 0     Sig: TAKE 1 TABLET BY MOUTH EVERYDAY AT BEDTIME   Last Written Prescription Date:  12/31/18  Last Fill Quantity: 30,  # refills: 1   Last office visit: 7/20/2018 with prescribing provider:     Future Office Visit:        H2 Blockers Protocol Passed - 5/11/2019  9:38 AM        Passed - Patient is age 12 or older        Passed - Recent (12 mo) or future (30 days) visit within the authorizing provider's specialty     Patient had office visit in the last 12 months or has a visit in the next 30 days with authorizing provider or within the authorizing provider's specialty.  See \"Patient Info\" tab in inbasket, or \"Choose Columns\" in Meds & Orders section of the refill encounter.              Passed - Medication is active on med list          "

## 2019-05-14 RX ORDER — FAMOTIDINE 40 MG/1
TABLET, FILM COATED ORAL
Qty: 30 TABLET | Refills: 0 | Status: SHIPPED | OUTPATIENT
Start: 2019-05-14 | End: 2019-05-16

## 2019-05-14 NOTE — TELEPHONE ENCOUNTER
Prescription approved per [facility] Refill Protocol.    Albania Hollis RN  [facility]

## 2019-05-16 ENCOUNTER — OFFICE VISIT (OUTPATIENT)
Dept: FAMILY MEDICINE | Facility: CLINIC | Age: 59
End: 2019-05-16
Payer: COMMERCIAL

## 2019-05-16 VITALS
TEMPERATURE: 97.2 F | WEIGHT: 105 LBS | DIASTOLIC BLOOD PRESSURE: 66 MMHG | SYSTOLIC BLOOD PRESSURE: 126 MMHG | BODY MASS INDEX: 18.45 KG/M2 | OXYGEN SATURATION: 98 %

## 2019-05-16 DIAGNOSIS — J30.89 CHRONIC NON-SEASONAL ALLERGIC RHINITIS: ICD-10-CM

## 2019-05-16 DIAGNOSIS — K25.9 GASTRIC ULCER WITHOUT HEMORRHAGE OR PERFORATION, UNSPECIFIED CHRONICITY: ICD-10-CM

## 2019-05-16 DIAGNOSIS — Z12.31 ENCOUNTER FOR SCREENING MAMMOGRAM FOR BREAST CANCER: Primary | ICD-10-CM

## 2019-05-16 DIAGNOSIS — F33.1 MAJOR DEPRESSIVE DISORDER, RECURRENT EPISODE, MODERATE (H): ICD-10-CM

## 2019-05-16 PROCEDURE — 99214 OFFICE O/P EST MOD 30 MIN: CPT | Performed by: NURSE PRACTITIONER

## 2019-05-16 RX ORDER — FLUOXETINE 40 MG/1
40 CAPSULE ORAL DAILY
Qty: 90 CAPSULE | Refills: 1 | Status: SHIPPED | OUTPATIENT
Start: 2019-05-16 | End: 2019-07-15

## 2019-05-16 RX ORDER — PSEUDOEPHEDRINE HCL 120 MG/1
TABLET, FILM COATED, EXTENDED RELEASE ORAL
Qty: 60 TABLET | Refills: 3 | Status: SHIPPED | OUTPATIENT
Start: 2019-05-16 | End: 2019-09-24

## 2019-05-16 RX ORDER — FAMOTIDINE 40 MG/1
TABLET, FILM COATED ORAL
Qty: 90 TABLET | Refills: 3 | Status: SHIPPED | OUTPATIENT
Start: 2019-05-16 | End: 2020-04-13

## 2019-05-16 ASSESSMENT — PATIENT HEALTH QUESTIONNAIRE - PHQ9: SUM OF ALL RESPONSES TO PHQ QUESTIONS 1-9: 5

## 2019-05-16 ASSESSMENT — PAIN SCALES - GENERAL: PAINLEVEL: NO PAIN (0)

## 2019-05-16 NOTE — PATIENT INSTRUCTIONS
Try taking 1/2 tablet (5 mg) Zyrtec in the morning and then see if this allows you to reduce your use of the pseudoephedrine

## 2019-05-16 NOTE — PROGRESS NOTES
rh  SUBJECTIVE:   Nataliia Flanagan is a 58 year old female who presents to clinic today for the following   health issues:    She takes pseudoephedrine BID for rhinorrhea  Has been on since 2015  Tried antihistamines, nasal sprays  Tried taking once daily and nose was running profusely  States she saw ENT for rhinorrhea, this was before starting this medication  Does not have those records    She has several environmental allergies  Can't tolerate over the counter antihistamines. Zyrtec, Claritin, Allegra caused drowsiness  Tried Flonase, NasaChrom  Phenylephrine    Hx gastric ulcer 2/2 NSAID use  On famotidine  She is on Tramadol qid for left wrist pain s/p fracture 2017    She has ivkas on Prozac for depression since 2013  Saw therapist in the past  Drowsiness with higher doses  Denies SI  Denies drug, alcohol use          Additional history: as documented    Reviewed  and updated as needed this visit by clinical staff  Tobacco  Allergies  Meds  Med Hx  Surg Hx  Fam Hx  Soc Hx        Reviewed and updated as needed this visit by Provider         Patient Active Problem List   Diagnosis     Chronic rhinitis     CARDIOVASCULAR SCREENING; LDL GOAL LESS THAN 160     Malnutrition (H)     Iron deficiency anemia     Distal radius fracture     Joint inflammation of wrist - left     Gastroesophageal reflux disease without esophagitis     Major depressive disorder, recurrent episode, moderate (H)     Traumatic arthritis of left wrist     Past Surgical History:   Procedure Laterality Date     ENDOSCOPY  12-9-14     ENT SURGERY      tonsilectomy     GENITOURINARY SURGERY      tubes tied       Social History     Tobacco Use     Smoking status: Never Smoker     Smokeless tobacco: Never Used   Substance Use Topics     Alcohol use: No     History reviewed. No pertinent family history.      Current Outpatient Medications   Medication Sig Dispense Refill     ARIPiprazole (ABILIFY) 5 MG tablet TAKE 1 TABLET (5 MG) BY MOUTH AT  BEDTIME 90 tablet 1     famotidine (PEPCID) 40 MG tablet TAKE 1 TABLET BY MOUTH EVERYDAY AT BEDTIME 90 tablet 3     FLUoxetine (PROZAC) 40 MG capsule Take 1 capsule (40 mg) by mouth daily 90 capsule 1     pseudoePHEDrine (EQL 12 HOUR DECONGESTANT) 120 MG 12 hr tablet TAKE ONE TABLET BY MOUTH EVERY TWELVE HOURS 60 tablet 3     traMADol (ULTRAM) 50 MG tablet TAKE 1 TABLET BY MOUTH EVERY 6 HOURS AS NEEDED 120 tablet 0     traZODone (DESYREL) 100 MG tablet TAKE 1 TABLET BY MOUTH EVERY DAY AT BEDTIME AS NEEDED FOR SLEEP 90 tablet 1     Aspirin-Acetaminophen-Caffeine (EXCEDRIN PO) Take 2 tablets by mouth as needed       HYDROcodone-acetaminophen (NORCO) 5-325 MG per tablet Take 1 tablet by mouth every 6 hours as needed for moderate to severe pain maximum 4 tablet(s) per day (Patient not taking: Reported on 7/20/2018) 20 tablet 0     BP Readings from Last 3 Encounters:   05/16/19 126/66   07/20/18 134/83   03/15/18 137/71    Wt Readings from Last 3 Encounters:   05/16/19 47.6 kg (105 lb)   07/20/18 44.5 kg (98 lb)   03/15/18 46.7 kg (103 lb)                    ROS:  Constitutional, HEENT, cardiovascular, pulmonary, gi and gu systems are negative, except as otherwise noted.    OBJECTIVE:     /66 (BP Location: Right arm, Patient Position: Chair, Cuff Size: Adult Small)   Temp 97.2  F (36.2  C) (Oral)   Wt 47.6 kg (105 lb)   SpO2 98%   Breastfeeding? No   BMI 18.45 kg/m    Body mass index is 18.45 kg/m .  GENERAL: healthy, alert and no distress  HENT: ear canals and TM's normal, nose and mouth without ulcers or lesions  NECK: no adenopathy, no asymmetry, masses, or scars and thyroid normal to palpation  RESP: lungs clear to auscultation - no rales, rhonchi or wheezes  CV: regular rate and rhythm, normal S1 S2, no S3 or S4, no murmur, click or rub, no peripheral edema and peripheral pulses strong  PSYCH: mentation appears normal, affect normal/bright    Diagnostic Test Results:  none     ASSESSMENT/PLAN:        ICD-10-CM    1. Encounter for screening mammogram for breast cancer Z12.31 MA Screen with Implants Bilateral w/Mark   2. Gastric ulcer without hemorrhage or perforation, unspecified chronicity K25.9 famotidine (PEPCID) 40 MG tablet   3. Major depressive disorder, recurrent episode, moderate (H) F33.1 FLUoxetine (PROZAC) 40 MG capsule   4. Chronic non-seasonal allergic rhinitis J30.89 pseudoePHEDrine (EQL 12 HOUR DECONGESTANT) 120 MG 12 hr tablet     I would prefer to not have her on pseudoephedrine chronically though she reports having tried and failed several other medications  Recommend ENT referral and she declines  Will try 1/2 dose anti-histamine to see if she tolerates without drowsiness and this enables her to decrease use  Could try Singulair in future  Other chronic conditions stable  Will need pain contract for ongoing refills Tramadol  Unable to take NSAIDs  Annual physical in July  She is scheduled for mammogram    Patient Instructions   Try taking 1/2 tablet (5 mg) Zyrtec in the morning and then see if this allows you to reduce your use of the pseudoephedrine      ANNIKA Chavez [facility]

## 2019-05-23 ENCOUNTER — ANCILLARY PROCEDURE (OUTPATIENT)
Dept: MAMMOGRAPHY | Facility: CLINIC | Age: 59
End: 2019-05-23
Attending: NURSE PRACTITIONER
Payer: COMMERCIAL

## 2019-05-23 DIAGNOSIS — Z12.31 ENCOUNTER FOR SCREENING MAMMOGRAM FOR BREAST CANCER: ICD-10-CM

## 2019-05-23 PROCEDURE — 77067 SCR MAMMO BI INCL CAD: CPT | Mod: TC

## 2019-05-30 ENCOUNTER — TELEPHONE (OUTPATIENT)
Dept: FAMILY MEDICINE | Facility: CLINIC | Age: 59
End: 2019-05-30

## 2019-05-30 NOTE — TELEPHONE ENCOUNTER
TC attempted to reach patient. Her phone is not working, it rings half of a ring and then disconnects. PINKY has added patient to Meg Bernardo PA-C's schedule at 1:20PM on 06/03. There were no providers with a 1:00PM availability. Detailed notes added to appointment notes to notify patient at check in.  staff have also been given a heads up regarding this situation.

## 2019-05-30 NOTE — TELEPHONE ENCOUNTER
Please call and reschedule the RN ear wash on Monday 6/3/19 with a provider.    Patient did not have mention of any ear wax issues at 5/16/19 office visit and was not advised to see RN for ear wash, thus RN unable to do this.    Eleanor Mckee, SHERRELL  [facility]

## 2019-05-31 DIAGNOSIS — M12.532 TRAUMATIC ARTHRITIS OF LEFT WRIST: ICD-10-CM

## 2019-05-31 RX ORDER — TRAMADOL HYDROCHLORIDE 50 MG/1
TABLET ORAL
Qty: 120 TABLET | Refills: 0 | Status: SHIPPED | OUTPATIENT
Start: 2019-05-31 | End: 2019-06-28

## 2019-05-31 NOTE — TELEPHONE ENCOUNTER
Medication refilled  I saw her mid May and recommend follow up mid August. We can do pain contract at that time

## 2019-05-31 NOTE — TELEPHONE ENCOUNTER
Requested Prescriptions   Pending Prescriptions Disp Refills     traMADol (ULTRAM) 50 MG tablet [Pharmacy Med Name: TRAMADOL HCL 50 MG TABLET] 120 tablet 0     Sig: TAKE 1 TABLET BY MOUTH EVERY 6 HOURS AS NEEDED       There is no refill protocol information for this order        Last Written Prescription Date:  4/29/2019  Last Fill Quantity: 120,  # refills: 0   Last office visit: 5/16/2019 with prescribing provider:  Mikey Wharton Office Visit:   Next 5 appointments (look out 90 days)    Jun 03, 2019  1:20 PM CDT  Office Visit with Meg Bernardo PA-C  [facility] [address] [place] [id number]  [phone]         Routing refill request to provider for review/approval because:  Drug not on the [facility] refill protocol       Albania Hollis RN  [facility]

## 2019-06-03 ENCOUNTER — OFFICE VISIT (OUTPATIENT)
Dept: FAMILY MEDICINE | Facility: CLINIC | Age: 59
End: 2019-06-03
Payer: COMMERCIAL

## 2019-06-03 VITALS
BODY MASS INDEX: 17.93 KG/M2 | DIASTOLIC BLOOD PRESSURE: 86 MMHG | SYSTOLIC BLOOD PRESSURE: 142 MMHG | TEMPERATURE: 98.1 F | HEIGHT: 64 IN | WEIGHT: 105 LBS | HEART RATE: 83 BPM

## 2019-06-03 DIAGNOSIS — H61.23 BILATERAL IMPACTED CERUMEN: Primary | ICD-10-CM

## 2019-06-03 PROCEDURE — 99213 OFFICE O/P EST LOW 20 MIN: CPT | Performed by: PHYSICIAN ASSISTANT

## 2019-06-03 ASSESSMENT — MIFFLIN-ST. JEOR: SCORE: 1041.28

## 2019-06-03 NOTE — PROGRESS NOTES
"Subjective     Nataliia Flanagan is a 58 year old female who presents to clinic today for the following health issues:    HPI   Pt would like to have both of her ears washed out.  none    Has had decreased hearing over the last 2 months.   Wears a headset at work.   Has tried at home therapies without success.   No tinnitus.           Reviewed and updated as needed this visit by Provider  Tobacco  Allergies  Meds  Problems  Med Hx  Surg Hx  Fam Hx         Review of Systems   ROS COMP: Constitutional, HEENT, cardiovascular, pulmonary, gi and gu systems are negative, except as otherwise noted.      Objective    /86 (BP Location: Left arm, Patient Position: Chair, Cuff Size: Adult Regular)   Pulse 83   Temp 98.1  F (36.7  C) (Oral)   Ht 1.626 m (5' 4\")   Wt 47.6 kg (105 lb)   BMI 18.02 kg/m    Body mass index is 18.02 kg/m .  Physical Exam   GENERAL: healthy, alert and no distress  HENT: ear canals with bilateral cerumen impaction. MA performed water lavage. Unable to clear cerumen. Provider used a curette and was able to clear a small amount of softened wax from the right ear, none from the left.     Diagnostic Test Results:    none         Assessment & Plan       ICD-10-CM    1. Bilateral impacted cerumen H61.23 OTOLARYNGOLOGY REFERRAL     REMOVE IMPACTED CERUMEN   Referral to ENT for further removal. Patient has failed over the counter treatments and unable to successfully lavage both ears.        No follow-ups on file.    Meg Bernardo PA-C  [facility]      "

## 2019-06-12 ENCOUNTER — OFFICE VISIT (OUTPATIENT)
Dept: OTOLARYNGOLOGY | Facility: CLINIC | Age: 59
End: 2019-06-12
Payer: COMMERCIAL

## 2019-06-12 VITALS
BODY MASS INDEX: 17.93 KG/M2 | OXYGEN SATURATION: 99 % | HEIGHT: 64 IN | RESPIRATION RATE: 12 BRPM | HEART RATE: 77 BPM | DIASTOLIC BLOOD PRESSURE: 79 MMHG | WEIGHT: 105 LBS | SYSTOLIC BLOOD PRESSURE: 138 MMHG

## 2019-06-12 DIAGNOSIS — H61.23 BILATERAL IMPACTED CERUMEN: Primary | ICD-10-CM

## 2019-06-12 PROCEDURE — 69210 REMOVE IMPACTED EAR WAX UNI: CPT | Mod: 50 | Performed by: OTOLARYNGOLOGY

## 2019-06-12 ASSESSMENT — MIFFLIN-ST. JEOR: SCORE: 1041.28

## 2019-06-12 NOTE — LETTER
6/12/2019         RE: Nataliia Flanagan  [address] [id number]        Dear Colleague,    Thank you for referring your patient, Nataliia Flanagan, to the [facility]. Please see a copy of my visit note below.    Cerumen Removal - Patient here for ear cleaning. Denies ear pain, drainage, and infection.     Physical Exam and Procedure  Ears - On examination of the ears, I found that both ears were impacted with cerumen.  Therefore, I positioned the patient in the examination chair in a semi-supine position. I used the binocular surgical microscope to perform cerumen removal.  On the right side, I began by using a cerumen loop to gently lift the edges of the cerumen mass away from the walls of the external canal.  Once I did this, I was able to pull and then suction away fragments of wax and debris. I removed all the wax and debri.  The tympanic membrane was intact, no sign of perforation or middle ear effusion.    I turned my attention to the left side once again using the binocular surgical microscope to perform cerumen removal.  I began by using a cerumen loop to gently lift the edges of the cerumen mass away from the walls of the external canal.  Once I did this, I was able to pull and then suction away fragments of wax and debris. I removed all the wax and debri. The tympanic membrane was intact, no sign of perforation or middle ear effusion.    A/P - bilateral cerumen impaction. Both ears cleaned today in clinic. Return prn.       Again, thank you for allowing me to participate in the care of your patient.        Sincerely,        Waqar Blanton MD

## 2019-06-12 NOTE — PATIENT INSTRUCTIONS
General Scheduling Information  To schedule your CT/MRI scan, please contact [place] [facility] at [phone]   [address] [place] [address]    To schedule your Surgery, please contact our Specialty Schedulers at [phone]    ENT Clinic Locations Clinic Hours Telephone Number     [facility]  [address] [place] [address]   Tuesday:       8:00am -- 4:00pm    Wednesday:  8:00am - 4:00pm   To schedule an appointment with   Dr. Blanton,   please contact our   Specialty Scheduling Department at:     [phone]       [facility]  [address] [place] [address]   Friday:          8:00am - 4:00pm         Urgent Care Locations Clinic Hours Telephone Numbers     [facility]  [address] [place] [address]     Monday-Friday:     11:00pm - 9:00pm    Saturday-Sunday:  9:00am - 5:00pm   [phone]     [facility]  [address] [place] [address]     Monday-Friday:      5:00pm - 9:00pm     Saturday-Sunday:  9:00am - 5:00pm   [phone]

## 2019-06-12 NOTE — PROGRESS NOTES
Cerumen Removal - Patient here for ear cleaning. Denies ear pain, drainage, and infection.     Physical Exam and Procedure  Ears - On examination of the ears, I found that both ears were impacted with cerumen.  Therefore, I positioned the patient in the examination chair in a semi-supine position. I used the binocular surgical microscope to perform cerumen removal.  On the right side, I began by using a cerumen loop to gently lift the edges of the cerumen mass away from the walls of the external canal.  Once I did this, I was able to pull and then suction away fragments of wax and debris. I removed all the wax and debri.  The tympanic membrane was intact, no sign of perforation or middle ear effusion.    I turned my attention to the left side once again using the binocular surgical microscope to perform cerumen removal.  I began by using a cerumen loop to gently lift the edges of the cerumen mass away from the walls of the external canal.  Once I did this, I was able to pull and then suction away fragments of wax and debris. I removed all the wax and debri. The tympanic membrane was intact, no sign of perforation or middle ear effusion.    A/P - bilateral cerumen impaction. Both ears cleaned today in clinic. Return prn.

## 2019-06-28 DIAGNOSIS — M12.532 TRAUMATIC ARTHRITIS OF LEFT WRIST: ICD-10-CM

## 2019-07-01 RX ORDER — TRAMADOL HYDROCHLORIDE 50 MG/1
TABLET ORAL
Qty: 120 TABLET | Refills: 0 | Status: SHIPPED | OUTPATIENT
Start: 2019-07-01 | End: 2019-07-15

## 2019-07-01 NOTE — TELEPHONE ENCOUNTER
Requested Prescriptions   Pending Prescriptions Disp Refills     traMADol (ULTRAM) 50 MG tablet [Pharmacy Med Name: TRAMADOL HCL 50 MG TABLET] 120 tablet 0     Sig: TAKE 1 TAB BY MOUTH EVERY DAY 6 HOURS AS NEEDED       There is no refill protocol information for this order         Last Written Prescription Date:  5-31-19  Last Fill Quantity: 120,   # refills: 0  Last Office Visit: 5-16-19  Future Office visit:       Routing refill request to provider for review/approval because:  Drug not on the [facility] refill protocol or controlled substance

## 2019-07-01 NOTE — TELEPHONE ENCOUNTER
Script faxed to [facility].  TC contacted patient and scheduled follow up visit on 07/15/19.

## 2019-07-01 NOTE — TELEPHONE ENCOUNTER
Will forward this request to the last provider who saw her for this issue and filled the tramadol.    Bill Martin MD

## 2019-07-02 DIAGNOSIS — G47.00 PERSISTENT INSOMNIA: ICD-10-CM

## 2019-07-02 RX ORDER — TRAZODONE HYDROCHLORIDE 100 MG/1
TABLET ORAL
Qty: 90 TABLET | Refills: 1 | Status: SHIPPED | OUTPATIENT
Start: 2019-07-02 | End: 2019-12-10

## 2019-07-02 NOTE — TELEPHONE ENCOUNTER
"Trazodone      Last Written Prescription Date:  12/31/2018  Last Fill Quantity: 90,   # refills: 1  Last Office Visit: 6/3/2019  Future Office visit:    Next 5 appointments (look out 90 days)    Jul 15, 2019  1:20 PM CDT  Office Visit with ANNIKA Coronel CNP  [facility] [address] [id number] [phone]         Requested Prescriptions   Pending Prescriptions Disp Refills     traZODone (DESYREL) 100 MG tablet 90 tablet 1     Sig: TAKE 1 TABLET BY MOUTH EVERY DAY AT BEDTIME AS NEEDED FOR SLEEP       Serotonin Modulators Passed - 7/2/2019 10:57 AM        Passed - Recent (12 mo) or future (30 days) visit within the authorizing provider's specialty     Patient had office visit in the last 12 months or has a visit in the next 30 days with authorizing provider or within the authorizing provider's specialty.  See \"Patient Info\" tab in inbasket, or \"Choose Columns\" in Meds & Orders section of the refill encounter.              Passed - Medication is active on med list        Passed - Patient is age 18 or older        Passed - No active pregnancy on record        Passed - No positive pregnancy test in past 12 months          "

## 2019-07-02 NOTE — TELEPHONE ENCOUNTER
Reason for Call:  Medication or medication refill:    Do you use a [facility]?  Name of the pharmacy and phone number for the current request:  [id number], [place] [address]    Name of the medication requested: Trazadone 100 mg    Other request: [facility] would like a call when Rx sent.    Can we leave a detailed message on this number? NO    Phone number patient can be reached at: Other phone number:  [phone]    Best Time: Anytime    Call taken on 7/2/2019 at 10:52 AM by Sara Paige

## 2019-07-15 ENCOUNTER — OFFICE VISIT (OUTPATIENT)
Dept: FAMILY MEDICINE | Facility: CLINIC | Age: 59
End: 2019-07-15
Payer: COMMERCIAL

## 2019-07-15 VITALS
SYSTOLIC BLOOD PRESSURE: 118 MMHG | HEART RATE: 83 BPM | TEMPERATURE: 98.6 F | WEIGHT: 101 LBS | BODY MASS INDEX: 17.34 KG/M2 | OXYGEN SATURATION: 98 % | DIASTOLIC BLOOD PRESSURE: 70 MMHG

## 2019-07-15 DIAGNOSIS — Z23 NEED FOR TDAP VACCINATION: ICD-10-CM

## 2019-07-15 DIAGNOSIS — M12.532 TRAUMATIC ARTHRITIS OF LEFT WRIST: Primary | ICD-10-CM

## 2019-07-15 DIAGNOSIS — F33.1 MAJOR DEPRESSIVE DISORDER, RECURRENT EPISODE, MODERATE (H): ICD-10-CM

## 2019-07-15 DIAGNOSIS — G89.4 CHRONIC PAIN SYNDROME: ICD-10-CM

## 2019-07-15 DIAGNOSIS — E44.1 MILD PROTEIN-CALORIE MALNUTRITION (H): ICD-10-CM

## 2019-07-15 LAB
AMPHETAMINES UR QL: NOT DETECTED NG/ML
BARBITURATES UR QL SCN: NOT DETECTED NG/ML
BENZODIAZ UR QL SCN: NOT DETECTED NG/ML
BUPRENORPHINE UR QL: NOT DETECTED NG/ML
CANNABINOIDS UR QL: NOT DETECTED NG/ML
COCAINE UR QL SCN: NOT DETECTED NG/ML
D-METHAMPHET UR QL: NOT DETECTED NG/ML
METHADONE UR QL SCN: NOT DETECTED NG/ML
OPIATES UR QL SCN: NOT DETECTED NG/ML
OXYCODONE UR QL SCN: NOT DETECTED NG/ML
PCP UR QL SCN: NOT DETECTED NG/ML
PROPOXYPH UR QL: NOT DETECTED NG/ML
TRICYCLICS UR QL SCN: NOT DETECTED NG/ML

## 2019-07-15 PROCEDURE — 90715 TDAP VACCINE 7 YRS/> IM: CPT | Performed by: NURSE PRACTITIONER

## 2019-07-15 PROCEDURE — 99213 OFFICE O/P EST LOW 20 MIN: CPT | Mod: 25 | Performed by: NURSE PRACTITIONER

## 2019-07-15 PROCEDURE — 90471 IMMUNIZATION ADMIN: CPT | Performed by: NURSE PRACTITIONER

## 2019-07-15 PROCEDURE — 80306 DRUG TEST PRSMV INSTRMNT: CPT | Performed by: NURSE PRACTITIONER

## 2019-07-15 RX ORDER — TRAMADOL HYDROCHLORIDE 50 MG/1
TABLET ORAL
Qty: 120 TABLET | Refills: 0 | Status: SHIPPED | OUTPATIENT
Start: 2019-07-15 | End: 2019-08-22

## 2019-07-15 RX ORDER — FLUOXETINE 40 MG/1
40 CAPSULE ORAL DAILY
Qty: 90 CAPSULE | Refills: 1 | Status: SHIPPED | OUTPATIENT
Start: 2019-07-15 | End: 2019-12-10

## 2019-07-15 ASSESSMENT — PAIN SCALES - GENERAL: PAINLEVEL: NO PAIN (0)

## 2019-07-15 NOTE — LETTER
[facility]  07/15/19    Patient: Nataliia Flanagan  YOB: 1960  Medical Record Number: [id number]                                                                  Opioid / Opioid Plus Controlled Substance Agreement    I understand that my care provider has prescribed an opioid (narcotic) controlled substance to help manage my condition(s). I am taking this medicine to help me function or work. I know this is strong medicine, and that it can cause serious side effects. Opioid medicine can be sedating, addicting and may cause a dependency on the drug. They can affect my ability to drive or think, and cause depression. They need to be taken exactly as prescribed. Combining opioids with certain medicines or chemicals (such as cocaine, sedatives and tranquilizers, sleeping pills, meth) can be dangerous or even fatal. Also, if I stop opioids suddenly, I may have severe withdrawal symptoms. Last, I understand that opioids do not work for all types of pain nor for all patients. If not helpful, I may be asked to stop them.        The risks, benefits, and side effects of these medicine(s) were explained to me. I agree that:    1. I will take part in other treatments as advised by my care team. This may be psychiatry or counseling, physical therapy, behavioral therapy, group treatment or a referral to a pain clinic. I will reduce or stop my medicine when my care team tells me to do so.  2. I will take my medicines as prescribed. I will not change the dose or schedule unless my care team tells me to. There will be no refills if I  run out early.   I may be contactedwithout warning and asked to complete a urine drug test or pill count at any time.   3. I will keep all my appointments, and understand this is part of the monitoring of opioids. My care team may require an office visit for EVERY opioid/controlled substance refill. If I miss appointments or don t follow instructions, my care team  may stop my medicine.  4. I will not ask other providers to prescribe controlled substances, and I will not accept controlled substances from other people. If I need another prescribed controlled substance for a new reason, I will tell my care team within 1 business day.  5. I will use one pharmacy to fill all of my controlled substance prescriptions, and it is up to me to make sure that I do not run out of my medicines on weekends or holidays. If my care team is willing to refill my opioid prescription without a visit, I must request refills only during office hours, refills may take up to 3 days to process, and it may take up to 5 to 7 days for my medicine to be mailed and ready at my pharmacy. Prescriptions will not be mailed anywhere except my pharmacy.        [id number]  Rev 12/18         Registration to scan to EHR                             Page 1 of 2               Controlled Substance Agreement Opioid        [facility]  07/15/19  Patient: Nataliia Flanagan  YOB: 1960  Medical Record Number: [id number]                                                                  6. I am responsible for my prescriptions. If the medicine/prescription is lost or stolen, it will not be replaced. I also agree not to share controlled substance medicines with anyone.  7. I agree to not use ANY illegal or recreational drugs. This includes marijuana, cocaine, bath salts or other drugs. I agree not to use alcohol unless my care team says I may.          I agree to give urine samples whenever asked. If I don t give a urine sample, the care team may stop my medicine.    8. If I enroll in the [place] Medical Marijuana program, I will tell my care team. I will also sign an agreement to share my medical records with my care team.   9. I will bring in my list of medicines (or my medicine bottles) each time I come to the clinic.   10. I will tell my care team right away if I become pregnant or have a new  medical problem treated outside of my regular clinic.  11. I understand that this medicine can affect my thinking and judgment. It may be unsafe for me to drive, use machinery and do dangerous tasks. I will not do any of these things until I know how the medicine affects me. If my dose changes, I will wait to see how it affects me. I will contact my care team if I have concerns about medicine side effects.    I understand that if I do not follow any of the conditions above, my prescriptions or treatment may be stopped.      I agree that my provider, clinic care team, and pharmacy may work with any city, state or federal law enforcement agency that investigates the misuse, sale, or other diversion of my controlled medicine. I will allow my provider to discuss my care with or share a copy of this agreement with any other treating provider, pharmacy or emergency room where I receive care. I agree to give up (waive) any right of privacy or confidentiality with respect to these consents.     I have read this agreement and have asked questions about anything I did not understand.      ________________________________________________________________________  Patient signature - Date/Time -  Nataliia Flanagan                                      ________________________________________________________________________  Witness signature                                                            ________________________________________________________________________  Provider signature - ANNIKA Chavez CNP      [id number]  Rev 12/18         Registration to scan to EHR                         Page 2 of 2                   Controlled Substance Agreement Opioid           Page 1 of 2  Opioid Pain Medicines (also known as Narcotics)  What You Need to Know    What are opioids?   Opioids are pain medicines that must be prescribed by a doctor.  They are also known as narcotics.    Examples are:     morphine (MS Contin,  Ainsley)    oxycodone (Oxycontin)    oxycodone and acetaminophen (Percocet)    hydrocodone and acetaminophen (Vicodin, Norco)     fentanyl patch (Duragesic)     hydromorphone (Dilaudid)     methadone     What do opioids do well?   Opioids are best for short-term pain after a surgery or injury. They also work well for cancer pain. Unlike other pain medicines, they do not cause liver or kidney failure or ulcers. They may help some people with long-lasting (chronic) pain.     What do opioids NOT do well?   Opioids never get rid of pain entirely, and they do not work well for most patients with chronic pain. Opioids do not reduce swelling, one of the causes of pain. They also don t work well for nerve pain.                           For informational purposes only.  Not to replace the advice of your care provider.  Copyright [facility]. All right reserved. Preo 923133-[id number] 02/18.      Page 2 of 2    Risks and side effects   Talk to your doctor before you start or decide to keep taking one of these medicines. Side effects include:    Lowering your breathing rate enough to cause death    Overdose, including death, especially if taking higher than prescribed doses    Long-term opioid use    Worse depression symptoms; less pleasure in things you usually enjoy    Feeling tired or sluggish    Slower thoughts or cloudy thinking    Being more sensitive to pain over time; pain is harder to control    Trouble sleeping or restless sleep    Changes in hormone levels (for example, less testosterone)    Changes in sex drive or ability to have sex    Constipation    Unsafe driving    Itching and sweating    Feeling dizzy    Nausea, vomiting and dry mouth    What else should I know about opioids?  When someone takes opioids for too long or too often, they become dependent. This means that if you stop or reduce the medicine too quickly, you will have withdrawal symptoms.    Dependence is not the same as addiction.  Addiction is when people keep using a substance that harms their body, their mind or their relations with others. If you have a history of drug or alcohol abuse, taking opioids can cause a relapse.    Over time, opioids don t work as well. Most people will need higher and higher doses. The higher the dose, the more serious the side effects. We don t know the long-term effects of opioids.      Prescribed opioids aren't the best way to manage chronic pain    Other ways to manage pain include:      Ibuprofen or acetaminophen.  You should always try this first.      Treat health problems that may be causing pain.      acupuncture or massage, deep breathing, meditation, visual imagery, aromatherapy.      Use heat or ice at the pain site      Physical therapy and exercise      Stop smoking      See a counselor or therapist                                                  People who have used opioids for a long time may have a lower quality of life, worse depression, higher levels of pain and more visits to doctors.    Never share your opioids with others. Be sure to store opioids in a secure place, locked if possible.Young children can easily swallow them and overdose.     You can overdose on opioids.  Signs of overdose include decrease or loss of consciousness, slowed breathing, trouble waking and blue lips.  If someone is worried about overdose, they should call 911.    If you are at risk for overdose, you may get naloxone (Narcan, a medicine that reverses the effects of opioids.  If you overdose, a friend or family member can give you Narcan while waiting for the ambulance.  They need to know the signs of overdose and how to give Narcan.    While you're taking opioids:    Don't use alcohol or street drugs. Taking them together can cause death.    Don't take any of these medicines unless your doctor says its okay.  Taking these with opioids can cause death.    Benzodiazepines (such as lorazepam         or  diazepam)    Muscle relaxers (such as cyclobenzaprine)    sleeping pills    other opioids    Safe disposal of opioids  Find your area drug take-back program, your pharmacy mail-back program, buy a special disposal bag (such as Deterra) from your pharmacy or flush them down the toilet.  Use the guidelines at:  www.fda.gov/drugs/resourcesforyou

## 2019-07-15 NOTE — PROGRESS NOTES
Subjective     Nataliia Flanagan is a 58 year old female who presents to clinic today for the following health issues:    HPI   Chronic Pain Follow-Up       Type / Location of Pain: Left wrist  Analgesia/pain control:       Recent changes:  Improved with medication     Overall control: Comfortably manageable  Activity level/function:      Daily activities:  Able to do all daily activities    Work:  Able to work part time without limitations  Adverse effects:  No  Adherance    Taking medication as directed?  Yes    Participating in other treatments: no -   Risk Factors:    Sleep:  Good    Mood/anxiety:  controlled    Recent family or social stressors:  none noted    Other aggravating factors: none  PHQ-9 SCORE 9/1/2017 9/18/2017 5/16/2019   PHQ-9 Total Score - - -   PHQ-9 Total Score 24 16 5     CAROLYN-7 SCORE 8/3/2016   Total Score 17     Encounter-Level CSA:    There are no encounter-level csa.     Patient-Level CSA:    There are no patient-level csa.         Amount of exercise or physical activity: None    Problems taking medications regularly: No    Medication side effects: none    Diet: regular (no restrictions)    She is here to establish a CSA for her chronic pain  She had a contract with her previous primary care provider who has since moved out of state  She has chronic left wrist pain s/p fracture in 2017  She can't tolerate NSAIDs d/t history of GI bleed  She received injections in past but were minimally effective and she was worried about SE with ongoing use        Patient Active Problem List   Diagnosis     Chronic rhinitis     CARDIOVASCULAR SCREENING; LDL GOAL LESS THAN 160     Malnutrition (H)     Iron deficiency anemia     Distal radius fracture     Joint inflammation of wrist - left     Gastroesophageal reflux disease without esophagitis     Major depressive disorder, recurrent episode, moderate (H)     Traumatic arthritis of left wrist     Chronic pain syndrome     Past Surgical History:   Procedure  Laterality Date     ENDOSCOPY  12-9-14     ENT SURGERY      tonsilectomy     GENITOURINARY SURGERY      tubes tied       Social History     Tobacco Use     Smoking status: Never Smoker     Smokeless tobacco: Never Used   Substance Use Topics     Alcohol use: No     History reviewed. No pertinent family history.      Current Outpatient Medications   Medication Sig Dispense Refill     ARIPiprazole (ABILIFY) 5 MG tablet TAKE 1 TABLET (5 MG) BY MOUTH AT BEDTIME 90 tablet 1     Aspirin-Acetaminophen-Caffeine (EXCEDRIN PO) Take 2 tablets by mouth as needed       famotidine (PEPCID) 40 MG tablet TAKE 1 TABLET BY MOUTH EVERYDAY AT BEDTIME 90 tablet 3     FLUoxetine (PROZAC) 40 MG capsule Take 1 capsule (40 mg) by mouth daily 90 capsule 1     pseudoePHEDrine (EQL 12 HOUR DECONGESTANT) 120 MG 12 hr tablet TAKE ONE TABLET BY MOUTH EVERY TWELVE HOURS 60 tablet 3     traMADol (ULTRAM) 50 MG tablet TAKE 1 TAB BY MOUTH EVERY DAY 6 HOURS AS NEEDED 120 tablet 0     traZODone (DESYREL) 100 MG tablet TAKE 1 TABLET BY MOUTH EVERY DAY AT BEDTIME AS NEEDED FOR SLEEP 90 tablet 1         Reviewed and updated as needed this visit by Provider         Review of Systems   ROS COMP: Constitutional, HEENT, cardiovascular, pulmonary, gi and gu systems are negative, except as otherwise noted.      Objective    /70 (BP Location: Right arm, Patient Position: Chair, Cuff Size: Adult Small)   Pulse 83   Temp 98.6  F (37  C) (Oral)   Wt 45.8 kg (101 lb)   SpO2 98%   BMI 17.34 kg/m    Body mass index is 17.34 kg/m .  Physical Exam   GENERAL: healthy, alert and no distress  RESP: lungs clear to auscultation - no rales, rhonchi or wheezes  CV: regular rate and rhythm, normal S1 S2, no S3 or S4, no murmur, click or rub, no peripheral edema and peripheral pulses strong  MS: no gross musculoskeletal defects noted, no edema  SKIN: no suspicious lesions or rashes  NEURO: Normal strength and tone, mentation intact and speech normal    Diagnostic Test  Results:  Labs reviewed in Epic        Assessment & Plan       ICD-10-CM    1. Traumatic arthritis of left wrist M12.532 traMADol (ULTRAM) 50 MG tablet   2. Major depressive disorder, recurrent episode, moderate (H) F33.1 FLUoxetine (PROZAC) 40 MG capsule   3. Chronic pain syndrome G89.4 Drug Abuse Screen Panel 13, Urine (Pain Care Package)   4. Mild protein-calorie malnutrition (H) E44.1    5. Need for Tdap vaccination Z23 TDAP, IM (10 - 64 YRS) - Adacel     VACCINE ADMINISTRATION, INITIAL          Established CSA today  Will need every 3 month follow up  She is also overdue for Tdap vaccination so that was done today    ANNIKA Chavez [facility]

## 2019-07-15 NOTE — NURSING NOTE
Screening Questionnaire for Adult Immunization    1. Are you sick today? No  2. Do  you have allergies to medications, food, a vaccine component, or latex? No   3. Have you had a serious reaction to a vaccine in the past? No   4. Do you have a long-term  health problem with lung, heart, kidney or metabolic disease(e.g., diabetes), asthma, or a blood disorder?  No  5.Do you have cancer,leukemia,HIV/AIDS, or any other immune system problem ? No  6. In the past 3 months, have you taken medications that weaken your immune sytem,      Such as cortisone, prednisone, other steroids, or anticancer drugs, or have you had radiation treatments ?No  7. Have you had a seizure or brain or other nervous system problems? No  8. During the past year, have you received a transfusion of blood or blood products, or been given immune      (gamma) globulin or an antiviral drug? No  9. For women: Are you pregnant or is there a chance you could become pregnant during the next month?No   10. Have you received any vaccinations in the past 4 weeks ? No    Immunization questionnaire answers were all negative.     Screening performed by patient/cm  VIS for Tdap given on same date of administration.  Staff signature/Title: JACKY Dillard MA

## 2019-08-22 ENCOUNTER — OFFICE VISIT (OUTPATIENT)
Dept: FAMILY MEDICINE | Facility: CLINIC | Age: 59
End: 2019-08-22
Payer: COMMERCIAL

## 2019-08-22 VITALS
TEMPERATURE: 97 F | HEART RATE: 91 BPM | DIASTOLIC BLOOD PRESSURE: 74 MMHG | BODY MASS INDEX: 16.82 KG/M2 | WEIGHT: 98 LBS | SYSTOLIC BLOOD PRESSURE: 132 MMHG | OXYGEN SATURATION: 100 %

## 2019-08-22 DIAGNOSIS — M25.512 ACUTE PAIN OF LEFT SHOULDER: Primary | ICD-10-CM

## 2019-08-22 DIAGNOSIS — M12.532 TRAUMATIC ARTHRITIS OF LEFT WRIST: ICD-10-CM

## 2019-08-22 DIAGNOSIS — Z12.11 SPECIAL SCREENING FOR MALIGNANT NEOPLASMS, COLON: ICD-10-CM

## 2019-08-22 PROCEDURE — 99213 OFFICE O/P EST LOW 20 MIN: CPT | Mod: 25 | Performed by: NURSE PRACTITIONER

## 2019-08-22 PROCEDURE — 20610 DRAIN/INJ JOINT/BURSA W/O US: CPT | Mod: LT | Performed by: NURSE PRACTITIONER

## 2019-08-22 RX ORDER — TRIAMCINOLONE ACETONIDE 40 MG/ML
60 INJECTION, SUSPENSION INTRA-ARTICULAR; INTRAMUSCULAR ONCE
Status: COMPLETED | OUTPATIENT
Start: 2019-08-22 | End: 2019-08-22

## 2019-08-22 RX ORDER — TRAMADOL HYDROCHLORIDE 50 MG/1
TABLET ORAL
Qty: 120 TABLET | Refills: 0 | Status: SHIPPED | OUTPATIENT
Start: 2019-08-22 | End: 2019-10-01

## 2019-08-22 RX ADMIN — TRIAMCINOLONE ACETONIDE 60 MG: 40 INJECTION, SUSPENSION INTRA-ARTICULAR; INTRAMUSCULAR at 13:15

## 2019-08-22 ASSESSMENT — PAIN SCALES - GENERAL: PAINLEVEL: NO PAIN (0)

## 2019-08-22 NOTE — PROGRESS NOTES
Subjective     Nataliia Flanagan is a 58 year old female who presents to clinic today for the following health issues:    HPI   Musculoskeletal problem/pain      Duration: Couple months ago, getting worse    Description  Location: left shoulder, elbow    Intensity:  Varies depending on movement    Accompanying signs and symptoms: none    History  Previous similar problem: no   Previous evaluation:  none    Precipitating or alleviating factors:  Trauma or overuse: YES- Overuse  Aggravating factors include: lifting and movement, laying on it at night    Therapies tried and outcome: TraMADol, helps a little bit    She complains of 2 month history of left shoulder pain without history of injury or trauma  Pain is constant, worse with activity and when sleeping  She has pain when sleeping if she is laying on her left shoulder  She does a lot of repetitive lifting at work  She takes Tramadol for other chronic pains and it does not seem to help her shoulder jpain  She is interested in an injection today  She has received injections in other joints (knees and wrist) which were helpful in the past        Patient Active Problem List   Diagnosis     Chronic rhinitis     CARDIOVASCULAR SCREENING; LDL GOAL LESS THAN 160     Malnutrition (H)     Iron deficiency anemia     Distal radius fracture     Joint inflammation of wrist - left     Gastroesophageal reflux disease without esophagitis     Major depressive disorder, recurrent episode, moderate (H)     Traumatic arthritis of left wrist     Chronic pain syndrome     Past Surgical History:   Procedure Laterality Date     ENDOSCOPY  12-9-14     ENT SURGERY      tonsilectomy     GENITOURINARY SURGERY      tubes tied       Social History     Tobacco Use     Smoking status: Never Smoker     Smokeless tobacco: Never Used   Substance Use Topics     Alcohol use: No     History reviewed. No pertinent family history.        Reviewed and updated as needed this visit by Provider          Review of Systems   ROS COMP: Constitutional, HEENT, cardiovascular, pulmonary, gi and gu systems are negative, except as otherwise noted.      Objective    /74 (BP Location: Right arm, Patient Position: Chair, Cuff Size: Adult Small)   Pulse 91   Temp 97  F (36.1  C) (Oral)   Wt 44.5 kg (98 lb)   SpO2 100%   BMI 16.82 kg/m    Body mass index is 16.82 kg/m .  Physical Exam   GENERAL: healthy, alert and no distress  RESP: lungs clear to auscultation - no rales, rhonchi or wheezes  CV: regular rate and rhythm, normal S1 S2, no S3 or S4, no murmur, click or rub, no peripheral edema and peripheral pulses strong  MS: Flexion intact to 180 degrees. Abduction limited to 80 degrees due to pain. Able to passively abduction to 90 degrees. Negative empty can test. Tenderness to subacromial bursa  SKIN: no suspicious lesions or rashes  NEURO: Normal strength and tone, mentation intact and speech normal    This procedure has been fully reviewed with the patient including risks and benefits. After cleaning area with betadine, a steroid injection was performed at t shoulder using 1% plain Lidocaine (1.5 cc) and 60 mg of K40 (1.5 cc). This was well tolerated.          Assessment & Plan       ICD-10-CM    1. Acute pain of left shoulder M25.512 triamcinolone (KENALOG-40) injection 60 mg     DRAIN/INJECT LARGE JOINT/BURSA   2. Traumatic arthritis of left wrist M12.532 traMADol (ULTRAM) 50 MG tablet   3. Special screening for malignant neoplasms, colon Z12.11 Fecal colorectal cancer screen (FIT)          I suspect she has subacromial bursitis. Will treat with steroid injection and ROM exercises. Recommend rest and ice for next several days until she begins to note improvement in pain and mobility. Recommend avoiding lifting greater than 10 pounds or repetitive activity of left shoulder. I excused her from work for the next several days. Reviewed ROM exercises she can do from home. If not improving, she will call  clinical for referral to physical therapy.     ANNIKA Chavez CNP  [facility]

## 2019-08-22 NOTE — LETTER
[address] [address]  Phone: [phone]  Fax: [phone]    August 22, 2019        Nataliia Flanagan  [facility] [address]          To whom it may concern:    RE: Nataliia Flanagan    Patient was seen and treated today at our clinic. Please excuse her absence from work today (8/22). She may need to be absent on Sunday, August 25 as well depending on her symptoms.     Please contact me for questions or concerns.      Sincerely,        ANNIKA Chavez CNP

## 2019-09-04 PROCEDURE — 82274 ASSAY TEST FOR BLOOD FECAL: CPT | Performed by: NURSE PRACTITIONER

## 2019-09-08 LAB — HEMOCCULT STL QL IA: NEGATIVE

## 2019-09-09 DIAGNOSIS — Z12.11 SPECIAL SCREENING FOR MALIGNANT NEOPLASMS, COLON: ICD-10-CM

## 2019-09-09 NOTE — RESULT ENCOUNTER NOTE
Nataliia,  Thank you for completing your FIT test to screen for colon cancer. Your FIT test is negative. Remember, you will need to complete this annually in lieu of a colonoscopy.  Zoe Jensen, CNP

## 2019-09-23 DIAGNOSIS — F33.1 MAJOR DEPRESSIVE DISORDER, RECURRENT EPISODE, MODERATE (H): ICD-10-CM

## 2019-09-23 NOTE — TELEPHONE ENCOUNTER
Requested Prescriptions   Pending Prescriptions Disp Refills     ARIPiprazole (ABILIFY) 5 MG tablet [Pharmacy Med Name: ARIPIPRAZOLE 5 MG TABLET] 90 tablet 1     Sig: TAKE 1 TABLET (5 MG) BY MOUTH AT BEDTIME   Last Written Prescription Date:  3/26/19  Last Fill Quantity: 90,  # refills: 1   Last office visit: 8/22/2019 with prescribing provider:     Future Office Visit:        Antipsychotic Medications Failed - 9/23/2019  1:48 AM        Failed - Lipid panel on file within the past 12 months     Recent Labs   Lab Test 07/20/18  1442 07/03/12  1135   CHOL 194 179   TRIG 67 104   HDL 78 52   * 106   NHDL 116  --    VLDL  --  21   CHOLHDLRATIO  --  3.4               Failed - CBC on file in past 12 months     Recent Labs   Lab Test 02/28/17  1122   WBC 3.8*   RBC 4.00   HGB 11.9   HCT 35.5                    Failed - A1c or Glucose on file in past 12 months     Recent Labs   Lab Test 07/20/18  1442   GLC 87       Please review patients last 3 weights. If a weight gain of >10 lbs exists, you may refill the prescription once after instructing the patient to schedule an appointment within the next 30 days.    Wt Readings from Last 3 Encounters:   08/22/19 44.5 kg (98 lb)   07/15/19 45.8 kg (101 lb)   06/12/19 47.6 kg (105 lb)             Passed - Blood pressure under 140/90 in past 12 months     BP Readings from Last 3 Encounters:   08/22/19 132/74   07/15/19 118/70   06/12/19 138/79                 Passed - Patient is 12 years of age or older        Passed - Heart Rate on file within past 12 months     Pulse Readings from Last 3 Encounters:   08/22/19 91   07/15/19 83   06/12/19 77               Passed - Medication is active on med list        Passed - Patient is not pregnant        Passed - No positve pregnancy test on file in past 12 months        Passed - Recent (6 mo) or future (30 days) visit within the authorizing provider's specialty     Patient had office visit in the last 6 months or has a visit in  "the next 30 days with authorizing provider or within the authorizing provider's specialty.  See \"Patient Info\" tab in inbasket, or \"Choose Columns\" in Meds & Orders section of the refill encounter.              "

## 2019-09-24 DIAGNOSIS — J30.89 CHRONIC NON-SEASONAL ALLERGIC RHINITIS: ICD-10-CM

## 2019-09-24 RX ORDER — ARIPIPRAZOLE 5 MG/1
TABLET ORAL
Qty: 90 TABLET | Refills: 1 | Status: SHIPPED | OUTPATIENT
Start: 2019-09-24 | End: 2019-12-10

## 2019-09-24 RX ORDER — PSEUDOEPHEDRINE HCL 120 MG/1
TABLET, FILM COATED, EXTENDED RELEASE ORAL
Qty: 60 TABLET | Refills: 2 | Status: SHIPPED | OUTPATIENT
Start: 2019-09-24 | End: 2019-12-18

## 2019-09-24 NOTE — TELEPHONE ENCOUNTER
One month follow up was advised at last visit 8/22/19 for shoulder pain.   Has not been done, nothing scheduled.    Routing refill request to provider for review/approval because:  Labs not current:  Lipid panel, CBC, A1C    Eleanor Mckee RN  [facility]

## 2019-10-01 DIAGNOSIS — M12.532 TRAUMATIC ARTHRITIS OF LEFT WRIST: ICD-10-CM

## 2019-10-01 NOTE — TELEPHONE ENCOUNTER
Requested Prescriptions   Pending Prescriptions Disp Refills     traMADol (ULTRAM) 50 MG tablet [Pharmacy Med Name: TRAMADOL HCL 50 MG TABLET] 120 tablet 0     Sig: TAKE 1 TABLET BY MOUTH EVERY 6 HOURS AS NEEDED FOR PAIN       There is no refill protocol information for this order         Last Written Prescription Date:  8/22/19  Last Fill Quantity: 120,   # refills: 0  Last Office Visit: 8/22/19  Future Office visit:       Routing refill request to provider for review/approval because:  Drug not on the [facility], P or [facility] refill protocol or controlled substance

## 2019-10-02 RX ORDER — TRAMADOL HYDROCHLORIDE 50 MG/1
TABLET ORAL
Qty: 120 TABLET | Refills: 0 | Status: SHIPPED | OUTPATIENT
Start: 2019-10-02 | End: 2019-11-02

## 2019-11-02 DIAGNOSIS — M12.532 TRAUMATIC ARTHRITIS OF LEFT WRIST: ICD-10-CM

## 2019-11-04 RX ORDER — TRAMADOL HYDROCHLORIDE 50 MG/1
TABLET ORAL
Qty: 120 TABLET | Refills: 0 | Status: SHIPPED | OUTPATIENT
Start: 2019-11-04 | End: 2019-12-03

## 2019-11-04 NOTE — TELEPHONE ENCOUNTER
Requested Prescriptions   Pending Prescriptions Disp Refills     traMADol (ULTRAM) 50 MG tablet [Pharmacy Med Name: TRAMADOL HCL 50 MG TABLET] 120 tablet 0     Sig: TAKE 1 TABLET BY MOUTH EVERY 6 HOURS AS NEEDED FOR PAIN       There is no refill protocol information for this order        Last Written Prescription Date:  10/2/2019  Last Fill Quantity: 120,  # refills: 0   Last office visit: 8/22/2019 with prescribing provider:     Future Office Visit:      Routing refill request to provider for review/approval because:  Drug not on the [facility] refill protocol       Albania Hollis RN  [facility]

## 2019-12-03 ENCOUNTER — TELEPHONE (OUTPATIENT)
Dept: FAMILY MEDICINE | Facility: CLINIC | Age: 59
End: 2019-12-03

## 2019-12-03 DIAGNOSIS — M12.532 TRAUMATIC ARTHRITIS OF LEFT WRIST: ICD-10-CM

## 2019-12-03 RX ORDER — TRAMADOL HYDROCHLORIDE 50 MG/1
TABLET ORAL
Qty: 120 TABLET | Refills: 0 | Status: SHIPPED | OUTPATIENT
Start: 2019-12-03 | End: 2019-12-10

## 2019-12-03 NOTE — TELEPHONE ENCOUNTER
Requested Prescriptions   Pending Prescriptions Disp Refills     traMADol (ULTRAM) 50 MG tablet [Pharmacy Med Name: TRAMADOL HCL 50 MG TABLET] 120 tablet 0     Sig: TAKE 1 TABLET BY MOUTH EVERY 6 HOURS AS NEEDED FOR PAIN       There is no refill protocol information for this order         Last Written Prescription Date:  11/4/19  Last Fill Quantity: 120,   # refills: 0  Last Office Visit: 8/22/19  Future Office visit:       Routing refill request to provider for review/approval because:  Drug not on the [facility], [facility] or [facility] refill protocol or controlled substance

## 2019-12-04 NOTE — TELEPHONE ENCOUNTER
TC spoke with patient and scheduled office visit with Meg Beranrdo PA-C on 12/10 to establish care.

## 2019-12-10 ENCOUNTER — OFFICE VISIT (OUTPATIENT)
Dept: FAMILY MEDICINE | Facility: CLINIC | Age: 59
End: 2019-12-10
Payer: COMMERCIAL

## 2019-12-10 VITALS
OXYGEN SATURATION: 100 % | WEIGHT: 94.6 LBS | HEART RATE: 76 BPM | SYSTOLIC BLOOD PRESSURE: 126 MMHG | TEMPERATURE: 97.8 F | BODY MASS INDEX: 16.24 KG/M2 | DIASTOLIC BLOOD PRESSURE: 76 MMHG

## 2019-12-10 DIAGNOSIS — M12.532 TRAUMATIC ARTHRITIS OF LEFT WRIST: Primary | ICD-10-CM

## 2019-12-10 DIAGNOSIS — G47.00 PERSISTENT INSOMNIA: ICD-10-CM

## 2019-12-10 DIAGNOSIS — G89.4 CHRONIC PAIN SYNDROME: ICD-10-CM

## 2019-12-10 DIAGNOSIS — F33.1 MAJOR DEPRESSIVE DISORDER, RECURRENT EPISODE, MODERATE (H): ICD-10-CM

## 2019-12-10 PROCEDURE — 99214 OFFICE O/P EST MOD 30 MIN: CPT | Performed by: PHYSICIAN ASSISTANT

## 2019-12-10 RX ORDER — TRAZODONE HYDROCHLORIDE 100 MG/1
TABLET ORAL
Qty: 90 TABLET | Refills: 1 | Status: SHIPPED | OUTPATIENT
Start: 2019-12-10 | End: 2020-04-13

## 2019-12-10 RX ORDER — FLUOXETINE 40 MG/1
40 CAPSULE ORAL DAILY
Qty: 90 CAPSULE | Refills: 1 | Status: SHIPPED | OUTPATIENT
Start: 2019-12-10 | End: 2020-04-13

## 2019-12-10 RX ORDER — TRAMADOL HYDROCHLORIDE 50 MG/1
50 TABLET ORAL EVERY 6 HOURS PRN
Qty: 120 TABLET | Refills: 0 | Status: SHIPPED | OUTPATIENT
Start: 2019-12-10 | End: 2020-02-03

## 2019-12-10 RX ORDER — ARIPIPRAZOLE 5 MG/1
5 TABLET ORAL DAILY
Qty: 90 TABLET | Refills: 1 | Status: SHIPPED | OUTPATIENT
Start: 2019-12-10 | End: 2020-04-13

## 2019-12-10 ASSESSMENT — PATIENT HEALTH QUESTIONNAIRE - PHQ9: SUM OF ALL RESPONSES TO PHQ QUESTIONS 1-9: 8

## 2019-12-10 NOTE — PROGRESS NOTES
Subjective     Nataliia Flanagan is a 59 year old female who presents to clinic today for the following health issues:    HPI   Medication Followup of tramadol    Taking Medication as prescribed: yes    Side Effects:  None    Medication Helping Symptoms:  yes     Patient here to establish care- mainly pain management since her PCP has left our clinic.       She broke her left wrist in 2015. She did not do formal physical therapy. She had 2 steroid shots in it and was told not to have more. She has had chronic pain since. She take Tramadol 50mg-She takes 1 pill 4 times per day.     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   She can't take NSAIDS because of an old GI ulcer.   Notes tylenol doesn't help.   Tired wearing a wrist splint but that made it more stiff.   No numbness/tingling in the fingers. Sometimes some weakness in the hand.   Right hand dominant but at work uses both hands to do her job.     Prozac and Trazodone need refills. She notes these do help her mood.   Does not want to increase the dose. She does use the trazodone nightly.       Reviewed and updated as needed this visit by Provider  Tobacco  Allergies  Meds  Problems  Med Hx  Surg Hx  Fam Hx         Review of Systems   ROS COMP: Constitutional, HEENT, cardiovascular, pulmonary, gi and gu systems are negative, except as otherwise noted.      Objective    /76 (BP Location: Left arm, Patient Position: Chair, Cuff Size: Adult Regular)   Pulse 76   Temp 97.8  F (36.6  C) (Oral)   Wt 42.9 kg (94 lb 9.6 oz)   SpO2 100%   BMI 16.24 kg/m    Body mass index is 16.24 kg/m .  Physical Exam   GENERAL: healthy, alert and no distress  MS: no gross musculoskeletal defects noted, no edema- full range of motion of the left wrist. Minimal pain pronation and supination of the left wrist. No swelling or bruising. Normal  strength.   SKIN: no suspicious lesions or rashes  NEURO: Normal strength and tone,  mentation intact and speech normal    Diagnostic Test Results:  none         Assessment & Plan       ICD-10-CM    1. Traumatic arthritis of left wrist M12.532 PAIN MANAGEMENT REFERRAL     traMADol (ULTRAM) 50 MG tablet   2. Persistent insomnia G47.00 traZODone (DESYREL) 100 MG tablet   3. Major depressive disorder, recurrent episode, moderate (H) F33.1 FLUoxetine (PROZAC) 40 MG capsule     ARIPiprazole (ABILIFY) 5 MG tablet   4. Chronic pain syndrome G89.4    Refilled depression medications. Patient is stable at this time.   We discussed her chronic pain medication usage. I told her I did not feel that this is an appropriate long term treatment plan for her pain. Patient was referred to pain management for further discussion on alternative options for her pain.   I will continue to refill until seen by pain management and new plan in place.          Return in about 3 months (around 3/10/2020) for Pain Check.    Meg Bernardo PA-C  [facility]

## 2019-12-10 NOTE — PATIENT INSTRUCTIONS
1. Schedule with the pain management team.   2. Follow up here in 3 months if not in with pain management.

## 2019-12-11 ENCOUNTER — TELEPHONE (OUTPATIENT)
Dept: PALLIATIVE MEDICINE | Facility: CLINIC | Age: 59
End: 2019-12-11

## 2019-12-11 NOTE — LETTER
December 12, 2019    Nataliia Flanagan  [address], [place]. \[facility].\"" [id number]    Dear Nataliia,                                                                 Welcome to the [facility].  We are located on the 2nd floor ([address]) of the [facility], located at [address] [place] [address] [place] [address].    Your appointment at the [facility] has been scheduled on January 24th at 2:00PM with Florinda Roberts NP.    At your first visit, you will meet your team of caregivers who will help you to develop pain management strategies that will last a lifetime. You will meet with our support staff to review your insurance information, and collect your co-payment if required by your insurance company. You will also meet with a medical pain specialist and care coordinator who will assess your pain and develop a plan of care for your successful pain rehabilitation. You should expect to spend 1-2 hours at your first visit with us. Usually, patients work with us for a period of 6-12 months, and eventually return to their primary doctor once their pain management has stabilized.      To help us make your visit go as smoothly as possible, please bring the following items with you on your visit:     Completed Pain Questionnaire enclosed in this packet.  If you do not bring the completed questionnaire, we may have to reschedule your appointment.  List of any medicines that you are currently taking or have been prescribed  Important NON-[place] medical information such as medical records or tests results (X-rays, or laboratory tests)  Your health insurance card  Financial resources to cover your co-payment or balance due at the time of service (cash, personal check, Visa, and MasterCard are acceptable methods of payment)     Due to the demand for new patient evaluations, you must notify the scheduling department 48 hours in advance if you are not able to keep this  appointment. Failure to do so could affect your ability to reschedule with our clinic. Please be aware that we will not prescribe any medications at your first visit.     Please call [phone] with any questions regarding your appointment. We look forward to meeting you and working to address your health care needs.     Sincerely,          [facility]

## 2019-12-11 NOTE — TELEPHONE ENCOUNTER
SANDRO to schedule to schedule new eval.    Divya DIANE    [facility]

## 2019-12-12 NOTE — TELEPHONE ENCOUNTER
Pain Management Center Referral      1. Confirmed address with patient? Yes  2. Confirmed phone number with patient? Yes  3. Confirmed referring provider? Yes  4. Is the PCP the same as the referring provider? Yes  5. Has the patient been to any previous pain clinics? No  (If yes, send SILVER with welcome letter)  6. Which insurance are we to bill for this appointment?  BCBS    7. Informed pt of cancellation (48 hour) policy? Yes    REGARDING OPIOID MEDICATIONS: We will always address appropriateness of opioid pain medications, but we generally will not automatically take on a prescribing role. When we do take on prescribing of opioids for chronic pain, it is in collaboration with the referring physician for an intermediate period of time (months), with an expectation that the primary physician or provider will assume the prescribing role if medications are effective at stable doses with demonstrated compliance. Therefore, please do not assume that your prescribing responsibilities end on the day of pain clinic consultation.  8. Informed pt of prescribing policy? Yes    9.Please be aware that once you are established with a pain provider and location, you will need to continue have all future visits with that provider and location. It is best to determine what location is the most convenient for you and schedule with that one.    ** PATIENT INFORMED OF THIS POLICY Yes      9. Referring Provider: Zoe Jensen    [facility]

## 2019-12-18 DIAGNOSIS — J30.89 CHRONIC NON-SEASONAL ALLERGIC RHINITIS: ICD-10-CM

## 2019-12-18 RX ORDER — PSEUDOEPHEDRINE HCL 120 MG/1
TABLET, FILM COATED, EXTENDED RELEASE ORAL
Qty: 60 TABLET | Refills: 0 | Status: SHIPPED | OUTPATIENT
Start: 2019-12-18 | End: 2020-01-31

## 2019-12-18 NOTE — TELEPHONE ENCOUNTER
Routing refill request to provider for review/approval because:  Drug not on the [facility] refill protocol   Sri Moran RN,BSN  [facility]

## 2019-12-18 NOTE — TELEPHONE ENCOUNTER
Requested Prescriptions   Pending Prescriptions Disp Refills     pseudoePHEDrine (EQL 12 HOUR DECONGESTANT) 120 MG 12 hr tablet [Pharmacy Med Name: EQL 12 Hour Decongestant Oral Tablet Extended Release 12 Hour 120 MG] 60 tablet 1     Sig: TAKE ONE TABLET BY MOUTH EVERY TWELVE HOURS       There is no refill protocol information for this order   Last Written Prescription Date:  9-24-19  Last Fill Quantity: 60,  # refills: 2   Last office visit: 12/10/2019 with prescribing provider:  8-22-19   Future Office Visit:

## 2020-01-24 ENCOUNTER — OFFICE VISIT (OUTPATIENT)
Dept: PALLIATIVE MEDICINE | Facility: CLINIC | Age: 60
End: 2020-01-24
Payer: COMMERCIAL

## 2020-01-24 VITALS
BODY MASS INDEX: 16.74 KG/M2 | SYSTOLIC BLOOD PRESSURE: 140 MMHG | DIASTOLIC BLOOD PRESSURE: 84 MMHG | WEIGHT: 97.5 LBS | HEART RATE: 91 BPM

## 2020-01-24 DIAGNOSIS — M19.132 POST-TRAUMATIC OSTEOARTHRITIS OF LEFT WRIST: ICD-10-CM

## 2020-01-24 DIAGNOSIS — M25.532 CHRONIC PAIN OF LEFT WRIST: Primary | ICD-10-CM

## 2020-01-24 DIAGNOSIS — G89.29 CHRONIC PAIN OF LEFT WRIST: Primary | ICD-10-CM

## 2020-01-24 PROCEDURE — 99207 ZZC DOWN CODE DUE TO SUBSEQUENT EXAM: CPT | Performed by: NURSE PRACTITIONER

## 2020-01-24 PROCEDURE — 99215 OFFICE O/P EST HI 40 MIN: CPT | Performed by: NURSE PRACTITIONER

## 2020-01-24 RX ORDER — GABAPENTIN 300 MG/1
CAPSULE ORAL
Qty: 120 CAPSULE | Refills: 0 | Status: SHIPPED | OUTPATIENT
Start: 2020-01-24 | End: 2020-02-18

## 2020-01-24 ASSESSMENT — PAIN SCALES - GENERAL: PAINLEVEL: NO PAIN (0)

## 2020-01-24 NOTE — PATIENT INSTRUCTIONS
PLAN:  1. Let me know if you would like to try hand therapy here in [place]  2. Let me know if you would like to try acupuncture with Dr. Galvez here in [place]  3. Medications:   1. Will leave Tramadol with Primary Care Provider for now. I don't have an issue with how you are using it. I do NOT recommend increasing the dosage.   2. Start gabapentin. Take 300mg at bedtime for 7 days, then take 300mg twice daily for 7 days, then take 300mg three times daily for 7 days, then take 300mg in the morning and afternoon and 600mg (2 capsules) at bedtime. If side effects, reduce to last tolerable dosage.   3. Start Voltaren gel 2 grams to left wrist up to 4 times daily. Best result with consistent use at least 2 times per day  4. Could try over-the-counter CBD balm. A couple of good sources are Lazarus or [place], found online.  I recommend using one that is third-party tested   4. Procedures: none  5. Referral to see sports medicine (Dr Caldwell or Dr. Morrison) for possible left wrist steroid injection  6. Follow-up with me in 4-6 weeks.       ----------------------------------------------------------------  Clinic Number:  [phone]     Call with any questions about your care and for scheduling assistance.     Calls are returned Monday through Friday between 8 AM and 4:30 PM. We usually get back to you within 2 business days depending on the issue/request.    If we are prescribing your medications:    For opioid medication refills, call the clinic or send a [place] [facility] message 7 days in advance.  Please include:    Name of requested medication    Name of the pharmacy.    For non-opioid medications, call your pharmacy directly to request a refill. Please allow 3-4 days to be processed.     Per [place] State Law:    All controlled substance prescriptions must be filled within 30 days of being written.      For those controlled substances allowing refills, pickup must occur within 30 days of last fill.      We believe regular  attendance is key to your success in our program!      Any time you are unable to keep your appointment we ask that you call us at least 24 hours in advance to cancel.This will allow us to offer the appointment time to another patient.     Multiple missed appointments may lead to dismissal from the clinic.

## 2020-01-24 NOTE — PROGRESS NOTES
"  [facility] Consultation    Date of visit: 1/24/2020    Reason for consultation:    Nataliia Flanagan is a 59 year old female who is seen in consultation today at the request of her provider, Meg Bernardo PA-C re: patient's chronic wrist pain s/p fracture      Primary Care Provider is Zoe Jensen.  Pain medications are being prescribed by Olinda Jensen CNP    Please see the [facility] health questionnaire which the patient completed and reviewed with me in detail.    Chief Complaint:  Left wrist pain  Chief Complaint   Patient presents with     Pain       Pain history: chronic left wrist pain following fracture in 2013  Nataliia Flanagan is a 59 year old female who first started having problems with pain as follows:     She slipped and fell and broke her left wrist in 2013. It was casted to re-align. No surgical repair needed.   She has had ongoing pain in the left wrist ever since the fracture    Pain rating: intensity ranges from 0/10 to 10/10, and Averages 3/10 on a 0-10 scale.    Describes pain as \"aching and sharp.\"  Pain is intermittent.    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 Heavy lifting involved as well    Relieving factors include: rest      Current pain-related medication treatments include:  -Excedrin PRN (helpful for migraines, occasional use)  -Tramadol 50mg Q 6 hours PRN (helpful)  -trazodone 100mg at bedtime prn (helpful)    Other pertinent medications:  -abilify 5mg every day (helpful)  -Prozac 40mg every day (helpful)    Previous medication treatments included:  OPIATES:Tramadol (helpful), Tylenol #3 (helpful after the initial fracture)  NSAIDS: cannot take due to history of 3 gastric ulcers  MUSCLE RELAXANTS: none  ANTI-MIGRAINE MEDS: none  ANTI-DEPRESSANTS: Prozac (helpful)  SLEEP AIDS: trazodone " (helpful)  ANTI-CONVULSANTS: none  TOPICALS: none  Other meds: Tylenol (not helpful at all)      Other treatments have included:  Nataliia Flanagan has not been seen at a pain clinic in the past.    PT: did this right after the healing of the fracture  Chiropractic care: none  Acupuncture: none  TENs Unit: none    Injections:   -years ago she has had 2 left wrist injections (helpful)  -left knee joint injection years ago (helpful)    Past Medical History:  Past Medical History:   Diagnosis Date     Depression     followed by PSYCH     Migraines      Past Surgical History:  Past Surgical History:   Procedure Laterality Date     ENDOSCOPY  12-9-14     ENT SURGERY      tonsilectomy     GENITOURINARY SURGERY      tubes tied     Medications:  Current Outpatient Medications   Medication Sig Dispense Refill     ARIPiprazole (ABILIFY) 5 MG tablet Take 1 tablet (5 mg) by mouth daily 90 tablet 1     Aspirin-Acetaminophen-Caffeine (EXCEDRIN PO) Take 2 tablets by mouth as needed       famotidine (PEPCID) 40 MG tablet TAKE 1 TABLET BY MOUTH EVERYDAY AT BEDTIME 90 tablet 3     FLUoxetine (PROZAC) 40 MG capsule Take 1 capsule (40 mg) by mouth daily 90 capsule 1     pseudoePHEDrine (EQL 12 HOUR DECONGESTANT) 120 MG 12 hr tablet TAKE ONE TABLET BY MOUTH EVERY TWELVE HOURS 60 tablet 0     traMADol (ULTRAM) 50 MG tablet Take 1 tablet (50 mg) by mouth every 6 hours as needed for severe pain 120 tablet 0     traZODone (DESYREL) 100 MG tablet TAKE 1 TABLET BY MOUTH EVERY DAY AT BEDTIME AS NEEDED FOR SLEEP 90 tablet 1     Allergies:   No Known Allergies  Social History:  Home situation: .  No children  Occupation/Schooling: / for pharmaceutical company   Tobacco use: none  Alcohol use: 2 glasses of mixed drink on the weekend  Drug use: none  History of chemical dependency treatment: none    Family history:  No family history on file.  Family history of headaches: sister has migraines    Review of Systems:  Skin:  negative  Eyes: negative  Ears/Nose/Throat: negative  Respiratory: No shortness of breath, dyspnea on exertion, cough, or hemoptysis  Cardiovascular: negative  Gastrointestinal: negative  Genitourinary: negative  Musculoskeletal: negative  Neurologic: negative  Psychiatric: depression  Hematologic/Lymphatic/Immunologic: negative  Endocrine: negative    Physical Exam:  Vitals:    01/24/20 1351   BP: (!) 140/84   Pulse: 91   Weight: 44.2 kg (97 lb 8 oz)     Exam:  Constitutional: healthy, alert and no distress  Head: normocephalic. Atraumatic.   Eyes: no redness or jaundice noted   ENT: oropharnx normal.  MMM.  Neck supple.    Cardiovascular: RRR no m/g/r   Respiratory: clear to auscultation A/P. Respirations easy and unlabored. Able to speak in full sentences without SOB or cough noted.    Gastrointestinal: soft, non-tender  : deferred  Skin: no suspicious lesions or rashes  Psychiatric: mentation appears normal and affect normal/bright    Musculoskeletal exam:  Gait/Station/Posture: normal gait. Fair posture.     Cervical spine:    Flex:  20 degrees   Ext: 20 degrees   Rotation to right: 90 degrees   Rotation to left: 90 degrees   Ext/rotation to the right pain free   Ext/rotation to the left pain free    Thoracic spine:  Normal     Lumbar spine:    ROM within normal limits    Myofascial tenderness:  none  Straight leg exam: N/A  Suman's maneuver: N/A    Neurologic exam:  CN:  Cranial nerves 2-12 are  Grossly normal  Motor:  5/5 UE and LE strength  Reflexes:     Biceps:     R:  2/4 L: 2/4   Brachioradialis   R:  2/4 L: 2/4      Patella:  R:  2/4 L: 2/4   Achilles:  R:  2/4 L: 2/4  Other reflexes:     Carson's normal  Sensory:  (upper and lower extremities):   Light touch: normal    Vibration: normal    Pin prick: normal    Allodynia: absent    Dysethesia: absent    Hyperalgesia: absent     Diagnostic tests:  None    Other testing (labs, diagnostics):  None recently    Screening tools:     DIRE Score for ongoing  opioid management is calculated as follows:    Diagnosis = 2    Intractability = 2    Risk: Psych = 2  Chem Hlth = 2  Reliability = 2  Social = 2    Efficacy = 2    Total DIRE Score = 14 (14 or higher predicts good candidate for ongoing opioid management; 13 or lower predicts poor candidate for opioid management)         Assessment:  1. Chronic left wrist pain  2. Post-traumatic osteoarthritis of the left wrist  3. PMHx includes: migraines, depression  4. PSHx includes: tubal ligation, tonsillectomy, endoscopy        Plan:  Diagnosis reviewed, treatment option addressed, and risk/benefits discussed.  Self-care instructions given.  I am recommending a multidisciplinary treatment plan to help this patient better manage her pain.      1. Physical Therapy: none  2. Patient is to contact me if she would like to begin hand therapy here at [place]  3. Clinical Health Psychologist to address issues of relaxation, behavioral change, coping style, and other factors important to improvement: none  4. Diagnostic Studies: none  5. Medication Management:   1. Will leave Tramadol with Primary Care Provider for now. I do not have an issue with how the patient is using it. I do NOT recommend increasing the dosage  2. Start gabapentin 300mg capsules: Take 300mg at bedtime for 7 days, then take 300mg twice daily for 7 days, then take 300mg three times daily for 7 days, then take 300mg in the morning and afternoon and 600mg (2 capsules) at bedtime. If side effects, reduce to last tolerable dosage.   3. Start Voltaren gel 1%, use 2 grams to left wrist up to QID PRN, best results with consistent use if helpful  4. Could try OTC CBD balm  6. Further procedures recommended: none  7. Acupuncture: patient will contact me if she would like referral to see Dr. Lenny FONSECA here in [place]  8. Urine toxicology screen today: none  9. Referral to see non-surgical ortho, either Dr. Caldwell or Dr. Morrison as patient may be candidate for ultrasound  guided left wrist joint steroid injection   10. Recommendations/follow-up for PCP:  See above  11. Release of information: none  12. Follow up: 4-6 weeks    Total time spent was 60 minutes, and more than 50% of face to face time was spent in counseling and/or coordination of care regarding principles of multidisciplinary care, medication management, and appropriate referrals.       Florinda ROBLERO, RN CNP, FNP  [facility]  [facility]

## 2020-01-30 ENCOUNTER — ANCILLARY PROCEDURE (OUTPATIENT)
Dept: GENERAL RADIOLOGY | Facility: CLINIC | Age: 60
End: 2020-01-30
Attending: FAMILY MEDICINE
Payer: COMMERCIAL

## 2020-01-30 ENCOUNTER — OFFICE VISIT (OUTPATIENT)
Dept: ORTHOPEDICS | Facility: CLINIC | Age: 60
End: 2020-01-30
Payer: COMMERCIAL

## 2020-01-30 VITALS — BODY MASS INDEX: 16.74 KG/M2 | SYSTOLIC BLOOD PRESSURE: 116 MMHG | DIASTOLIC BLOOD PRESSURE: 74 MMHG | WEIGHT: 97.5 LBS

## 2020-01-30 DIAGNOSIS — M25.532 LEFT WRIST PAIN: Primary | ICD-10-CM

## 2020-01-30 DIAGNOSIS — M25.532 LEFT WRIST PAIN: ICD-10-CM

## 2020-01-30 PROCEDURE — 73110 X-RAY EXAM OF WRIST: CPT | Mod: LT

## 2020-01-30 PROCEDURE — 20606 DRAIN/INJ JOINT/BURSA W/US: CPT | Mod: LT | Performed by: FAMILY MEDICINE

## 2020-01-30 PROCEDURE — 99244 OFF/OP CNSLTJ NEW/EST MOD 40: CPT | Mod: 25 | Performed by: FAMILY MEDICINE

## 2020-01-30 RX ORDER — ROPIVACAINE HYDROCHLORIDE 5 MG/ML
2 INJECTION, SOLUTION EPIDURAL; INFILTRATION; PERINEURAL
Status: DISCONTINUED | OUTPATIENT
Start: 2020-01-30 | End: 2024-04-16

## 2020-01-30 RX ORDER — BETAMETHASONE SODIUM PHOSPHATE AND BETAMETHASONE ACETATE 3; 3 MG/ML; MG/ML
6 INJECTION, SUSPENSION INTRA-ARTICULAR; INTRALESIONAL; INTRAMUSCULAR; SOFT TISSUE
Status: DISCONTINUED | OUTPATIENT
Start: 2020-01-30 | End: 2024-04-16

## 2020-01-30 RX ADMIN — BETAMETHASONE SODIUM PHOSPHATE AND BETAMETHASONE ACETATE 6 MG: 3; 3 INJECTION, SUSPENSION INTRA-ARTICULAR; INTRALESIONAL; INTRAMUSCULAR; SOFT TISSUE at 11:15

## 2020-01-30 RX ADMIN — ROPIVACAINE HYDROCHLORIDE 2 ML: 5 INJECTION, SOLUTION EPIDURAL; INFILTRATION; PERINEURAL at 11:15

## 2020-01-30 NOTE — PATIENT INSTRUCTIONS
Diagnosis: Left Wrist pain at the distal radial ulnar joint  Image Findings: mild wrist arthritis  Treatment: Steroid injection today, home exercises  Job: No restrictions  Medications: Limited tylenol/ibuprofen for pain for 1-2 weeks  Follow-up: As needed    It was great seeing you today!    Franky Morrison        [facility] Injection Discharge Instructions    Procedure: Left Wrist Distal Radial Ulnar Joint Steroid Injection      You may shower, however avoid swimming, tub baths or hot tubs for 24 hours following your procedure    You may have a mild to moderate increase in pain for several days following the injection.    It may take up to 14 days for the steroid medication to start working although you may feel the effect as early as a few days after the procedure.    You may use ice packs for 10-15 minutes, 3 to 4 times a day at the injection site for comfort    You may use anti-inflammatory medications (such as Ibuprofen or Aleve or Advil) or Tylenol for pain control if necessary    If you were fasting, you may resume your normal diet and medications after the procedure    If you have diabetes, check your blood sugar more frequently than usual as your blood sugar may be higher than normal for 10-14 days following a steroid injection. Contact your doctor who manages your diabetes if your blood sugar is higher than usual      If you experience any of the following, call [facility] @ [phone] or [phone]  -Fever over 100 degree F  -Swelling, bleeding, redness, drainage, warmth at the injection site  - New or worsening pain          [facility] Hand Strengthener    It was great seeing you today!    Franky Morrison

## 2020-01-30 NOTE — PROGRESS NOTES
ASSESSMENT & PLAN  Nataliia was seen today for pain.    Diagnoses and all orders for this visit:    Left wrist pain  -     XR Wrist Left G/E 3 Views; Future  -     Medium Joint Injection/Arthrocentesis: L distal radioulnar      Patient is a 59 year old female presenting for evaluation of   Chief Complaint   Patient presents with     Left Wrist - Pain      # Left Wrist Injury: Notable after a fall with a mildly angulated distal radius fracture with persisting pain afterwards.  Pain notably over DRUJ worse with resisted pronation/supination.  XR neg for significant arthrosis.  Likely pain coming from DRUJ.  Counseled patient on nature of condition and treatment options.  Given this plan as below, follow-up as needed  Treatment: Activities as tolerated  Physical Therapy HEP  Injection DRUJ steroid injection completed as below  Medications  Limited NSAIDs/Tylenol    Concerning signs/sx that would warrant urgent evaluation were discussed.  All questions were answered, patient understands and agrees with plan.      Return if symptoms worsen or fail to improve.    -----    SUBJECTIVE  Nataliia Flanagan is a/an 59 year old Right handed female who is seen in consultation at the request of  Florinda Roberts C.N.P. for evaluation of left wrist pain. The patient is seen by themselves.    Onset: 5 years(s) ago. Patient describes injury as fell and fractured wrist.    Location of Pain: left dorsal side of wrist   Rating of Pain at worst: 9/10  Rating of Pain Currently: 2/10  Worsened by: carrying tote at work, gripping, lifting, wrist brace   Better with: rest   Treatments tried: Tramadol, wrist brace   Associated symptoms: no distal numbness or tingling; denies swelling or warmth  Orthopedic history: YES - fractured distal radius 2015  Relevant surgical history: NO  Patient Social History: works at [employer]     Patient's past medical, surgical, social, and family histories were reviewed today and no changes  are noted.  Father has generalized arthritis none known in wrist    REVIEW OF SYSTEMS:  10 point ROS is negative other than symptoms noted above in HPI, Past Medical History or as stated below  Constitutional: NEGATIVE for fever, chills, change in weight  Skin: NEGATIVE for worrisome rashes, moles or lesions  GI/: NEGATIVE for bowel or bladder changes  Neuro: NEGATIVE for weakness, dizziness or paresthesias    OBJECTIVE:  /74   Wt 44.2 kg (97 lb 8 oz)   BMI 16.74 kg/m     General: healthy, alert and in no distress  HEENT: no scleral icterus or conjunctival erythema  Skin: no suspicious lesions or rash. No jaundice.  CV: regular rhythm by palpation  Resp: normal respiratory effort without conversational dyspnea   Psych: normal mood and affect  Gait: normal steady gait with appropriate coordination and balance  Neuro: Normal sensory exam of bilateral hands. Normal 2 pt discrimination.   MSK:  LEFT WRIST  Inspection:    No swelling or obvious deformity or asymmetry  Palpation:    Tender about the DRUJ. Remainder of bony and ligamentous line marks are nontender.     Metacarpals: normal    Thumb: normal    Fingers: normal  Range of Motion:    Full (active and passive) flexion, extension, pronation/supination, and ulnar/radial deviation.  Strength:    No deficits in flexion, extension, ulnar/radial deviation, or  strength.. Normal pinch strength.  Special Tests:    Positive: None    Negative: Phalen's, Tinel's (carpal tunnel), Tinel's (cubital tunnel), Finkelstein's, thenar eminence wasting, hypothenar eminence wasting.     Independent visualization of the below image:  No results found for this or any previous visit (from the past 24 hour(s)).    I  ordered, visualized and reviewed these images with the patient  No sig arthritis, mild dorsal angulation from previous distal radius fracture    Medium Joint Injection/Arthrocentesis: L distal radioulnar  Date/Time: 1/30/2020 11:15 AM  Performed by: Tamiko  Franky ARNOLD MD  Authorized by: Franky Morrison MD     Indications:  Pain  Needle Size:  25 G  Guidance: ultrasound    Approach:  Dorsal  Location:  Wrist  Site:  L distal radioulnar  Medications:  6 mg betamethasone acet & sod phos 6 (3-3) MG/ML; 2 mL ropivacaine 5 MG/ML  Outcome:  Tolerated well, no immediate complications  Procedure discussed: discussed risks, benefits, and alternatives    Consent Given by:  Patient  Timeout: timeout called immediately prior to procedure    Prep: patient was prepped and draped in usual sterile fashion     Patient reported significant improvement of pain after left DRUJ steroid injection.  Aftercare instructions given to patient.  Plan to follow-up as discussed above.     MD JAG[facility]IT [facility]          Patient's conditions were thoroughly discussed during today's visit with greater than 50% of the visit spent counseling the patient with total time spent face-to-face with the patient being 30 minutes.    MD SALAZAR [facility]

## 2020-01-30 NOTE — LETTER
1/30/2020         RE: Nataliia Flanagan  [address] [id number]        Dear Colleague,    Thank you for referring your patient, Nataliia Flanagan, to the [facility] [place]. Please see a copy of my visit note below.    ASSESSMENT & PLAN  Nataliia was seen today for pain.    Diagnoses and all orders for this visit:    Left wrist pain  -     XR Wrist Left G/E 3 Views; Future  -     Medium Joint Injection/Arthrocentesis: L distal radioulnar      Patient is a 59 year old female presenting for evaluation of   Chief Complaint   Patient presents with     Left Wrist - Pain      # Left Wrist Injury: Notable after a fall with a mildly angulated distal radius fracture with persisting pain afterwards.  Pain notably over DRUJ worse with resisted pronation/supination.  XR neg for significant arthrosis.  Likely pain coming from DRUJ.  Counseled patient on nature of condition and treatment options.  Given this plan as below, follow-up as needed  Treatment: Activities as tolerated  Physical Therapy HEP  Injection DRUJ steroid injection completed as below  Medications  Limited NSAIDs/Tylenol    Concerning signs/sx that would warrant urgent evaluation were discussed.  All questions were answered, patient understands and agrees with plan.      Return if symptoms worsen or fail to improve.    -----    SUBJECTIVE  Nataliia Flanagan is a/an 59 year old Right handed female who is seen in consultation at the request of  Florinda Roberts C.N.P. for evaluation of left wrist pain. The patient is seen by themselves.    Onset: 5 years(s) ago. Patient describes injury as fell and fractured wrist.    Location of Pain: left dorsal side of wrist   Rating of Pain at worst: 9/10  Rating of Pain Currently: 2/10  Worsened by: carrying tote at work, gripping, lifting, wrist brace   Better with: rest   Treatments tried: Tramadol, wrist brace   Associated symptoms: no distal numbness or tingling; denies  swelling or warmth  Orthopedic history: YES - fractured distal radius 2015  Relevant surgical history: NO  Patient Social History: works at [employer]     Patient's past medical, surgical, social, and family histories were reviewed today and no changes are noted.  Father has generalized arthritis none known in wrist    REVIEW OF SYSTEMS:  10 point ROS is negative other than symptoms noted above in HPI, Past Medical History or as stated below  Constitutional: NEGATIVE for fever, chills, change in weight  Skin: NEGATIVE for worrisome rashes, moles or lesions  GI/: NEGATIVE for bowel or bladder changes  Neuro: NEGATIVE for weakness, dizziness or paresthesias    OBJECTIVE:  /74   Wt 44.2 kg (97 lb 8 oz)   BMI 16.74 kg/m      General: healthy, alert and in no distress  HEENT: no scleral icterus or conjunctival erythema  Skin: no suspicious lesions or rash. No jaundice.  CV: regular rhythm by palpation  Resp: normal respiratory effort without conversational dyspnea   Psych: normal mood and affect  Gait: normal steady gait with appropriate coordination and balance  Neuro: Normal sensory exam of bilateral hands. Normal 2 pt discrimination.   MSK:  LEFT WRIST  Inspection:    No swelling or obvious deformity or asymmetry  Palpation:    Tender about the DRUJ. Remainder of bony and ligamentous line marks are nontender.     Metacarpals: normal    Thumb: normal    Fingers: normal  Range of Motion:    Full (active and passive) flexion, extension, pronation/supination, and ulnar/radial deviation.  Strength:    No deficits in flexion, extension, ulnar/radial deviation, or  strength.. Normal pinch strength.  Special Tests:    Positive: None    Negative: Phalen's, Tinel's (carpal tunnel), Tinel's (cubital tunnel), Finkelstein's, thenar eminence wasting, hypothenar eminence wasting.     Independent visualization of the below image:  No results found for this or any previous visit (from the past 24  hour(s)).    I  ordered, visualized and reviewed these images with the patient  No sig arthritis, mild dorsal angulation from previous distal radius fracture    Medium Joint Injection/Arthrocentesis: L distal radioulnar  Date/Time: 1/30/2020 11:15 AM  Performed by: Franky Morrison MD  Authorized by: Franky Morrison MD     Indications:  Pain  Needle Size:  25 G  Guidance: ultrasound    Approach:  Dorsal  Location:  Wrist  Site:  L distal radioulnar  Medications:  6 mg betamethasone acet & sod phos 6 (3-3) MG/ML; 2 mL ropivacaine 5 MG/ML  Outcome:  Tolerated well, no immediate complications  Procedure discussed: discussed risks, benefits, and alternatives    Consent Given by:  Patient  Timeout: timeout called immediately prior to procedure    Prep: patient was prepped and draped in usual sterile fashion     Patient reported significant improvement of pain after left DRUJ steroid injection.  Aftercare instructions given to patient.  Plan to follow-up as discussed above.     MD JAGJIT [facility]          Patient's conditions were thoroughly discussed during today's visit with greater than 50% of the visit spent counseling the patient with total time spent face-to-face with the patient being 30 minutes.    MD ROMMEL [facility]  [facility]        Again, thank you for allowing me to participate in the care of your patient.        Sincerely,        Franky Morrison MD

## 2020-01-31 DIAGNOSIS — M12.532 TRAUMATIC ARTHRITIS OF LEFT WRIST: ICD-10-CM

## 2020-02-03 RX ORDER — TRAMADOL HYDROCHLORIDE 50 MG/1
50 TABLET ORAL EVERY 6 HOURS PRN
Qty: 120 TABLET | Refills: 0 | Status: SHIPPED | OUTPATIENT
Start: 2020-02-03 | End: 2020-03-10

## 2020-02-03 NOTE — TELEPHONE ENCOUNTER
Requested Prescriptions   Pending Prescriptions Disp Refills     traMADol (ULTRAM) 50 MG tablet [Pharmacy Med Name: TRAMADOL HCL 50 MG TABLET] 120 tablet 0     Sig: TAKE 1 TABLET (50 MG) BY MOUTH EVERY 6 HOURS AS NEEDED FOR SEVERE PAIN       There is no refill protocol information for this order         Last Written Prescription Date:  12/10/19  Last Fill Quantity: 120,   # refills: 0  Last Office Visit: 12/10/19  Future Office visit:    Next 5 appointments (look out 90 days)    Mar 11, 2020  1:00 PM CDT  Return Visit with ANNIKA Samuel CNP  [facility] ([facility]) [address] [id number] [phone]           Routing refill request to provider for review/approval because:  Drug not on the [facility], [facility] or  Health refill protocol or controlled substance

## 2020-02-03 NOTE — TELEPHONE ENCOUNTER
Pain management note not completed, will refill x 1 but will need to review their plan.   Meg Bernardo PA-C

## 2020-02-14 ENCOUNTER — NURSE TRIAGE (OUTPATIENT)
Dept: FAMILY MEDICINE | Facility: CLINIC | Age: 60
End: 2020-02-14

## 2020-02-14 DIAGNOSIS — A09 DIARRHEA OF INFECTIOUS ORIGIN: Primary | ICD-10-CM

## 2020-02-14 RX ORDER — METRONIDAZOLE 500 MG/1
500 TABLET ORAL 3 TIMES DAILY
Qty: 30 TABLET | Refills: 0 | Status: SHIPPED | OUTPATIENT
Start: 2020-02-14 | End: 2020-03-11

## 2020-02-14 NOTE — TELEPHONE ENCOUNTER
Attempt # 1  Called patient at home [phone] (home)  Was call answered?  Yes, is in bathroom every 15 minutes with diarrhea - watery - no blood or melena, is able to drink fluids but then comes out as diarrhea. Skin turgor good per patient.   Has been taking Immodium A-D every day since Monday. Feels a little weak but not like going to fall over, drinking fluids and keeping them down. Does have nausea without emesis.   Nurse advised to drink 6 to 8 eight ounce glasses of water daily, Pedialyte or banana/potato, chips to help replace electrolytes,   Patient states has never had diarrhea this bad for this long before. No recent antibiotic use.    Pharmacy cued.       Has missed 3 days of work, has been this bad since Monday wont let up.   Denies:faintness,      Influenza-Like Illness (LUCY) Protocol    Nataliia RUBI Flanagan      Age: 59 year old     YOB: 1960    Please select the type of triage protocol you used for this patient? Epic Alejandra and Kaz or another form of electronic triage protocol was used.     Influenza-Like Illness (LUCY) Standing Order    Has the patient been seen by a primary care provider at an [facility] or [facility] within the past two years? Yes   .crcl  Do any of the following exclusions apply to the patient?  Does the patient have a history of CrCl less than or equal to 60 ml/min No   Is the patient taking Probenecid No   Was an Intranasal live attenuated influenza vaccine (LAIV) received by the patient 2 weeks before antiviral medication No   Was an LAIV received 48 hours after administration of influenza antiviral drugs, if planning on obtaining? No     Does this patient have ANY of the above exclusions answered Yes?  No.      Is this patient currently showing symptoms of Influenza like Illness (LUCY) after close proximity to someone with a verified diagnosis of Influenza, or is this patient not sick but has had  known exposure within less than or equal to 48 hours through close proximity with someone that has a verified diagnosis of Influenza?This patient is currently sick with LUCY type symptoms     Does this patient have ANY of the following specialty conditions?  Chemotherapy or radiation within the last 3 months No   Organ or bone marrow transplant No   Metabolic disorder No   HIV patient with CD4 count <200 No     Does this patient have ANY of the above specialty conditions? No     Have the symptoms of LUCY been present for less than or equal to 48 hours? No, the symptoms have been present for longer than 48 hours. Recommend a virtual visit such as an [facility] appointment age 1 to 65, or a telephone visit with the provider (LIP).  If virtual visit is not available, consider an in-office visit with provider based on same or next day access.              Additional educational resources include:    http://www.4tiitoo    http://www.cdc.gov/flu/  SHERRELL Dukes RN  [facility]      Additional Information    Negative: Severe difficulty breathing (e.g., struggling for each breath, speaks in single words)    Negative: Bluish (or gray) lips or face    Negative: Shock suspected (e.g., cold/pale/clammy skin, too weak to stand, low BP, rapid pulse)    Negative: Sounds like a life-threatening emergency to the triager    Negative: Sounds like a cold and there is no fever    Negative: Cough and there is no fever    Negative: Severe cough    Negative: Throat pain and there is no fever    Negative: Severe sore throat    Negative: Influenza vaccine reaction is suspected    Negative: Headache and stiff neck (can't touch chin to chest)    Negative: Chest pain (EXCEPTION: MILD central chest pain, present only when coughing)    Negative: Difficulty breathing that is not severe and not relieved by cleaning out the nose    Negative: Patient sounds very sick or weak to the triager     "Negative: Fever > 104 F (40 C)    Negative: Fever > 101 F (38.3 C) and over 60 years of age    Negative: Fever > 100.0 F (37.8 C) and diabetes mellitus or weak immune system (e.g., HIV positive, cancer chemo, splenectomy, organ transplant, chronic steroids)    Negative: Fever > 100.0 F (37.8 C) and bedridden (e.g., nursing home patient, stroke, chronic illness, recovering from surgery)    Negative: HIGH RISK (e.g., age > 64 years, pregnant, HIV+, chronic medical condition) and flu symptoms    Negative: Patient requests antiviral medicine for influenza and flu symptoms present < 48 hours    Negative: Nasal discharge present > 10 days    Negative: Cough present > 3 weeks    Answer Assessment - Initial Assessment Questions  1. WORST SYMPTOM: \"What is your worst symptom?\" (e.g., cough, runny nose, muscle aches, headache, sore throat, fever)       diarrhea  2. ONSET: \"When did your flu symptoms start?\"       5 days started Monday  3. COUGH: \"How bad is the cough?\"        No cough  4. RESPIRATORY DISTRESS: \"Describe your breathing.\"       No breathing issues  5. FEVER: \"Do you have a fever?\" If so, ask: \"What is your temperature, how was it measured, and when did it start?\"      May have fever but has not checked   6. EXPOSURE: \"Were you exposed to someone with influenza?\"        Yes,  in hospital for a week discharged a couple days ago influenza B  7. FLU VACCINE: \"Did you get a flu shot this year?\"      none3  8. HIGH RISK DISEASE: \"Do you any chronic medical problems?\" (e.g., heart or lung disease, asthma, weak immune system, or other HIGH RISK conditions)      none  9. PREGNANCY: \"Is there any chance you are pregnant?\" \"When was your last menstrual period?\"      None, LMP 10 years ago  10. OTHER SYMPTOMS: \"Do you have any other symptoms?\"  (e.g., runny nose, muscle aches, headache, sore throat)        Diarrhea, body aches,    Protocols used: INFLUENZA - SEASONAL-A-OH      "

## 2020-02-14 NOTE — TELEPHONE ENCOUNTER
Attempt # 1  Called patient at home number.  Was call answered?  Yes, relayed message from Dr. Farrell to patient, Patient verbalized understanding and agreement with plan and had no questions.   Nurse advised to go to ER if any signs of dehydration occurs.   Patient verbalized understanding and agreement with plan       Albania Hollis RN  [facility]

## 2020-02-14 NOTE — TELEPHONE ENCOUNTER
I will start flagyl   If no improvement, come in for evaluation    If unable to keep intake more than output, will need iv fluids

## 2020-02-14 NOTE — TELEPHONE ENCOUNTER
Reason for call:  Patient reporting a symptom    Symptom or request: flu symptoms     Duration (how long have symptoms been present): 4 days    Have you been treated for this before? No    Additional comments: Patient states she has had flu symptoms for 4 days. She is having diarrhea and is not sure what she can do. Please call for further assistance.     Phone Number patient can be reached at:  Home number on file [phone] (home)    Best Time:  anytime    Can we leave a detailed message on this number:  YES    Call taken on 2/14/2020 at 12:45 PM by Marcial Stroud

## 2020-02-16 ENCOUNTER — TRANSFERRED RECORDS (OUTPATIENT)
Dept: HEALTH INFORMATION MANAGEMENT | Facility: CLINIC | Age: 60
End: 2020-02-16

## 2020-02-17 DIAGNOSIS — M25.532 CHRONIC PAIN OF LEFT WRIST: ICD-10-CM

## 2020-02-17 DIAGNOSIS — M19.132 POST-TRAUMATIC OSTEOARTHRITIS OF LEFT WRIST: ICD-10-CM

## 2020-02-17 DIAGNOSIS — G89.29 CHRONIC PAIN OF LEFT WRIST: ICD-10-CM

## 2020-02-17 NOTE — TELEPHONE ENCOUNTER
Received fax request from [facility] pharmacy requesting refill(s) for gabapentin (NEURONTIN) 300 MG capsule    Last refilled on 01/25/20    Pt last seen on 01/24/20  Next appt scheduled for 03/11/20    Will facilitate refill.

## 2020-02-18 RX ORDER — GABAPENTIN 300 MG/1
CAPSULE ORAL
Qty: 120 CAPSULE | Refills: 0 | Status: SHIPPED | OUTPATIENT
Start: 2020-02-18 | End: 2020-03-11

## 2020-02-18 NOTE — TELEPHONE ENCOUNTER
Signed Prescriptions:                        Disp   Refills    gabapentin (NEURONTIN) 300 MG capsule      120 ca*0        Sig: take 300mg in the morning and afternoon and 600mg (2           capsules) at bedtime. If side effects, reduce to           last tolerable dosage.  Authorizing Provider: FENG ROSEN, RN CNP, FNP  [facility]  [facility]

## 2020-02-24 ENCOUNTER — HEALTH MAINTENANCE LETTER (OUTPATIENT)
Age: 60
End: 2020-02-24

## 2020-03-02 ENCOUNTER — TRANSFERRED RECORDS (OUTPATIENT)
Dept: HEALTH INFORMATION MANAGEMENT | Facility: CLINIC | Age: 60
End: 2020-03-02

## 2020-03-04 ENCOUNTER — OFFICE VISIT (OUTPATIENT)
Dept: FAMILY MEDICINE | Facility: CLINIC | Age: 60
End: 2020-03-04
Payer: COMMERCIAL

## 2020-03-04 VITALS
DIASTOLIC BLOOD PRESSURE: 77 MMHG | SYSTOLIC BLOOD PRESSURE: 120 MMHG | TEMPERATURE: 97.5 F | OXYGEN SATURATION: 100 % | HEIGHT: 63 IN | BODY MASS INDEX: 16.83 KG/M2 | WEIGHT: 95 LBS | HEART RATE: 73 BPM

## 2020-03-04 DIAGNOSIS — R19.7 DIARRHEA, UNSPECIFIED TYPE: Primary | ICD-10-CM

## 2020-03-04 DIAGNOSIS — Z23 NEED FOR PROPHYLACTIC VACCINATION AND INOCULATION AGAINST INFLUENZA: ICD-10-CM

## 2020-03-04 PROCEDURE — 90682 RIV4 VACC RECOMBINANT DNA IM: CPT | Performed by: FAMILY MEDICINE

## 2020-03-04 PROCEDURE — 90471 IMMUNIZATION ADMIN: CPT | Performed by: FAMILY MEDICINE

## 2020-03-04 PROCEDURE — 99213 OFFICE O/P EST LOW 20 MIN: CPT | Mod: 25 | Performed by: FAMILY MEDICINE

## 2020-03-04 RX ORDER — CIPROFLOXACIN 500 MG/1
500 TABLET, FILM COATED ORAL 2 TIMES DAILY
Qty: 10 TABLET | Refills: 0 | Status: SHIPPED | OUTPATIENT
Start: 2020-03-04 | End: 2020-03-11

## 2020-03-04 ASSESSMENT — PAIN SCALES - GENERAL: PAINLEVEL: NO PAIN (0)

## 2020-03-04 ASSESSMENT — MIFFLIN-ST. JEOR: SCORE: 975.05

## 2020-03-04 NOTE — PATIENT INSTRUCTIONS
Patient Education     Diarrhea with Uncertain Cause (Adult)    Diarrhea is when stools are loose and watery. This can be caused by:    Viral infections    Bacterial infections    Food poisoning    Parasites    Irritable bowel syndrome (IBS)    Inflammatory bowel diseases such as ulcerative colitis, Crohn's disease, and celiac disease    Food intolerance, such as to lactose, the sugar found in milk and milk products    Reaction to medicines like antibiotics, laxatives, cancer drugs, and antacids  Along with diarrhea, you may also have:    Abdominal pain and cramping    Nausea and vomiting    Loss of bowel control    Fever and chills    Bloody stools  In some cases, antibiotics may help to treat diarrhea. You may have a stool sample test. This is done to see what is causing your diarrhea, and if antibiotics will help treat it. The results of a stool sample test may take up to 2 days. The healthcare provider may not give you antibiotics until he or she has the stool test results.  Diarrhea can cause dehydration. This is the loss of too much water and other fluids from the body. When this occurs, body fluid must be replaced. This can be done with oral rehydration solutions. Oral rehydration solutions are available at drugstores and grocery stores without a prescription. Sports drinks are not the best choice if you are very dehydrated. They have too much sugar and not enough electrolytes.  Home care  Follow all instructions given by your healthcare provider. Rest at home for the next 24 hours, or until you feel better. Avoid caffeine, tobacco, and alcohol. These can make diarrhea, cramping, and pain worse.  If taking medicines:    Over-the-counter nausea and diarrhea medicines are generally OK unless you experience fever or blood stool. Check with your doctor first in those circumstances.    You may use acetaminophen or NSAID medicines like ibuprofen or naproxen to reduce pain and fever. Don t use these if you have  chronic liver or kidney disease, or ever had a stomach ulcer or gastrointestinal bleeding. Don't use NSAID medicines if you are already taking one for another condition (like arthritis) or are on daily aspirin therapy (such as for heart disease or after a stroke). Talk with your healthcare provider first.    If antibiotics were prescribed, be sure you take them until they are finished. Don t stop taking them even when you feel better. Antibiotics must be taken as a full course.  To prevent the spread of illness:    Remember that washing with soap and water and using alcohol-based  is the best way to prevent the spread of infection. Dry your hands with a single use towel (like a paper towel).    Clean the toilet after each use.    Wash your hands before eating.    Wash your hands before and after preparing food. Keep in mind that people with diarrhea or vomiting should not prepare food for others.    Wash your hands after using cutting boards, countertops, and knives that have been in contact with raw foods.    Wash and then peel fruits and vegetables.    Keep uncooked meats away from cooked and ready-to-eat foods.    Use a food thermometer when cooking. Cook poultry to at least 165 F (74 C). Cook ground meat (beef, veal, pork, lamb) to at least 160 F (71 C). Cook fresh beef, veal, lamb, and pork to at least 145 F (63 C).    Don t eat raw or undercooked eggs (poached or lucy side up), poultry, meat, or unpasteurized milk and juices.  Food and drinks  The main goal while treating vomiting or diarrhea is to prevent dehydration. This is done by taking small amounts of liquids often.    Keep in mind that liquids are more important than food right now.    Drink only small amounts of liquids at a time.    Don t force yourself to eat, especially if you are having cramping, vomiting, or diarrhea. Don t eat large amounts at a time, even if you are hungry.    If you eat, avoid fatty, greasy, spicy, or fried  foods.    Don t eat dairy foods or drink milk if you have diarrhea. These can make diarrhea worse.  During the first 24 hours you can try:    Oral rehydration solutions.  Sports drinks may be used if you are not too dehydrated and are otherwise healthy.    Soft drinks without caffeine    Ginger ale    Water (plain or flavored)    Decaf tea or coffee    Clear broth, consommé, or bouillon    Gelatin, popsicles, or frozen fruit juice bars  The second 24 hours, if you are feeling better, you can add:    Hot cereal, plain toast, bread, rolls, or crackers    Plain noodles, rice, mashed potatoes, chicken noodle soup, or rice soup    Unsweetened canned fruit (no pineapple)    Bananas  As you recover:    Limit fat intake to less than 15 grams per day. Don t eat margarine, butter, oils, mayonnaise, sauces, gravies, fried foods, peanut butter, meat, poultry, or fish.    Limit fiber. Don t eat raw or cooked vegetables, fresh fruits except bananas, or bran cereals.    Limit caffeine and chocolate.    Limit dairy.    Don t use spices or seasonings except salt.    Go back to your normal diet over time, as you feel better and your symptoms improve.    If the symptoms come back, go back to a simple diet or clear liquids.  Follow-up care  Follow up with your healthcare provider, or as advised. If a stool sample was taken or cultures were done, call the healthcare provider for the results as instructed.  Call 911  Call 911 if you have any of these symptoms:    Trouble breathing    Confusion    Extreme drowsiness or trouble walking    Loss of consciousness    Rapid heart rate    Chest pain    Stiff neck    Seizure  When to seek medical advice  Call your healthcare provider right away if any of these occur:    Abdominal pain that gets worse    Constant lower right abdominal pain    Continued vomiting and inability to keep liquids down    Diarrhea more than 5 times a day    Blood in vomit or stool    Dark urine or no urine for 8 hours,  dry mouth and tongue, tiredness, weakness, or dizziness    Drowsiness    New rash    You don t get better in 2 to 3 days    Fever of 100.4 F (38 C) or higher, or as directed by your healthcare provider  Date Last Reviewed: 6/1/2018 2000-2019 [employer]. [address] [place] [id number]. All rights reserved. This information is not intended as a substitute for professional medical care. Always follow your healthcare professional's instructions.

## 2020-03-04 NOTE — PROGRESS NOTES
"Subjective     Nataliia Flanagan is a 59 year old female who presents to clinic today for the following health issues:    HPI :  Patient reports been having watery diarrhea for the past 2 weeks after she ate Salad.  She did have a stool study from February 16, 2020 which was negative.  She reports the diarrhea was watery initially it  \" was 20 times per day \", that where she went to the emergency room and they tested her and she had negative stool study.  She was given metronidazole initially in which she reports was not able to finish  She had a leftover ciprofloxacin which she took for 10 days.  She reports that cleared her diarrhea however, after she quit taking after she finished with the medication she started having the diarrhea again.    Now she is having 4-5 episodes of watery, runny stool.  With no mucus, no blood.  She has no fever, denies abdominal pain.  Denies nausea or vomiting.  Has no previous history of travel no other family member has similar problem  Diarrhea  Onset: three weeks    Description:   Consistency of stool: runny and loose  Blood in stool: no   Number of loose stools in past 24 hours: 1    Progression of Symptoms:  worsening and constant    Accompanying Signs & Symptoms:  Fever: no   Nausea or vomiting; no   Abdominal pain: no   Episodes of constipation: no   Weight loss: no     History:   Ill contacts: no   Recent use of antibiotics: YES- Cipro because of diarrhea   Recent travels: no          Recent medication-new or changes(Rx or OTC): no     Precipitating factors:   Had a deli salad    Alleviating factors:   none    Therapies Tried and outcome:  Imodium AD and Ciprofloxacin; Outcome: seemed to help, but symptoms returned      Patient Active Problem List   Diagnosis     Chronic rhinitis     CARDIOVASCULAR SCREENING; LDL GOAL LESS THAN 160     Malnutrition (H)     Iron deficiency anemia     Distal radius fracture     Joint inflammation of wrist - left     Gastroesophageal reflux " disease without esophagitis     Major depressive disorder, recurrent episode, moderate (H)     Traumatic arthritis of left wrist     Chronic pain syndrome     Past Surgical History:   Procedure Laterality Date     ENDOSCOPY  12-9-14     ENT SURGERY      tonsilectomy     GENITOURINARY SURGERY      tubes tied       Social History     Tobacco Use     Smoking status: Never Smoker     Smokeless tobacco: Never Used   Substance Use Topics     Alcohol use: No     History reviewed. No pertinent family history.      Current Outpatient Medications   Medication Sig Dispense Refill     ARIPiprazole (ABILIFY) 5 MG tablet Take 1 tablet (5 mg) by mouth daily 90 tablet 1     Aspirin-Acetaminophen-Caffeine (EXCEDRIN PO) Take 2 tablets by mouth as needed       ciprofloxacin (CIPRO) 500 MG tablet Take 1 tablet (500 mg) by mouth 2 times daily for 5 days 10 tablet 0     diclofenac (VOLTAREN) 1 % topical gel Apply 2 g topically 4 times daily as needed for moderate pain 100 g 1     famotidine (PEPCID) 40 MG tablet TAKE 1 TABLET BY MOUTH EVERYDAY AT BEDTIME 90 tablet 3     FLUoxetine (PROZAC) 40 MG capsule Take 1 capsule (40 mg) by mouth daily 90 capsule 1     gabapentin (NEURONTIN) 300 MG capsule take 300mg in the morning and afternoon and 600mg (2 capsules) at bedtime. If side effects, reduce to last tolerable dosage. 120 capsule 0     pseudoePHEDrine (EQL 12 HOUR DECONGESTANT) 120 MG 12 hr tablet TAKE ONE TABLET BY MOUTH EVERY TWELVE HOURS **NEED APPOINTMENT FOR FURTHER REFILLS** 60 tablet 0     traMADol (ULTRAM) 50 MG tablet TAKE 1 TABLET (50 MG) BY MOUTH EVERY 6 HOURS AS NEEDED FOR SEVERE PAIN 120 tablet 0     traZODone (DESYREL) 100 MG tablet TAKE 1 TABLET BY MOUTH EVERY DAY AT BEDTIME AS NEEDED FOR SLEEP 90 tablet 1       Reviewed and updated as needed this visit by Provider         Review of Systems   ROS COMP: Constitutional, HEENT, cardiovascular, pulmonary, gi and gu systems are negative, except as otherwise noted.      Objective     There were no vitals taken for this visit.  There is no height or weight on file to calculate BMI.  Physical Exam   GENERAL: healthy, alert and no distress  ABDOMEN: soft, nontender, no hepatosplenomegaly, no masses and bowel sounds normal  MS: no gross musculoskeletal defects noted, no edema  BACK: no CVA tenderness, no paralumbar tenderness    Diagnostic Test Results:  Labs reviewed in Epic  none         Assessment & Plan       ICD-10-CM    1. Diarrhea, unspecified type R19.7 ciprofloxacin (CIPRO) 500 MG tablet   2. Need for prophylactic vaccination and inoculation against influenza Z23 INFLUENZA QUAD, RECOMBINANT, P-FREE (RIV4) (FLUBLOCK) [06190]     Vaccine Administration, Initial [49462]     Patient reports she continued to have diarrhea.  Her previous stool study was negative.  Currently,  she denies any fever, has no abdominal pain, no nausea or vomiting.  No blood in the stool.  She reports that she did improve when she took ciprofloxacin previously.    I did discuss with the patient diet modification.  Avoid greasy food, avoid milk or dairy products.  Cut down the soda.  Eat more soft clear liquid.  And eat more solid food.    In addition,  start taking probiotic 1 capsule twice daily for the next 2 weeks.  She may use Imodium as well.  I gave her another 5 days of ciprofloxacin    Patient Instructions     Patient Education     Diarrhea with Uncertain Cause (Adult)    Diarrhea is when stools are loose and watery. This can be caused by:    Viral infections    Bacterial infections    Food poisoning    Parasites    Irritable bowel syndrome (IBS)    Inflammatory bowel diseases such as ulcerative colitis, Crohn's disease, and celiac disease    Food intolerance, such as to lactose, the sugar found in milk and milk products    Reaction to medicines like antibiotics, laxatives, cancer drugs, and antacids  Along with diarrhea, you may also have:    Abdominal pain and cramping    Nausea and vomiting    Loss of  bowel control    Fever and chills    Bloody stools  In some cases, antibiotics may help to treat diarrhea. You may have a stool sample test. This is done to see what is causing your diarrhea, and if antibiotics will help treat it. The results of a stool sample test may take up to 2 days. The healthcare provider may not give you antibiotics until he or she has the stool test results.  Diarrhea can cause dehydration. This is the loss of too much water and other fluids from the body. When this occurs, body fluid must be replaced. This can be done with oral rehydration solutions. Oral rehydration solutions are available at drugstores and grocery stores without a prescription. Sports drinks are not the best choice if you are very dehydrated. They have too much sugar and not enough electrolytes.  Home care  Follow all instructions given by your healthcare provider. Rest at home for the next 24 hours, or until you feel better. Avoid caffeine, tobacco, and alcohol. These can make diarrhea, cramping, and pain worse.  If taking medicines:    Over-the-counter nausea and diarrhea medicines are generally OK unless you experience fever or blood stool. Check with your doctor first in those circumstances.    You may use acetaminophen or NSAID medicines like ibuprofen or naproxen to reduce pain and fever. Don t use these if you have chronic liver or kidney disease, or ever had a stomach ulcer or gastrointestinal bleeding. Don't use NSAID medicines if you are already taking one for another condition (like arthritis) or are on daily aspirin therapy (such as for heart disease or after a stroke). Talk with your healthcare provider first.    If antibiotics were prescribed, be sure you take them until they are finished. Don t stop taking them even when you feel better. Antibiotics must be taken as a full course.  To prevent the spread of illness:    Remember that washing with soap and water and using alcohol-based  is the best  way to prevent the spread of infection. Dry your hands with a single use towel (like a paper towel).    Clean the toilet after each use.    Wash your hands before eating.    Wash your hands before and after preparing food. Keep in mind that people with diarrhea or vomiting should not prepare food for others.    Wash your hands after using cutting boards, countertops, and knives that have been in contact with raw foods.    Wash and then peel fruits and vegetables.    Keep uncooked meats away from cooked and ready-to-eat foods.    Use a food thermometer when cooking. Cook poultry to at least 165 F (74 C). Cook ground meat (beef, veal, pork, lamb) to at least 160 F (71 C). Cook fresh beef, veal, lamb, and pork to at least 145 F (63 C).    Don t eat raw or undercooked eggs (poached or lucy side up), poultry, meat, or unpasteurized milk and juices.  Food and drinks  The main goal while treating vomiting or diarrhea is to prevent dehydration. This is done by taking small amounts of liquids often.    Keep in mind that liquids are more important than food right now.    Drink only small amounts of liquids at a time.    Don t force yourself to eat, especially if you are having cramping, vomiting, or diarrhea. Don t eat large amounts at a time, even if you are hungry.    If you eat, avoid fatty, greasy, spicy, or fried foods.    Don t eat dairy foods or drink milk if you have diarrhea. These can make diarrhea worse.  During the first 24 hours you can try:    Oral rehydration solutions.  Sports drinks may be used if you are not too dehydrated and are otherwise healthy.    Soft drinks without caffeine    Ginger ale    Water (plain or flavored)    Decaf tea or coffee    Clear broth, consommé, or bouillon    Gelatin, popsicles, or frozen fruit juice bars  The second 24 hours, if you are feeling better, you can add:    Hot cereal, plain toast, bread, rolls, or crackers    Plain noodles, rice, mashed potatoes, chicken noodle soup,  or rice soup    Unsweetened canned fruit (no pineapple)    Bananas  As you recover:    Limit fat intake to less than 15 grams per day. Don t eat margarine, butter, oils, mayonnaise, sauces, gravies, fried foods, peanut butter, meat, poultry, or fish.    Limit fiber. Don t eat raw or cooked vegetables, fresh fruits except bananas, or bran cereals.    Limit caffeine and chocolate.    Limit dairy.    Don t use spices or seasonings except salt.    Go back to your normal diet over time, as you feel better and your symptoms improve.    If the symptoms come back, go back to a simple diet or clear liquids.  Follow-up care  Follow up with your healthcare provider, or as advised. If a stool sample was taken or cultures were done, call the healthcare provider for the results as instructed.  Call 911  Call 911 if you have any of these symptoms:    Trouble breathing    Confusion    Extreme drowsiness or trouble walking    Loss of consciousness    Rapid heart rate    Chest pain    Stiff neck    Seizure  When to seek medical advice  Call your healthcare provider right away if any of these occur:    Abdominal pain that gets worse    Constant lower right abdominal pain    Continued vomiting and inability to keep liquids down    Diarrhea more than 5 times a day    Blood in vomit or stool    Dark urine or no urine for 8 hours, dry mouth and tongue, tiredness, weakness, or dizziness    Drowsiness    New rash    You don t get better in 2 to 3 days    Fever of 100.4 F (38 C) or higher, or as directed by your healthcare provider  Date Last Reviewed: 6/1/2018 2000-2019 [employer]. [address] [place] [address], [place] [address]. All rights reserved. This information is not intended as a substitute for professional medical care. Always follow your healthcare professional's instructions.               Return in about 3 months (around 6/4/2020) for Physical Exam.    Dino Wright MD  [facility]

## 2020-03-10 DIAGNOSIS — M12.532 TRAUMATIC ARTHRITIS OF LEFT WRIST: ICD-10-CM

## 2020-03-10 RX ORDER — TRAMADOL HYDROCHLORIDE 50 MG/1
50 TABLET ORAL EVERY 6 HOURS PRN
Qty: 120 TABLET | Refills: 0 | Status: SHIPPED | OUTPATIENT
Start: 2020-03-10 | End: 2020-04-07

## 2020-03-10 NOTE — TELEPHONE ENCOUNTER
Requested Prescriptions   Pending Prescriptions Disp Refills     traMADol (ULTRAM) 50 MG tablet 120 tablet 0     Sig: Take 1 tablet (50 mg) by mouth every 6 hours as needed for severe pain       There is no refill protocol information for this order         Last Written Prescription Date:  2/3/20  Last Fill Quantity: 120,   # refills: 0  Last Office Visit: 12/10/19  Future Office visit:    Next 5 appointments (look out 90 days)    Mar 11, 2020  1:00 PM CDT  Return Visit with ANNIKA Samuel CNP  [facility] [place] ([facility] [id number] [address] [address]  [place] [address]  [phone]           Routing refill request to provider for review/approval because:  Drug not on the [facility], [facility] or [facility] refill protocol or controlled substance     \

## 2020-03-11 ENCOUNTER — OFFICE VISIT (OUTPATIENT)
Dept: PALLIATIVE MEDICINE | Facility: CLINIC | Age: 60
End: 2020-03-11
Payer: COMMERCIAL

## 2020-03-11 VITALS
HEART RATE: 80 BPM | SYSTOLIC BLOOD PRESSURE: 148 MMHG | WEIGHT: 96 LBS | BODY MASS INDEX: 17.01 KG/M2 | DIASTOLIC BLOOD PRESSURE: 87 MMHG

## 2020-03-11 DIAGNOSIS — M25.532 CHRONIC PAIN OF LEFT WRIST: ICD-10-CM

## 2020-03-11 DIAGNOSIS — G89.29 CHRONIC PAIN OF LEFT WRIST: ICD-10-CM

## 2020-03-11 DIAGNOSIS — M19.132 POST-TRAUMATIC OSTEOARTHRITIS OF LEFT WRIST: ICD-10-CM

## 2020-03-11 PROCEDURE — 99213 OFFICE O/P EST LOW 20 MIN: CPT | Performed by: NURSE PRACTITIONER

## 2020-03-11 RX ORDER — GABAPENTIN 300 MG/1
CAPSULE ORAL
Qty: 120 CAPSULE | Refills: 0 | Status: SHIPPED | OUTPATIENT
Start: 2020-03-11 | End: 2020-04-13

## 2020-03-11 ASSESSMENT — PAIN SCALES - GENERAL: PAINLEVEL: NO PAIN (0)

## 2020-03-11 NOTE — PATIENT INSTRUCTIONS
PLAN  1. Medications:   1. Continue Gabapentin 300mg in the morning and afternoon and 600mg at night. Send me a "[employer]t message in a month  if you want to taper down or off of gabapentin.  2. Continue to use Voltaren gel four times daily as needed.  2. Procedures: none at present   3. Nataliia to follow up with Primary Care provider regarding elevated blood pressure.  4. Follow-up with me in 8-12 weeks        ----------------------------------------------------------------  Clinic Number:  [phone]     Call with any questions about your care and for scheduling assistance.     Calls are returned Monday through Friday between 8 AM and 4:30 PM. We usually get back to you within 2 business days depending on the issue/request.    If we are prescribing your medications:    For opioid medication refills, call the clinic or send a [employer] message 7 days in advance.  Please include:    Name of requested medication    Name of the pharmacy.    For non-opioid medications, call your pharmacy directly to request a refill. Please allow 3-4 days to be processed.     Per [place] State Law:    All controlled substance prescriptions must be filled within 30 days of being written.      For those controlled substances allowing refills, pickup must occur within 30 days of last fill.      We believe regular attendance is key to your success in our program!      Any time you are unable to keep your appointment we ask that you call us at least 24 hours in advance to cancel.This will allow us to offer the appointment time to another patient.     Multiple missed appointments may lead to dismissal from the clinic.

## 2020-03-11 NOTE — PROGRESS NOTES
[facility]    3/11/2020    Chief complaint: Left wrist pain    Interval history:  Nataliia Flanagan is a 59 year old female is known to me for Chronic left wrist pain  Post-traumatic osteoarthritis of the left wrist    Recommendations/plan at the last visit on 1/24/2020 included:  1. Physical Therapy: none  2. Patient is to contact me if she would like to begin hand therapy here at [place]  3. Clinical Health Psychologist to address issues of relaxation, behavioral change, coping style, and other factors important to improvement: none  4. Diagnostic Studies: none  5. Medication Management:   1. Will leave Tramadol with Primary Care Provider for now. I do not have an issue with how the patient is using it. I do NOT recommend increasing the dosage  2. Start gabapentin 300mg capsules: Take 300mg at bedtime for 7 days, then take 300mg twice daily for 7 days, then take 300mg three times daily for 7 days, then take 300mg in the morning and afternoon and 600mg (2 capsules) at bedtime. If side effects, reduce to last tolerable dosage.   3. Start Voltaren gel 1%, use 2 grams to left wrist up to QID PRN, best results with consistent use if helpful  4. Could try OTC CBD balm  6. Further procedures recommended: none  7. Acupuncture: patient will contact me if she would like referral to see Dr. Lenny FONSECA here in [place]  8. Urine toxicology screen today: none  9. Referral to see non-surgical ortho, either Dr. Caldwell or Dr. Morrison as patient may be candidate for ultrasound guided left wrist joint steroid injection   10. Recommendations/follow-up for PCP:  See above  11. Release of information: none  12. Follow up: 4-6 weeks    Since her last visit, Nataliia Flanagan reports:  -Overall her left wrist pain has improved with the use of Voltaren gel. Left wrist pain is only aggravated when she lifts heavy things. Feels that her pain is under good control with the Voltaren gel and gabapentin. Discussed leaving  gabapentin where it is for another month, and if still feeling well, we can consider tapering down or off of gabapentin.   -Was seen in the ED on 2/16/2020 for gastritis.   -Gabapentin caused some fogginess. The patient will consider tapering off of gabapentin in a month or so.         At this point, the patient's participation with our multidisciplinary team includes:  The patient has been compliant with the program.  Pain Group - none at present  PT - none at present  Health Psych - none at present      Pain scores:  Pain intensity on average is 0 on a scale of 0-10.    Range is 0-3/10.   Pain right now is 0/10.  Pain is described as aching.    Pain is intermittent in nature    Current pain-related medication treatments include:  -Excedrin PRN (helpful for migraines, occasional use)  -Tramadol 50mg Q 6 hours PRN (helpful)  -trazodone 100mg at bedtime prn (helpful)  -Voltaren gel (veryt helpful)  -Gabapentin 300/300/600mg (somewhat helpful)     Other pertinent medications:   -abilify 5mg every day (helpful)  -Prozac 40mg every day (helpful)     Previous medication treatments included:   OPIATES:Tramadol (helpful), Tylenol #3 (helpful after the initial fracture)  NSAIDS: cannot take due to history of 3 gastric ulcers  MUSCLE RELAXANTS: none  ANTI-MIGRAINE MEDS: none  ANTI-DEPRESSANTS: Prozac (helpful)  SLEEP AIDS: trazodone (helpful)  ANTI-CONVULSANTS: gabapentin (helpful)  TOPICALS: Voltaren gel (very helpful for wrist pain)  Other meds: Tylenol (not helpful at all)        Other treatments have included:  Nataliia Flanagan has not been seen at a pain clinic in the past.    PT: did this right after the healing of the fracture  Chiropractic care: none  Acupuncture: none  TENs Unit: none     Injections:    -years ago she has had 2 left wrist injections (helpful)  -left knee joint injection years ago (helpful)    Side Effects: no side effect  Patient is using the medication as prescribed: YES    Medications:  Current  Outpatient Medications   Medication Sig Dispense Refill     ARIPiprazole (ABILIFY) 5 MG tablet Take 1 tablet (5 mg) by mouth daily 90 tablet 1     Aspirin-Acetaminophen-Caffeine (EXCEDRIN PO) Take 2 tablets by mouth as needed       diclofenac (VOLTAREN) 1 % topical gel Apply 2 g topically 4 times daily as needed for moderate pain 100 g 1     famotidine (PEPCID) 40 MG tablet TAKE 1 TABLET BY MOUTH EVERYDAY AT BEDTIME 90 tablet 3     FLUoxetine (PROZAC) 40 MG capsule Take 1 capsule (40 mg) by mouth daily 90 capsule 1     gabapentin (NEURONTIN) 300 MG capsule take 300mg in the morning and afternoon and 600mg (2 capsules) at bedtime. If side effects, reduce to last tolerable dosage. 120 capsule 0     pseudoePHEDrine (EQL 12 HOUR DECONGESTANT) 120 MG 12 hr tablet TAKE ONE TABLET BY MOUTH EVERY TWELVE HOURS **NEED APPOINTMENT FOR FURTHER REFILLS** 60 tablet 0     traMADol (ULTRAM) 50 MG tablet Take 1 tablet (50 mg) by mouth every 6 hours as needed for severe pain 120 tablet 0     traZODone (DESYREL) 100 MG tablet TAKE 1 TABLET BY MOUTH EVERY DAY AT BEDTIME AS NEEDED FOR SLEEP 90 tablet 1       Medical History: any changes in medical history since they were last seen? No    Social History:   Home situation: .  No children  Occupation/Schooling: / for pharmaceutical company   Tobacco use: none  Alcohol use: 2 glasses of mixed drink on the weekend  Drug use: none  History of chemical dependency treatment: none        Review of Systems:  ROS is positive as per HPI as well as for nothing and is negative for fevers, chills, sweats, or constipation, diarrhea.    This document serves as a record of the services and decisions personally performed and made by Florinda ROBLERO. It was created on her behalf by Inocencio Espinal, a trained medical scribe. The creation of this document is based on the provider's statements to the medical scribe.  Inocencio Espinal 1:08 PM March 11, 2020    Physical Exam:  Vital signs: Blood  pressure (!) 148/87, pulse 80, weight 43.5 kg (96 lb), not currently breastfeeding.    Behavioral observations:  Awake, alert, and cooperative    Gait:  Normal gait    Musculoskeletal exam:  Strength grossly equal throughout    Neuro exam:  SILT in all extremities     Skin/vascular/autonomic:  No suspicious lesions on exposed skin    Other:  none      [place] Pres[place]:  [place]ed [place]  March 11, 2020- no concerning fills.  Florinda ROBLERO, RN CNP, FNP  [facility]  [facility] [place]        DIRE Score for selecting candidates for long term opioid analgesia for chronic pain:  Diagnosis  2  Intractablility  2  Risk    Psychological health  2    Chemical health  2    Reliability  2    Social support  2  Efficacy  2    Total DIRE Score = 14. Note that  7-13 predicts poor outcome (compliance and efficacy) from opioid prescribing; 14-21 predicts good outcome (compliance and efficacy)  from opioid prescribing.      Assessment:   1. Chronic left wrist pain  2. Post-traumatic osteoarthritis of the left wrist  3. PMHx includes: migraines, depression  4. PSHx includes: tubal ligation, tonsillectomy, endoscopy      Plan:  1. Physical Therapy: None at present  2. Clinical Health Psychologist: none at present  3. Diagnostic Studies:  None at present  4. Medication Management:    1. Continue Voltaren QID PRN  2. Continue Gabapentin 300/300/600mg TID. The pt will send me a Anita Margarita message if she wants to taper off of this medication.  5. Further procedures recommended: none at present  6. Nataliia to follow up with Primary Care provider regarding elevated blood pressure.  7. Recommendations to PCP: see above  8. Follow up: 8-12 weeks    Total time spent face to face was 15 minutes and more than 50% of face to face time was spent in counseling and/or coordination of care regarding the diagnosis and recommendations above.     The information in this document, created by the medical  scribe for me, accurately reflects the services I personally performed and the decisions made by me. I have reviewed and approved this document for accuracy prior to leaving the patient care area.  March 11, 2020     Florinda ROBLERO RN CNP, FNP  [facility]  [facility]

## 2020-04-06 DIAGNOSIS — J30.89 CHRONIC NON-SEASONAL ALLERGIC RHINITIS: ICD-10-CM

## 2020-04-06 RX ORDER — PSEUDOEPHEDRINE HCL 120 MG/1
TABLET, FILM COATED, EXTENDED RELEASE ORAL
Qty: 60 TABLET | Refills: 0 | Status: SHIPPED | OUTPATIENT
Start: 2020-04-06 | End: 2020-04-13

## 2020-04-07 DIAGNOSIS — M12.532 TRAUMATIC ARTHRITIS OF LEFT WRIST: ICD-10-CM

## 2020-04-07 RX ORDER — TRAMADOL HYDROCHLORIDE 50 MG/1
50 TABLET ORAL EVERY 6 HOURS PRN
Qty: 120 TABLET | Refills: 0 | Status: SHIPPED | OUTPATIENT
Start: 2020-04-07 | End: 2020-04-13

## 2020-04-07 NOTE — TELEPHONE ENCOUNTER
Requested Prescriptions   Pending Prescriptions Disp Refills     traMADol (ULTRAM) 50 MG tablet [Pharmacy Med Name: TRAMADOL HCL 50 MG TABLET] 120 tablet 0     Sig: TAKE 1 TABLET (50 MG) BY MOUTH EVERY 6 HOURS AS NEEDED FOR SEVERE PAIN       There is no refill protocol information for this order         Last Written Prescription Date:  3/10/20  Last Fill Quantity: 120,   # refills: 0  Last Office Visit: 12/10/19  Future Office visit:    Next 5 appointments (look out 90 days)    Apr 13, 2020  1:00 PM CDT  Telephone Visit with Meg Bernardo PA-C  [facility] [address] [place] [id number] [phone]           Routing refill request to provider for review/approval because:  Drug not on the [facility], [facility] or [facility] refill protocol or controlled substance

## 2020-04-08 ENCOUNTER — TELEPHONE (OUTPATIENT)
Dept: PALLIATIVE MEDICINE | Facility: CLINIC | Age: 60
End: 2020-04-08

## 2020-04-08 NOTE — TELEPHONE ENCOUNTER
Per Florinda Roberts, ANNIKA CNP's 3/11/2020 pt may decide to wean off of this medication so a 3 month supply would not be needed.    Called [facility] and left message on their line relaying the above. Advised them to call back if it is actually the pt requesting this; usually it is an automated thing where these requests are sent automatically.    Julia Hudson RN-BSN  [facility]

## 2020-04-08 NOTE — TELEPHONE ENCOUNTER
Received fax request from   [facility]/pharmacy #[id number] - [place], [place] - [address]  [facility] [address]  Phone: [phone] Fax: [phone]    pharmacy requesting 90day supply refill  for gabapentin (NEURONTIN) 300 MG capsule     Last refilled on 03/11/20    Pt last seen on 03/11/20    Next appt scheduled for 04/13/20    Will send the RN Pool to facilitate refill due to it being a 90 day supply request.    Lorena Carbajal [facility]  [place]

## 2020-04-13 ENCOUNTER — VIRTUAL VISIT (OUTPATIENT)
Dept: FAMILY MEDICINE | Facility: CLINIC | Age: 60
End: 2020-04-13
Payer: COMMERCIAL

## 2020-04-13 DIAGNOSIS — G47.00 PERSISTENT INSOMNIA: ICD-10-CM

## 2020-04-13 DIAGNOSIS — M25.532 CHRONIC PAIN OF LEFT WRIST: ICD-10-CM

## 2020-04-13 DIAGNOSIS — M19.132 POST-TRAUMATIC OSTEOARTHRITIS OF LEFT WRIST: ICD-10-CM

## 2020-04-13 DIAGNOSIS — F33.1 MAJOR DEPRESSIVE DISORDER, RECURRENT EPISODE, MODERATE (H): ICD-10-CM

## 2020-04-13 DIAGNOSIS — G89.29 CHRONIC PAIN OF LEFT WRIST: ICD-10-CM

## 2020-04-13 DIAGNOSIS — J30.89 CHRONIC NON-SEASONAL ALLERGIC RHINITIS: ICD-10-CM

## 2020-04-13 DIAGNOSIS — K25.9 GASTRIC ULCER WITHOUT HEMORRHAGE OR PERFORATION, UNSPECIFIED CHRONICITY: ICD-10-CM

## 2020-04-13 DIAGNOSIS — M12.532 TRAUMATIC ARTHRITIS OF LEFT WRIST: ICD-10-CM

## 2020-04-13 PROCEDURE — 99214 OFFICE O/P EST MOD 30 MIN: CPT | Mod: 95 | Performed by: PHYSICIAN ASSISTANT

## 2020-04-13 RX ORDER — PSEUDOEPHEDRINE HCL 120 MG/1
TABLET, FILM COATED, EXTENDED RELEASE ORAL
Qty: 60 TABLET | Refills: 5 | Status: SHIPPED | OUTPATIENT
Start: 2020-04-13 | End: 2020-11-09

## 2020-04-13 RX ORDER — TRAZODONE HYDROCHLORIDE 100 MG/1
TABLET ORAL
Qty: 90 TABLET | Refills: 1 | Status: SHIPPED | OUTPATIENT
Start: 2020-04-13 | End: 2020-11-24

## 2020-04-13 RX ORDER — ARIPIPRAZOLE 5 MG/1
5 TABLET ORAL DAILY
Qty: 90 TABLET | Refills: 1 | Status: SHIPPED | OUTPATIENT
Start: 2020-04-13 | End: 2020-11-24

## 2020-04-13 RX ORDER — TRAMADOL HYDROCHLORIDE 50 MG/1
50 TABLET ORAL EVERY 6 HOURS PRN
Qty: 120 TABLET | Refills: 0 | Status: SHIPPED | OUTPATIENT
Start: 2020-05-06 | End: 2020-06-05

## 2020-04-13 RX ORDER — FAMOTIDINE 40 MG/1
TABLET, FILM COATED ORAL
Qty: 90 TABLET | Refills: 3 | Status: SHIPPED | OUTPATIENT
Start: 2020-04-13 | End: 2022-03-01

## 2020-04-13 RX ORDER — FLUOXETINE 40 MG/1
40 CAPSULE ORAL DAILY
Qty: 90 CAPSULE | Refills: 1 | Status: SHIPPED | OUTPATIENT
Start: 2020-04-13 | End: 2020-11-24

## 2020-04-13 RX ORDER — GABAPENTIN 300 MG/1
CAPSULE ORAL
Qty: 120 CAPSULE | Refills: 0 | Status: SHIPPED | OUTPATIENT
Start: 2020-04-13 | End: 2020-05-08

## 2020-04-13 NOTE — PATIENT INSTRUCTIONS
Blood pressure should be less than 140/90.    Think about scheduling with the ENT (ear nose and throat) in the summer. [facility]: [facility] [phone]   [email or web address]    Call and schedule with the pain management team.

## 2020-04-13 NOTE — PROGRESS NOTES
"Nataliia Flanagan is a 59 year old female who is being evaluated via a billable telephone visit.      The patient has been notified of following:     \"This telephone visit will be conducted via a call between you and your physician/provider. We have found that certain health care needs can be provided without the need for a physical exam.  This service lets us provide the care you need with a short phone conversation.  If a prescription is necessary we can send it directly to your pharmacy.  If lab work is needed we can place an order for that and you can then stop by our lab to have the test done at a later time.    Telephone visits are billed at different rates depending on your insurance coverage. During this emergency period, for some insurers they may be billed the same as an in-person visit.  Please reach out to your insurance provider with any questions.    If during the course of the call the physician/provider feels a telephone visit is not appropriate, you will not be charged for this service.\"    Patient has given verbal consent for Telephone visit?  Yes    How would you like to obtain your AVS? MyChart    Subjective     Nataliia Flanagan is a 59 year old female who presents to clinic today for the following health issues:    Refill on medication.    Pt stated she was told she needed to have a appointment to get refills on her medication.      She has been taking the gabapentin as prescribed from the pain management team. She notes her wrist still hurts when she is carrying things. 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 She thinks her work aggravates it. The voltaren gel helps a lot. She is trying to use it the 4 times day.  She takes the Tramadol 4 times per day. It does dull her pain. She doesn't feel that the Tramadol is that strong. No follow up with pain management scheduled yet.     The pepcid works well. She notices it when she forgets it. No break through heartburn.      Prozac and " abilify is working well. Higher doses have made her drowsy. She takes it in the morning. No suicidal thoughts. Takes the trazodone for sleep.     She uses the sudafed daily. She notes her nose drips every days. She tried nasal steroid but had side effects. Has not seen ENT.     Last BP was high here but had normals more recently. Has a cuff at home but has not checked it.           Reviewed and updated as needed this visit by Provider         Review of Systems   ROS COMP: Constitutional, HEENT, cardiovascular, pulmonary, gi and gu systems are negative, except as otherwise noted.       Objective   Reported vitals:  There were no vitals taken for this visit.   healthy, alert   PSYCH: Alert and oriented times 3; coherent speech, normal   rate and volume, able to articulate logical thoughts, able   to abstract reason, no tangential thoughts, no hallucinations   or delusions  Her affect is normal  RESP: No cough, no audible wheezing, able to talk in full sentences  Remainder of exam unable to be completed due to telephone visits    Diagnostic Test Results:  Labs reviewed in Epic        Assessment/Plan:  1. Chronic non-seasonal allergic rhinitis    2. Gastric ulcer without hemorrhage or perforation, unspecified chronicity    3. Major depressive disorder, recurrent episode, moderate (H)    4. Persistent insomnia    5. Chronic pain of left wrist    6. Post-traumatic osteoarthritis of left wrist    7. Traumatic arthritis of left wrist      Conditions are stable. Refilled meds.   Patient will monitor BP at home, may need to transition off sudafed, patient reluctant. ENT referral placed for when COVID restrictions lifted.   Refilled Tramadol, pain team has not taken over prescription. Patient was encouraged to make her follow up appointment with them.   Chronic pain visits every 3 months unless pain team takes over prescribing.     No follow-ups on file.      Phone call duration:  13 minutes    Meg Bernardo PA-C

## 2020-06-05 DIAGNOSIS — M12.532 TRAUMATIC ARTHRITIS OF LEFT WRIST: ICD-10-CM

## 2020-06-05 RX ORDER — TRAMADOL HYDROCHLORIDE 50 MG/1
50 TABLET ORAL EVERY 6 HOURS PRN
Qty: 120 TABLET | Refills: 0 | Status: SHIPPED | OUTPATIENT
Start: 2020-06-05 | End: 2020-07-10

## 2020-06-05 NOTE — TELEPHONE ENCOUNTER
Refilled. Patient is due for her office visit with pain management. Needs to schedule. Should then follow up here after that appointment.   Meg Bernardo PA-C

## 2020-06-05 NOTE — LETTER
[address] [address]  Phone: [phone]  Fax: [phone]        June 12, 2020      Nataliiakeny Flanagan                                                                                                                                [facility] [facility] [address]            Dear Ms. Flanagan,    We are concerned about your health care.  We recently provided you with a medication refill.  due for her office visit with pain management. Needs to schedule. Should then follow up here after that appointment.     Your prescription: Has been refilled for 1 month so you may have time for the above noted follow-up.      Thank you,      Meg Bernardo PA-C  [facility]

## 2020-06-05 NOTE — TELEPHONE ENCOUNTER
Routing refill request to provider for review/approval because:  Drug not on the [facility] refill protocol   Sri HUMPHREYS,RN  [facility]

## 2020-06-25 DIAGNOSIS — M25.532 CHRONIC PAIN OF LEFT WRIST: ICD-10-CM

## 2020-06-25 DIAGNOSIS — M19.132 POST-TRAUMATIC OSTEOARTHRITIS OF LEFT WRIST: ICD-10-CM

## 2020-06-25 DIAGNOSIS — G89.29 CHRONIC PAIN OF LEFT WRIST: ICD-10-CM

## 2020-06-25 NOTE — TELEPHONE ENCOUNTER
Received fax request from   [facility]/pharmacy #[id number] - [place], [place] - [address]  [facility] [address]  Phone: [phone] Fax: [phone]    pharmacy requesting refill(s) for gabapentin (NEURONTIN) 300 MG capsule     Last refilled on 02/18/20    Pt last seen on 03/11/20    Next appt scheduled for 07/14/20    Will facilitate refill.    Lorena Carbajal [facility]  [place]

## 2020-06-26 RX ORDER — GABAPENTIN 300 MG/1
CAPSULE ORAL
Qty: 120 CAPSULE | Refills: 3 | Status: SHIPPED | OUTPATIENT
Start: 2020-06-26 | End: 2020-07-24

## 2020-06-26 NOTE — TELEPHONE ENCOUNTER
Pt is taking 300mg in am and afternoon and 600mg at bedtime.    Kishor Bush, RN  Care Coordinator   [facility] [place]

## 2020-06-26 NOTE — TELEPHONE ENCOUNTER
Patient was thinking about tapering off of this (gabapentin) medication.   Please call patient to find out if she is still using it and if so, how?  Then I can send in an appropriate refill for the gabapentin.    Florinda ROBLERO RN CNP, FNP  [facility]  [facility]

## 2020-06-26 NOTE — TELEPHONE ENCOUNTER
Signed Prescriptions:                        Disp   Refills    gabapentin (NEURONTIN) 300 MG capsule      120 ca*3        Sig: Take 300mg in the morning and afternoon and 600mg at           bedtime  Authorizing Provider: FENG ROSEN RN CNP, FNP  [facility]  [facility] [place]

## 2020-06-26 NOTE — TELEPHONE ENCOUNTER
Completed in another encounter.   Informed patient that their refill of gabapentin (NEURONTIN) 300 MG was E-scribed to the pharmacy.     Lorena Carbajal [facility]

## 2020-06-26 NOTE — TELEPHONE ENCOUNTER
Pt lvm 12:33pm    Reason for call:  Medication   If this is a refill request, has the caller requested the refill from the pharmacy already? No  Will the patient be using a [facility]? No  Name of the pharmacy and phone number for the current request: [id number] - [place], [place] - [address]    Name of the medication requested: gabapentin (NEURONTIN) 300 MG capsule    Other request:     Phone number to reach patient:  Home number on file [phone] (home)    Can we leave a detailed message on this number?  YES    Travel screening: Not Applicable         Tyrel VENEGAS    [facility] [place]

## 2020-07-14 ENCOUNTER — VIRTUAL VISIT (OUTPATIENT)
Dept: PALLIATIVE MEDICINE | Facility: CLINIC | Age: 60
End: 2020-07-14
Payer: COMMERCIAL

## 2020-07-14 DIAGNOSIS — M19.132 POST-TRAUMATIC OSTEOARTHRITIS OF LEFT WRIST: ICD-10-CM

## 2020-07-14 DIAGNOSIS — M25.532 CHRONIC PAIN OF LEFT WRIST: Primary | ICD-10-CM

## 2020-07-14 DIAGNOSIS — G89.29 CHRONIC PAIN OF LEFT WRIST: Primary | ICD-10-CM

## 2020-07-14 PROCEDURE — 99213 OFFICE O/P EST LOW 20 MIN: CPT | Mod: 95 | Performed by: NURSE PRACTITIONER

## 2020-07-14 ASSESSMENT — PAIN SCALES - GENERAL: PAINLEVEL: NO PAIN (0)

## 2020-07-14 NOTE — PATIENT INSTRUCTIONS
Plan:   1. Physical Therapy: None at present  2. Clinical Health Psychologist: none at present  3. Diagnostic Studies:  None at present  4. Medication Management:    1. Continue Voltaren QID PRN  2. REDUCE gabapentin by 300mg (1 capsule) per day every 7 days until off OR until pain worsens. May reduce more slowly if preferred.  5. Further procedures recommended: none at present  6. Recommendations to PCP: see above  7. Follow up: 6-8 weeks telephone visit (no smart phone or working computer)

## 2020-07-14 NOTE — PROGRESS NOTES
The patient has been notified of following:     This telephone visit will be conducted via a call between you and your provider. We have found that certain health care needs can be provided without the need for a physical exam.  This service lets us provide the care you need with a phone conversation.  If a prescription is necessary we can send it directly to your pharmacy.  If lab work is needed we can place an order for that and you can then stop by our lab to have the test done at a later time. This is a billable service but we do not know the cost at this time.     Patient has given verbal consent for Telephone visit?  Yes    Dana Gannon MA      [facility]    7/14/2020      Nataliia Flanagan is a 59 year old female who is being evaluated via a billable telephone visit.      Chief complaint: Left wrist pain    Interval history:  Nataliia Flanagan is a 59 year old female is known to me for Chronic left wrist pain  Post-traumatic osteoarthritis of the left wrist        Recommendations/plan at the last visit on 3/11/2020 included:  1. Physical Therapy: None at present  2. Clinical Health Psychologist: none at present  3. Diagnostic Studies:  None at present  4. Medication Management:    1. Continue Voltaren QID PRN  2. Continue Gabapentin 300/300/600mg TID. The pt will send me a [employer]System[employer] message if she wants to taper off of this medication.  5. Further procedures recommended: none at present  6. Nataliia to follow up with Primary Care provider regarding elevated blood pressure.  7. Recommendations to PCP: see above  8. Follow up: 8-12 weeks        Since her last visit, Nataliia Flanagan reports:  Interval history 7/14/2020  -still has some left wrist pain with lifting.   -she feels that the Voltaren gel has been very beneficial.   -She feels her pain is a little better than when I saw her in March 2020.       Interval history 3/11/2020  -Overall her left wrist pain has improved with the  use of Voltaren gel. Left wrist pain is only aggravated when she lifts heavy things. Feels that her pain is under good control with the Voltaren gel and gabapentin. Discussed leaving gabapentin where it is for another month, and if still feeling well, we can consider tapering down or off of gabapentin.   -Was seen in the ED on 2/16/2020 for gastritis.   -Gabapentin caused some fogginess. The patient will consider tapering off of gabapentin in a month or so.         At this point, the patient's participation with our multidisciplinary team includes:  The patient has been compliant with the program.  Pain Group - none at present  PT - none at present  Health Psych - none at present      Pain scores:  Pain intensity on average is 0-1 on a scale of 0-10.    Range is 0-4/10.   Pain right now is 0/10.  Pain is described as aching.    Pain is intermittent in nature    Current pain-related medication treatments include:  -Excedrin PRN (helpful for migraines, occasional use)  -Tramadol 50mg Q 6 hours PRN (helpful)  -trazodone 100mg at bedtime prn (helpful)  -Voltaren gel (very helpful)  -Gabapentin 300/300/600mg (somewhat helpful)     Other pertinent medications:   -abilify 5mg every day (helpful)  -Prozac 40mg every day (helpful)     Previous medication treatments included:   OPIATES:Tramadol (helpful), Tylenol #3 (helpful after the initial fracture)  NSAIDS: cannot take due to history of 3 gastric ulcers  MUSCLE RELAXANTS: none  ANTI-MIGRAINE MEDS: none  ANTI-DEPRESSANTS: Prozac (helpful)  SLEEP AIDS: trazodone (helpful)  ANTI-CONVULSANTS: gabapentin (helpful)  TOPICALS: Voltaren gel (very helpful for wrist pain)  Other meds: Tylenol (not helpful at all)        Other treatments have included:  Nataliia Flanagan has not been seen at a pain clinic in the past.    PT: did this right after the healing of the fracture  Chiropractic care: none  Acupuncture: none  TENs Unit: none     Injections:    -years ago she has had 2 left  wrist injections (helpful)  -left knee joint injection years ago (helpful)    Side Effects: no side effect  Patient is using the medication as prescribed: YES    Medications:  Current Outpatient Medications   Medication Sig Dispense Refill     ARIPiprazole (ABILIFY) 5 MG tablet Take 1 tablet (5 mg) by mouth daily 90 tablet 1     Aspirin-Acetaminophen-Caffeine (EXCEDRIN PO) Take 2 tablets by mouth as needed       diclofenac (VOLTAREN) 1 % topical gel Apply 2 g topically 4 times daily as needed for moderate pain 100 g 11     famotidine (PEPCID) 40 MG tablet TAKE 1 TABLET BY MOUTH EVERYDAY AT BEDTIME 90 tablet 3     FLUoxetine (PROZAC) 40 MG capsule Take 1 capsule (40 mg) by mouth daily 90 capsule 1     gabapentin (NEURONTIN) 300 MG capsule Take 300mg in the morning and afternoon and 600mg at bedtime 120 capsule 3     pseudoePHEDrine (EQL 12 HOUR DECONGESTANT) 120 MG 12 hr tablet TAKE ONE TABLET BY MOUTH EVERY TWELVE HOURS 60 tablet 5     traMADol (ULTRAM) 50 MG tablet TAKE 1 TABLET (50 MG) BY MOUTH EVERY 6 HOURS AS NEEDED FOR SEVERE PAIN 120 tablet 0     traZODone (DESYREL) 100 MG tablet TAKE 1 TABLET BY MOUTH EVERY DAY AT BEDTIME AS NEEDED FOR SLEEP 90 tablet 1       Medical History: any changes in medical history since they were last seen? No    Social History:   Home situation: .  No children  Occupation/Schooling: / for [employer] company   Tobacco use: none  Alcohol use: 2 glasses of mixed drink on the weekend  Drug use: none  History of chemical dependency treatment: none        Review of Systems:   The 14 system ROS was reviewed with the patient and is positive for:  Constitutional: fever/chills, fatigue, weight gain, weight loss  Eyes/Head: headache, dizziness  ENT: ringing in ears  Allergy/Immune: allergies  Skin: itching, rash, hives  Hematologic: easy bruising  Respiratory: cough, wheezing, shortness of breath  Cardiovascular: swelling in feet, fainting, palpitations, chest pain  GI:  abdominal pain, nausea, vomiting, diarrhea, constipation  Endocrine: steroid use  Musculoskeletal:  joint pain, arthritis, stiffness, gout, back pain, neck pain  Urinary: frequency, urgency, incontinence, hesitancy  Neurologic: weakness, numbness/tingling, seizure, stroke, memory loss  Mental health: depression, anxiety, stress, suicidal ideation        Physical Exam:  Vital signs: not currently breastfeeding.    Behavioral observations:  Awake, alert, and cooperative        [place] Pres[place]:  Reviewed [place] [facility] 7/14/2020- no concerning fills.  Florinda ROBLERO RN CNP, FRANCES  [facility]        DIRE Score for selecting candidates for long term opioid analgesia for chronic pain:  Diagnosis  2  Intractablility  2  Risk    Psychological health  2    Chemical health  2    Reliability  2    Social support  2  Efficacy  2    Total DIRE Score = 14. Note that  7-13 predicts poor outcome (compliance and efficacy) from opioid prescribing; 14-21 predicts good outcome (compliance and efficacy)  from opioid prescribing.      Assessment:   1. Chronic left wrist pain  2. Post-traumatic osteoarthritis of the left wrist  3. PMHx includes: migraines, depression  4. PSHx includes: tubal ligation, tonsillectomy, endoscopy      Plan:   1. Physical Therapy: None at present  2. Clinical Health Psychologist: none at present  3. Diagnostic Studies:  None at present  4. Medication Management:    1. Continue Voltaren QID PRN  2. REDUCE gabapentin by 300mg (1 capsule) per day every 7 days until off OR until pain worsens. May reduce more slowly if preferred.  5. Further procedures recommended: none at present  6. Recommendations to PCP: see above  7. Follow up: 6-8 weeks telephone visit (no smart phone or working computer)    Phone call duration: 14 minutes      Florinda ROBLERO RN CNP, FNP  [facility]

## 2020-07-22 ENCOUNTER — TELEPHONE (OUTPATIENT)
Dept: FAMILY MEDICINE | Facility: CLINIC | Age: 60
End: 2020-07-22

## 2020-07-22 DIAGNOSIS — K21.9 GASTROESOPHAGEAL REFLUX DISEASE WITHOUT ESOPHAGITIS: Primary | ICD-10-CM

## 2020-07-22 RX ORDER — PANTOPRAZOLE SODIUM 40 MG/1
40 TABLET, DELAYED RELEASE ORAL DAILY
Qty: 90 TABLET | Refills: 3 | Status: SHIPPED | OUTPATIENT
Start: 2020-07-22 | End: 2022-01-28

## 2020-07-22 NOTE — TELEPHONE ENCOUNTER
Famotidine on back order please send alternative.         Albania Hollis RN  Triage Nurse  [facility]  Appointment line: [phone]  [facility], 24 hour nurse line, available by calling clinic at [phone] and following prompts.

## 2020-07-22 NOTE — TELEPHONE ENCOUNTER
Reason for Call:  Other     Detailed comments: Famotidine 40 mg is backordered/unavailable. Please send over an alternative.     Phone Number [facility] pharmacy   Best Time:     Can we leave a detailed message on this number? Not Applicable    Call taken on 7/22/2020 at 3:24 PM by Amalia Gabriel

## 2020-07-23 ENCOUNTER — TELEPHONE (OUTPATIENT)
Dept: PALLIATIVE MEDICINE | Facility: CLINIC | Age: 60
End: 2020-07-23

## 2020-07-23 DIAGNOSIS — G89.29 CHRONIC PAIN OF LEFT WRIST: ICD-10-CM

## 2020-07-23 DIAGNOSIS — M19.132 POST-TRAUMATIC OSTEOARTHRITIS OF LEFT WRIST: ICD-10-CM

## 2020-07-23 DIAGNOSIS — M25.532 CHRONIC PAIN OF LEFT WRIST: ICD-10-CM

## 2020-07-23 NOTE — TELEPHONE ENCOUNTER
Received fax request from   [facility]/pharmacy #[id number] - [place], [place] - [address]  [facility] [address]  Phone: [phone] Fax: [phone]    pharmacy requesting 90 day supply for gabapentin (NEURONTIN) 300 MG capsule     Last refilled on 06/26/20    Pt last seen on 07/14/20    Next appt scheduled for 08/28/20    Will route to Provider to review.    Lorena Carbajal [facility]  [place]

## 2020-07-24 RX ORDER — GABAPENTIN 300 MG/1
CAPSULE ORAL
Qty: 360 CAPSULE | Refills: 1 | Status: SHIPPED | OUTPATIENT
Start: 2020-07-24 | End: 2020-11-24

## 2020-07-24 NOTE — TELEPHONE ENCOUNTER
Signed Prescriptions:                        Disp   Refills    gabapentin (NEURONTIN) 300 MG capsule      360 ca*1        Sig: Take 300mg in the morning and afternoon and 600mg at           bedtime  Authorizing Provider: FENG ROSEN RN CNP, FNP  [facility]  [facility]

## 2020-08-14 DIAGNOSIS — M12.532 TRAUMATIC ARTHRITIS OF LEFT WRIST: ICD-10-CM

## 2020-08-17 NOTE — TELEPHONE ENCOUNTER
Requested Prescriptions   Pending Prescriptions Disp Refills    traMADol (ULTRAM) 50 MG tablet [Pharmacy Med Name: TRAMADOL HCL 50 MG TABLET] 120 tablet 0     Sig: TAKE 1 TABLET (50 MG) BY MOUTH EVERY 6 HOURS AS NEEDED FOR SEVERE PAIN       There is no refill protocol information for this order        Routing refill request to provider for review/approval because:  Drug not on the [facility] [place]   Sri Moran RN,BSN  [facility]

## 2020-08-18 RX ORDER — TRAMADOL HYDROCHLORIDE 50 MG/1
50 TABLET ORAL EVERY 6 HOURS PRN
Qty: 120 TABLET | Refills: 0 | Status: SHIPPED | OUTPATIENT
Start: 2020-08-18 | End: 2020-09-18

## 2020-09-18 ENCOUNTER — VIRTUAL VISIT (OUTPATIENT)
Dept: PALLIATIVE MEDICINE | Facility: CLINIC | Age: 60
End: 2020-09-18
Payer: COMMERCIAL

## 2020-09-18 DIAGNOSIS — M25.532 CHRONIC PAIN OF LEFT WRIST: Primary | ICD-10-CM

## 2020-09-18 DIAGNOSIS — M19.132 POST-TRAUMATIC OSTEOARTHRITIS OF LEFT WRIST: ICD-10-CM

## 2020-09-18 DIAGNOSIS — G89.29 CHRONIC PAIN OF LEFT WRIST: Primary | ICD-10-CM

## 2020-09-18 PROCEDURE — 99213 OFFICE O/P EST LOW 20 MIN: CPT | Mod: 95 | Performed by: NURSE PRACTITIONER

## 2020-09-18 ASSESSMENT — PAIN SCALES - GENERAL: PAINLEVEL: MILD PAIN (2)

## 2020-09-18 NOTE — PATIENT INSTRUCTIONS
----------------------------------------------------------------  [facility] Number:  [phone]     Call with any questions about your care and for scheduling assistance.     Calls are returned Monday through Friday between 8 AM and 4:30 PM. We usually get back to you within 2 business days depending on the issue/request.    If we are prescribing your medications:    For opioid medication refills, call the clinic or send a [employer] message 7 days in advance.  Please include:    Name of requested medication    Name of the pharmacy.    For non-opioid medications, call your pharmacy directly to request a refill. Please allow 3-4 days to be processed.     Per [place] State Law:    All controlled substance prescriptions must be filled within 30 days of being written.      For those controlled substances allowing refills, pickup must occur within 30 days of last fill.      We believe regular attendance is key to your success in our program!      Any time you are unable to keep your appointment we ask that you call us at least 24 hours in advance to cancel.This will allow us to offer the appointment time to another patient.     Multiple missed appointments may lead to dismissal from the clinic.

## 2020-09-18 NOTE — PROGRESS NOTES
"Nataliia Flanagan is a 59 year old female who is being evaluated via a billable telephone visit.      The patient has been notified of following:     \"This telephone visit will be conducted via a call between you and your physician/provider. We have found that certain health care needs can be provided without the need for a physical exam.  This service lets us provide the care you need with a short phone conversation.  If a prescription is necessary we can send it directly to your pharmacy.  If lab work is needed we can place an order for that and you can then stop by our lab to have the test done at a later time.    Telephone visits are billed at different rates depending on your insurance coverage. During this emergency period, for some insurers they may be billed the same as an in-person visit.  Please reach out to your insurance provider with any questions.    If during the course of the call the physician/provider feels a telephone visit is not appropriate, you will not be charged for this service.\"    Patient has given verbal consent for Telephone visit?  Yes    What phone number would you like to be contacted at? [phone]    How would you like to obtain your AVS? Kevin Carbajal [facility]  [facility]    9/18/2020      Nataliia Flanagan is a 59 year old female who is being evaluated via a billable telephone visit.      Chief complaint: Left wrist pain    Interval history:  Nataliia Flanagan is a 59 year old female is known to me for Chronic left wrist pain  Post-traumatic osteoarthritis of the left wrist        Recommendations/plan at the last visit on 7/14/2020 included:  1. Physical Therapy: None at present  2. Clinical Health Psychologist: none at present  3. Diagnostic Studies:  None at present  4. Medication Management:    1. Continue Voltaren QID PRN  2. REDUCE gabapentin by 300mg (1 capsule) per day " every 7 days until off OR until pain worsens. May reduce more slowly if preferred.  5. Further procedures recommended: none at present  6. Recommendations to PCP: see above  7. Follow up: 6-8 weeks telephone visit (no smart phone or working computer)        Since her last visit, Nataliia Flanagan reports:  Interval history 9/18/2020  -left wrist pain is about the same, she feels that the Voltaren gel has been very beneficial.   -she tapered off of her gabapentin and she felt worse with her depression. She has re-started it at 300mg/day and this helped her pain a great deal.       Interval history 7/14/2020  -still has some left wrist pain with lifting.   -she feels that the Voltaren gel has been very beneficial.   -She feels her pain is a little better than when I saw her in March 2020.       Interval history 3/11/2020  -Overall her left wrist pain has improved with the use of Voltaren gel. Left wrist pain is only aggravated when she lifts heavy things. Feels that her pain is under good control with the Voltaren gel and gabapentin. Discussed leaving gabapentin where it is for another month, and if still feeling well, we can consider tapering down or off of gabapentin.   -Was seen in the ED on 2/16/2020 for gastritis.   -Gabapentin caused some fogginess. The patient will consider tapering off of gabapentin in a month or so.         At this point, the patient's participation with our multidisciplinary team includes:  The patient has been compliant with the program.  Pain Group - none at present  PT - none at present  Health Psych - none at present      Pain scores:  Pain intensity on average is 4-5 (when using the wrist) on a scale of 0-10.    Range is 0-5/10.   Pain right now is 0/10.  Pain is described as aching.    Pain is intermittent in nature    Current pain-related medication treatments include:  -Excedrin PRN (helpful for migraines, occasional use)  -Tramadol 50mg Q 6 hours PRN (helpful)  -trazodone 100mg at  bedtime prn (helpful)  -Voltaren gel (very helpful)  -Gabapentin 300/300/600mg (somewhat helpful)     Other pertinent medications:   -abilify 5mg every day (helpful)  -Prozac 40mg every day (helpful)     Previous medication treatments included:   OPIATES:Tramadol (helpful), Tylenol #3 (helpful after the initial fracture)  NSAIDS: cannot take due to history of 3 gastric ulcers  MUSCLE RELAXANTS: none  ANTI-MIGRAINE MEDS: none  ANTI-DEPRESSANTS: Prozac (helpful)  SLEEP AIDS: trazodone (helpful)  ANTI-CONVULSANTS: gabapentin (helpful)  TOPICALS: Voltaren gel (very helpful for wrist pain)  Other meds: Tylenol (not helpful at all)        Other treatments have included:  Nataliia VENEGAS Toniesal has not been seen at a pain clinic in the past.    PT: did this right after the healing of the fracture  Chiropractic care: none  Acupuncture: none  TENs Unit: none     Injections:    -years ago she has had 2 left wrist injections (helpful)  -left knee joint injection years ago (helpful)    Side Effects: no side effect  Patient is using the medication as prescribed: YES    Medications:  Current Outpatient Medications   Medication Sig Dispense Refill     ARIPiprazole (ABILIFY) 5 MG tablet Take 1 tablet (5 mg) by mouth daily 90 tablet 1     Aspirin-Acetaminophen-Caffeine (EXCEDRIN PO) Take 2 tablets by mouth as needed       diclofenac (VOLTAREN) 1 % topical gel Apply 2 g topically 4 times daily as needed for moderate pain 100 g 11     famotidine (PEPCID) 40 MG tablet TAKE 1 TABLET BY MOUTH EVERYDAY AT BEDTIME 90 tablet 3     FLUoxetine (PROZAC) 40 MG capsule Take 1 capsule (40 mg) by mouth daily 90 capsule 1     gabapentin (NEURONTIN) 300 MG capsule Take 300mg in the morning and afternoon and 600mg at bedtime 360 capsule 1     pantoprazole (PROTONIX) 40 MG EC tablet Take 1 tablet (40 mg) by mouth daily 90 tablet 3     pseudoePHEDrine (EQL 12 HOUR DECONGESTANT) 120 MG 12 hr tablet TAKE ONE TABLET BY MOUTH EVERY TWELVE HOURS 60 tablet 5      traMADol (ULTRAM) 50 MG tablet TAKE 1 TABLET (50 MG) BY MOUTH EVERY 6 HOURS AS NEEDED FOR SEVERE PAIN 120 tablet 0     traZODone (DESYREL) 100 MG tablet TAKE 1 TABLET BY MOUTH EVERY DAY AT BEDTIME AS NEEDED FOR SLEEP 90 tablet 1       Medical History: any changes in medical history since they were last seen? No    Social History:   Home situation: .  No children  Occupation/Schooling: / for [employer]   Tobacco use: none  Alcohol use: 2 glasses of mixed drink on the weekend  Drug use: none  History of chemical dependency treatment: none        Review of Systems:   The 14 system ROS was reviewed with the patient and is positive for:  Constitutional: fever/chills, fatigue, weight gain, weight loss  Eyes/Head: headache, dizziness  ENT: ringing in ears  Allergy/Immune: allergies  Skin: itching, rash, hives  Hematologic: easy bruising  Respiratory: cough, wheezing, shortness of breath  Cardiovascular: swelling in feet, fainting, palpitations, chest pain  GI: abdominal pain, nausea, vomiting, diarrhea, constipation  Endocrine: steroid use  Musculoskeletal:  joint pain, arthritis, stiffness, gout, back pain, neck pain  Urinary: frequency, urgency, incontinence, hesitancy  Neurologic: weakness, numbness/tingling, seizure, stroke, memory loss  Mental health: depression, anxiety, stress, suicidal ideation        Physical Exam:  Vital signs: not currently breastfeeding.    Behavioral observations:  Awake, alert, and cooperative  Pulm. Respirations easy and unlabored         [place] Pres[place]:  Reviewed [place] [facility] 9/18/2020- no concerning fills.  Florinda ROBLERO, RN CNP, FNP  [facility]  [facility]        DIRE Score for selecting candidates for long term opioid analgesia for chronic pain:  Diagnosis  2  Intractablility  2  Risk    Psychological health  2    Chemical health  2    Reliability  2    Social support  2  Efficacy  2    Total DIRE  Score = 14. Note that  7-13 predicts poor outcome (compliance and efficacy) from opioid prescribing; 14-21 predicts good outcome (compliance and efficacy)  from opioid prescribing.      Assessment:   1. Chronic left wrist pain  2. Post-traumatic osteoarthritis of the left wrist  3. PMHx includes: migraines, depression  4. PSHx includes: tubal ligation, tonsillectomy, endoscopy      Plan:   1. Physical Therapy: None at present  2. Clinical Health Psychologist: none at present  3. Diagnostic Studies:  None at present  4. Medication Management:    1. Continue Voltaren QID PRN  2. Continue gabapentin 300mg/day (this helps her mood, she felt more depressed when she tapered completely off of this medication)  5. Further procedures recommended: none at present  6. Recommendations to PCP: see above  7. Follow up: transferring care back to Primary Care Provider, patient is in agreement. I am happy to see her again if/when her pain is unmanageable.         Phone call duration: 15 minutes      Florinda ROBLERO RN CNP, FNP  [facility]  [facility]

## 2020-09-21 ENCOUNTER — OFFICE VISIT (OUTPATIENT)
Dept: FAMILY MEDICINE | Facility: CLINIC | Age: 60
End: 2020-09-21
Payer: COMMERCIAL

## 2020-09-21 VITALS
OXYGEN SATURATION: 99 % | SYSTOLIC BLOOD PRESSURE: 146 MMHG | WEIGHT: 98.5 LBS | TEMPERATURE: 98.5 F | DIASTOLIC BLOOD PRESSURE: 79 MMHG | BODY MASS INDEX: 17.45 KG/M2 | HEART RATE: 91 BPM

## 2020-09-21 DIAGNOSIS — M25.562 LEFT KNEE PAIN, UNSPECIFIED CHRONICITY: Primary | ICD-10-CM

## 2020-09-21 DIAGNOSIS — G89.4 CHRONIC PAIN SYNDROME: ICD-10-CM

## 2020-09-21 DIAGNOSIS — F33.1 MAJOR DEPRESSIVE DISORDER, RECURRENT EPISODE, MODERATE (H): ICD-10-CM

## 2020-09-21 PROCEDURE — 99213 OFFICE O/P EST LOW 20 MIN: CPT | Mod: 25 | Performed by: FAMILY MEDICINE

## 2020-09-21 PROCEDURE — 96127 BRIEF EMOTIONAL/BEHAV ASSMT: CPT | Performed by: FAMILY MEDICINE

## 2020-09-21 PROCEDURE — 20610 DRAIN/INJ JOINT/BURSA W/O US: CPT | Mod: LT | Performed by: FAMILY MEDICINE

## 2020-09-21 RX ORDER — METHYLPREDNISOLONE ACETATE 80 MG/ML
80 INJECTION, SUSPENSION INTRA-ARTICULAR; INTRALESIONAL; INTRAMUSCULAR; SOFT TISSUE ONCE
Status: COMPLETED | OUTPATIENT
Start: 2020-09-21 | End: 2020-09-21

## 2020-09-21 RX ADMIN — METHYLPREDNISOLONE ACETATE 80 MG: 80 INJECTION, SUSPENSION INTRA-ARTICULAR; INTRALESIONAL; INTRAMUSCULAR; SOFT TISSUE at 14:52

## 2020-09-21 ASSESSMENT — PAIN SCALES - GENERAL: PAINLEVEL: SEVERE PAIN (7)

## 2020-09-21 ASSESSMENT — PATIENT HEALTH QUESTIONNAIRE - PHQ9: SUM OF ALL RESPONSES TO PHQ QUESTIONS 1-9: 8

## 2020-09-21 NOTE — PROGRESS NOTES
Subjective     Nataliia Flanagan is a 59 year old female who presents to clinic today for the following health issues:    HPI       Cortisone injection left knee.    She has had pain in the left knee off and on for a number of years.  She apparently had a cortisone injection done 5 or 6 years ago that she found quite helpful.  I do see that x-rays were done of her left knee in 2015 and they were fairly unremarkable at that time.  She does have a history of chronic pain.  She has depression.  She is on medication for those conditions as per her chart.    Patient Active Problem List   Diagnosis     Chronic rhinitis     Malnutrition (H)     Iron deficiency anemia     Distal radius fracture     Joint inflammation of wrist - left     Gastroesophageal reflux disease without esophagitis     Major depressive disorder, recurrent episode, moderate (H)     Traumatic arthritis of left wrist     Chronic pain syndrome     Current Outpatient Medications   Medication     ARIPiprazole (ABILIFY) 5 MG tablet     Aspirin-Acetaminophen-Caffeine (EXCEDRIN PO)     diclofenac (VOLTAREN) 1 % topical gel     famotidine (PEPCID) 40 MG tablet     FLUoxetine (PROZAC) 40 MG capsule     gabapentin (NEURONTIN) 300 MG capsule     pantoprazole (PROTONIX) 40 MG EC tablet     pseudoePHEDrine (EQL 12 HOUR DECONGESTANT) 120 MG 12 hr tablet     traMADol (ULTRAM) 50 MG tablet     traZODone (DESYREL) 100 MG tablet     Current Facility-Administered Medications   Medication     betamethasone acet & sod phos (CELESTONE) injection 6 mg     ropivacaine (NAROPIN) injection 2 mL         Review of Systems   Constitutional, HEENT, cardiovascular, pulmonary, gi and gu systems are negative, except as otherwise noted.      Objective    BP (!) 146/79 (BP Location: Right arm, Patient Position: Sitting, Cuff Size: Adult Small)   Pulse 91   Temp 98.5  F (36.9  C) (Oral)   Wt 44.7 kg (98 lb 8 oz)   SpO2 99%   BMI 17.45 kg/m    Body mass index is 17.45 kg/m .  Physical  Exam   GENERAL: alert, no distress and thin  PSYCH: Affect is pleasant but somewhat flat.  She scored an 8 on her PHQ 9 today.  MS: no gross musculoskeletal defects noted, no edema.  No specific erythema or warmth of the left knee.  She points to the medial aspect of the left knee as to where she has most of the discomfort.    After discussion of various treatment options, and after reviewing risks and benefits and potential complications, we elected to proceed with a cortisone injection.  The lateral aspect of the Left knee was cleansed and then injected with my usual mixture of 1 cc of 80 mg/cc Depomedrol suspension along with 2 cc of 1% Lidocaine without epi and 2 cc of 0.5% Marcaine.    X-ray results of her left knee from 2015 were reviewed        Assessment & Plan       ICD-10-CM    1. Left knee pain, unspecified chronicity  M25.562 DRAIN/INJECT LARGE JOINT/BURSA     methylPREDNISolone (DEPO-MEDROL) injection 80 mg   2. Chronic pain syndrome  G89.4    3. Major depressive disorder, recurrent episode, moderate (H)  F33.1      We will see how the cortisone injection works for her left knee  Discussed that she could have these done as often as every 3 to 4 months if needed  Continue same medications for now and ongoing care with regular providers as per their recommendations    Return for a recheck if symptoms worsen or fail to improve.    Inocencio Marroquin MD  [facility]

## 2020-10-18 DIAGNOSIS — M12.532 TRAUMATIC ARTHRITIS OF LEFT WRIST: ICD-10-CM

## 2020-10-19 RX ORDER — TRAMADOL HYDROCHLORIDE 50 MG/1
50 TABLET ORAL EVERY 6 HOURS PRN
Qty: 120 TABLET | Refills: 0 | Status: SHIPPED | OUTPATIENT
Start: 2020-10-19 | End: 2020-11-24

## 2020-10-19 NOTE — TELEPHONE ENCOUNTER
Requested Prescriptions   Pending Prescriptions Disp Refills    traMADol (ULTRAM) 50 MG tablet [Pharmacy Med Name: TRAMADOL HCL 50 MG TABLET] 120 tablet 0     Sig: TAKE 1 TABLET (50 MG) BY MOUTH EVERY 6 HOURS AS NEEDED FOR SEVERE PAIN       There is no refill protocol information for this order        Routing refill request to provider for review/approval because:  Drug not on the [facility] refill protocol   Sri Moran RN,BSN  [facility]

## 2020-11-06 DIAGNOSIS — J30.89 CHRONIC NON-SEASONAL ALLERGIC RHINITIS: ICD-10-CM

## 2020-11-09 RX ORDER — PSEUDOEPHEDRINE HCL 120 MG/1
TABLET, FILM COATED, EXTENDED RELEASE ORAL
Qty: 60 TABLET | Refills: 0 | Status: SHIPPED | OUTPATIENT
Start: 2020-11-09 | End: 2020-11-24

## 2020-11-09 NOTE — TELEPHONE ENCOUNTER
Requested Prescriptions   Pending Prescriptions Disp Refills    pseudoePHEDrine (EQL 12 HOUR DECONGESTANT) 120 MG 12 hr tablet 60 tablet 5     Sig: TAKE ONE TABLET BY MOUTH EVERY TWELVE HOURS       There is no refill protocol information for this order        Routing refill request to provider for review/approval because:  Drug not on the [facility] refill protocol   LEXX MoranN-RN  [facility]

## 2020-11-09 NOTE — TELEPHONE ENCOUNTER
1 fill. Patient needs in office visit to discuss continued use. BP was high at last office visit and this may no longer be an appropriate treatment.   Meg Bernardo PA-C

## 2020-11-10 NOTE — TELEPHONE ENCOUNTER
BYOM! message sent.    Thank you!    Calli BLEVINS  [facility] Referral Rep

## 2020-11-13 NOTE — TELEPHONE ENCOUNTER
Left message to contacted clinic and when contacted please schedule as directed below. PINKY Chaidez

## 2020-11-22 DIAGNOSIS — M12.532 TRAUMATIC ARTHRITIS OF LEFT WRIST: ICD-10-CM

## 2020-11-22 DIAGNOSIS — G47.00 PERSISTENT INSOMNIA: ICD-10-CM

## 2020-11-24 ENCOUNTER — VIRTUAL VISIT (OUTPATIENT)
Dept: FAMILY MEDICINE | Facility: CLINIC | Age: 60
End: 2020-11-24
Payer: COMMERCIAL

## 2020-11-24 DIAGNOSIS — M25.532 CHRONIC PAIN OF LEFT WRIST: ICD-10-CM

## 2020-11-24 DIAGNOSIS — F33.1 MAJOR DEPRESSIVE DISORDER, RECURRENT EPISODE, MODERATE (H): ICD-10-CM

## 2020-11-24 DIAGNOSIS — M19.132 POST-TRAUMATIC OSTEOARTHRITIS OF LEFT WRIST: ICD-10-CM

## 2020-11-24 DIAGNOSIS — J30.89 CHRONIC NON-SEASONAL ALLERGIC RHINITIS: ICD-10-CM

## 2020-11-24 DIAGNOSIS — G47.00 PERSISTENT INSOMNIA: ICD-10-CM

## 2020-11-24 DIAGNOSIS — G89.29 CHRONIC PAIN OF LEFT WRIST: ICD-10-CM

## 2020-11-24 DIAGNOSIS — Z12.11 COLON CANCER SCREENING: Primary | ICD-10-CM

## 2020-11-24 DIAGNOSIS — M12.532 TRAUMATIC ARTHRITIS OF LEFT WRIST: ICD-10-CM

## 2020-11-24 PROCEDURE — 99214 OFFICE O/P EST MOD 30 MIN: CPT | Mod: 95 | Performed by: PHYSICIAN ASSISTANT

## 2020-11-24 RX ORDER — FLUOXETINE 40 MG/1
40 CAPSULE ORAL DAILY
Qty: 90 CAPSULE | Refills: 1 | Status: SHIPPED | OUTPATIENT
Start: 2020-11-24 | End: 2021-05-04

## 2020-11-24 RX ORDER — TRAZODONE HYDROCHLORIDE 100 MG/1
TABLET ORAL
Qty: 90 TABLET | Refills: 1 | Status: SHIPPED | OUTPATIENT
Start: 2020-11-24 | End: 2021-05-04

## 2020-11-24 RX ORDER — GABAPENTIN 300 MG/1
300 CAPSULE ORAL AT BEDTIME
Qty: 90 CAPSULE | Refills: 1 | Status: SHIPPED | OUTPATIENT
Start: 2020-11-24 | End: 2021-05-04

## 2020-11-24 RX ORDER — ARIPIPRAZOLE 5 MG/1
5 TABLET ORAL DAILY
Qty: 90 TABLET | Refills: 1 | Status: SHIPPED | OUTPATIENT
Start: 2020-11-24 | End: 2021-07-08

## 2020-11-24 RX ORDER — TRAMADOL HYDROCHLORIDE 50 MG/1
50 TABLET ORAL EVERY 6 HOURS PRN
Qty: 120 TABLET | Refills: 0 | Status: SHIPPED | OUTPATIENT
Start: 2020-11-24 | End: 2020-12-28

## 2020-11-24 RX ORDER — TRAZODONE HYDROCHLORIDE 100 MG/1
TABLET ORAL
Qty: 90 TABLET | Refills: 1 | OUTPATIENT
Start: 2020-11-24

## 2020-11-24 RX ORDER — TRAMADOL HYDROCHLORIDE 50 MG/1
50 TABLET ORAL EVERY 6 HOURS PRN
Qty: 120 TABLET | Refills: 0 | OUTPATIENT
Start: 2020-11-24

## 2020-11-24 RX ORDER — PSEUDOEPHEDRINE HCL 120 MG/1
TABLET, FILM COATED, EXTENDED RELEASE ORAL
Qty: 60 TABLET | Refills: 5 | Status: SHIPPED | OUTPATIENT
Start: 2020-11-24 | End: 2021-06-23

## 2020-11-24 ASSESSMENT — PAIN SCALES - GENERAL: PAINLEVEL: MILD PAIN (3)

## 2020-11-24 NOTE — TELEPHONE ENCOUNTER
Prescription refused. Duplicate request.    Provider sent refills for trazodone and tramadol 11/24/2020.    Marianne Garcia RN

## 2020-11-24 NOTE — PROGRESS NOTES
"Nataliia Flanagan is a 60 year old female who is being evaluated via a billable telephone visit.      The patient has been notified of following:     \"This telephone visit will be conducted via a call between you and your physician/provider. We have found that certain health care needs can be provided without the need for a physical exam.  This service lets us provide the care you need with a short phone conversation.  If a prescription is necessary we can send it directly to your pharmacy.  If lab work is needed we can place an order for that and you can then stop by our lab to have the test done at a later time.    Telephone visits are billed at different rates depending on your insurance coverage. During this emergency period, for some insurers they may be billed the same as an in-person visit.  Please reach out to your insurance provider with any questions.    If during the course of the call the physician/provider feels a telephone visit is not appropriate, you will not be charged for this service.\"    Patient has given verbal consent for Telephone visit?  Yes    What phone number would you like to be contacted at? [phone]    How would you like to obtain your AVS? Rupalt    Subjective     Nataliia Flanagan is a 60 year old female who presents via phone visit today for the following health issues:    HPI     Chronic Pain Follow-Up    Where in your body do you have pain? Left wrist  How has your pain affected your ability to work? Pain does not limit ability to work  Which of these pain treatments have you tried since your last clinic visit? Cold, Heat, Massage and Rest  How well are you sleeping? Poor  How has your mood been since your last visit? Better  Have you had a significant life event? No  Other aggravating factors: carrying, lifting  Taking medication as directed? Yes    PHQ-9 SCORE 5/16/2019 12/10/2019 9/21/2020   PHQ-9 Total Score - - -   PHQ-9 Total Score 5 8 8     CAROLYN-7 SCORE 8/3/2016   Total " Score 17     No flowsheet data found.  Encounter-Level CSA:    There are no encounter-level csa.     Patient-Level CSA:    Controlled Substance Agreement - Opioid - Scan on 7/15/2019  1:42 PM         How many servings of fruits and vegetables do you eat daily?  0-1    On average, how many sweetened beverages do you drink each day (Examples: soda, juice, sweet tea, etc.  Do NOT count diet or artificially sweetened beverages)?   3-4    How many days per week do you exercise enough to make your heart beat faster? 3 or less    How many minutes a day do you exercise enough to make your heart beat faster? 9 or less    How many days per week do you miss taking your medication? 0     Patient is taking tramadol daily, 1 QID. No side effects from the tramadol.   Using the voltaren gel multiple times per day.   Gabapentin 300mg at night. She has noticed it has helped her mood. Prefers to continue this.   Mood has been stable with the prozac and abilify. Uses the trazodone nightly.   Had her cortisone injections in September which helped some with knee pain but plans to get new injections soon.     GERD- stable with protonix and famotidine.     Sudafed, takes daily. Nose runs without it.   Blood pressure 114/70 at home.       Review of Systems   Constitutional, HEENT, cardiovascular, pulmonary, gi and gu systems are negative, except as otherwise noted.       Objective          Vitals:  No vitals were obtained today due to virtual visit.    healthy, alert and no distress  PSYCH: Alert and oriented times 3; coherent speech, normal   rate and volume, able to articulate logical thoughts, able   to abstract reason, no tangential thoughts, no hallucinations   or delusions  Her affect is normal  RESP: No cough, no audible wheezing, able to talk in full sentences  Remainder of exam unable to be completed due to telephone visits            Assessment/Plan:    Assessment & Plan     Chronic pain of left wrist  Evaluated by pain clinic.  Continue gabapentin, tramadol and voltaren. Pain stable with these medications.   - gabapentin (NEURONTIN) 300 MG capsule; Take 1 capsule (300 mg) by mouth At Bedtime    Post-traumatic osteoarthritis of left wrist  As above.   - gabapentin (NEURONTIN) 300 MG capsule; Take 1 capsule (300 mg) by mouth At Bedtime    Persistent insomnia  Stable with current meds  - traZODone (DESYREL) 100 MG tablet; TAKE 1 TABLET BY MOUTH EVERY DAY AT BEDTIME AS NEEDED FOR SLEEP    Major depressive disorder, recurrent episode, moderate (H)  Stable with current meds  - FLUoxetine (PROZAC) 40 MG capsule; Take 1 capsule (40 mg) by mouth daily  - ARIPiprazole (ABILIFY) 5 MG tablet; Take 1 tablet (5 mg) by mouth daily    Chronic non-seasonal allergic rhinitis  Home Blood pressure is normal. Will need to continue to monitor.   - pseudoePHEDrine (EQL 12 HOUR DECONGESTANT) 120 MG 12 hr tablet; TAKE ONE TABLET BY MOUTH EVERY TWELVE HOURS    Traumatic arthritis of left wrist  As above.   - traMADol (ULTRAM) 50 MG tablet; Take 1 tablet (50 mg) by mouth every 6 hours as needed for severe pain    Colon cancer screening  Patient to  next time in clinic as we can not mail in winter.   - Fecal colorectal cancer screen (FIT); Future            Return in about 3 months (around 2/24/2021) for Pain Check.    Meg Bernardo PA-C  [facility]    Phone call duration:  12 minutes

## 2020-12-02 NOTE — TELEPHONE ENCOUNTER
Mailed PHQ9 to patient.    Mamie Goyal, CMA      Mart-1 - Negative Histology Text: MART-1 staining demonstrates a normal density and pattern of melanocytes along the dermal-epidermal junction. The surgical margins are negative for tumor cells.

## 2021-01-11 ENCOUNTER — OFFICE VISIT (OUTPATIENT)
Dept: FAMILY MEDICINE | Facility: CLINIC | Age: 61
End: 2021-01-11
Payer: COMMERCIAL

## 2021-01-11 VITALS
TEMPERATURE: 98.7 F | HEIGHT: 64 IN | BODY MASS INDEX: 18.71 KG/M2 | SYSTOLIC BLOOD PRESSURE: 140 MMHG | OXYGEN SATURATION: 99 % | HEART RATE: 86 BPM | WEIGHT: 109.6 LBS | DIASTOLIC BLOOD PRESSURE: 90 MMHG

## 2021-01-11 DIAGNOSIS — M12.532 TRAUMATIC ARTHRITIS OF LEFT WRIST: ICD-10-CM

## 2021-01-11 DIAGNOSIS — Z12.11 SCREENING FOR COLON CANCER: Primary | ICD-10-CM

## 2021-01-11 DIAGNOSIS — G89.4 CHRONIC PAIN SYNDROME: ICD-10-CM

## 2021-01-11 PROCEDURE — 99000 SPECIMEN HANDLING OFFICE-LAB: CPT | Performed by: PHYSICIAN ASSISTANT

## 2021-01-11 PROCEDURE — 80307 DRUG TEST PRSMV CHEM ANLYZR: CPT | Mod: 90 | Performed by: PHYSICIAN ASSISTANT

## 2021-01-11 PROCEDURE — 99213 OFFICE O/P EST LOW 20 MIN: CPT | Performed by: PHYSICIAN ASSISTANT

## 2021-01-11 RX ORDER — TRAMADOL HYDROCHLORIDE 50 MG/1
50 TABLET ORAL EVERY 6 HOURS PRN
Qty: 120 TABLET | Refills: 0 | Status: SHIPPED | OUTPATIENT
Start: 2021-01-26 | End: 2021-03-29

## 2021-01-11 ASSESSMENT — PAIN SCALES - GENERAL: PAINLEVEL: NO PAIN (0)

## 2021-01-11 ASSESSMENT — MIFFLIN-ST. JEOR: SCORE: 1058.63

## 2021-01-11 NOTE — LETTER
[facility]  01/11/21    Patient: Nataliia Flanagan  YOB: 1960  Medical Record Number: [id number]                                                                  Opioid / Opioid Plus Controlled Substance Agreement    I understand that my care provider has prescribed an opioid (narcotic) controlled substance to help manage my condition(s). I am taking this medicine to help me function or work. I know this is strong medicine, and that it can cause serious side effects. Opioid medicine can be sedating, addicting and may cause a dependency on the drug. They can affect my ability to drive or think, and cause depression. They need to be taken exactly as prescribed. Combining opioids with certain medicines or chemicals (such as cocaine, sedatives and tranquilizers, sleeping pills, meth) can be dangerous or even fatal. Also, if I stop opioids suddenly, I may have severe withdrawal symptoms. Last, I understand that opioids do not work for all types of pain nor for all patients. If not helpful, I may be asked to stop them.        The risks, benefits, and side effects of these medicine(s) were explained to me. I agree that:    1. I will take part in other treatments as advised by my care team. This may be psychiatry or counseling, physical therapy, behavioral therapy, group treatment or a referral to a pain clinic. I will reduce or stop my medicine when my care team tells me to do so.  2. I will take my medicines as prescribed. I will not change the dose or schedule unless my care team tells me to. There will be no refills if I  run out early.   I may be contactedwithout warning and asked to complete a urine drug test or pill count at any time.   3. I will keep all my appointments, and understand this is part of the monitoring of opioids. My care team may require an office visit for EVERY opioid/controlled substance refill. If I miss appointments or don t follow instructions, my care team  may stop my medicine.  4. I will not ask other providers to prescribe controlled substances, and I will not accept controlled substances from other people. If I need another prescribed controlled substance for a new reason, I will tell my care team within 1 business day.  5. I will use one pharmacy to fill all of my controlled substance prescriptions, and it is up to me to make sure that I do not run out of my medicines on weekends or holidays. If my care team is willing to refill my opioid prescription without a visit, I must request refills only during office hours, refills may take up to 3 days to process, and it may take up to 5 to 7 days for my medicine to be mailed and ready at my pharmacy. Prescriptions will not be mailed anywhere except my pharmacy.        [id number]  Rev 12/18         Registration to scan to EHR                             Page 1 of 2               Controlled Substance Agreement [facility]  01/11/21  Patient: Nataliia Flanagan  YOB: 1960  Medical Record Number: [id number]                                                                  6. I am responsible for my prescriptions. If the medicine/prescription is lost or stolen, it will not be replaced. I also agree not to share controlled substance medicines with anyone.  7. I agree to not use ANY illegal or recreational drugs. This includes marijuana, cocaine, bath salts or other drugs. I agree not to use alcohol unless my care team says I may.          I agree to give urine samples whenever asked. If I don t give a urine sample, the care team may stop my medicine.    8. If I enroll in the [place] Medical Marijuana program, I will tell my care team. I will also sign an agreement to share my medical records with my care team.   9. I will bring in my list of medicines (or my medicine bottles) each time I come to the clinic.   10. I will tell my care team right away if I become pregnant or have a new  medical problem treated outside of my regular clinic.  11. I understand that this medicine can affect my thinking and judgment. It may be unsafe for me to drive, use machinery and do dangerous tasks. I will not do any of these things until I know how the medicine affects me. If my dose changes, I will wait to see how it affects me. I will contact my care team if I have concerns about medicine side effects.    I understand that if I do not follow any of the conditions above, my prescriptions or treatment may be stopped.      I agree that my provider, clinic care team, and pharmacy may work with any city, state or federal law enforcement agency that investigates the misuse, sale, or other diversion of my controlled medicine. I will allow my provider to discuss my care with or share a copy of this agreement with any other treating provider, pharmacy or emergency room where I receive care. I agree to give up (waive) any right of privacy or confidentiality with respect to these consents.     I have read this agreement and have asked questions about anything I did not understand.      ________________________________________________________________________  Patient signature - Date/Time -  Nataliia Flanagan                                      ________________________________________________________________________  Witness signature                                                            ________________________________________________________________________  Provider signature - Meg Bernardo PA-C      [id number]  Rev 12/18         Registration to scan to EHR                         Page 2 of 2                   Controlled Substance Agreement Opioid           Page 1 of 2  Opioid Pain Medicines (also known as Narcotics)  What You Need to Know    What are opioids?   Opioids are pain medicines that must be prescribed by a doctor.  They are also known as narcotics.    Examples are:     morphine (MS Contin,  Ainsley)    oxycodone (Oxycontin)    oxycodone and acetaminophen (Percocet)    hydrocodone and acetaminophen (Vicodin, Norco)     fentanyl patch (Duragesic)     hydromorphone (Dilaudid)     methadone     What do opioids do well?   Opioids are best for short-term pain after a surgery or injury. They also work well for cancer pain. Unlike other pain medicines, they do not cause liver or kidney failure or ulcers. They may help some people with long-lasting (chronic) pain.     What do opioids NOT do well?   Opioids never get rid of pain entirely, and they do not work well for most patients with chronic pain. Opioids do not reduce swelling, one of the causes of pain. They also don t work well for nerve pain.                           For informational purposes only.  Not to replace the advice of your care provider.  Copyright [facility]. All right reserved. [employer] [id number] 02/18.      Page 2 of 2    Risks and side effects   Talk to your doctor before you start or decide to keep taking one of these medicines. Side effects include:    Lowering your breathing rate enough to cause death    Overdose, including death, especially if taking higher than prescribed doses    Long-term opioid use    Worse depression symptoms; less pleasure in things you usually enjoy    Feeling tired or sluggish    Slower thoughts or cloudy thinking    Being more sensitive to pain over time; pain is harder to control    Trouble sleeping or restless sleep    Changes in hormone levels (for example, less testosterone)    Changes in sex drive or ability to have sex    Constipation    Unsafe driving    Itching and sweating    Feeling dizzy    Nausea, vomiting and dry mouth    What else should I know about opioids?  When someone takes opioids for too long or too often, they become dependent. This means that if you stop or reduce the medicine too quickly, you will have withdrawal symptoms.    Dependence is not the same as addiction.  Addiction is when people keep using a substance that harms their body, their mind or their relations with others. If you have a history of drug or alcohol abuse, taking opioids can cause a relapse.    Over time, opioids don t work as well. Most people will need higher and higher doses. The higher the dose, the more serious the side effects. We don t know the long-term effects of opioids.      Prescribed opioids aren't the best way to manage chronic pain    Other ways to manage pain include:      Ibuprofen or acetaminophen.  You should always try this first.      Treat health problems that may be causing pain.      acupuncture or massage, deep breathing, meditation, visual imagery, aromatherapy.      Use heat or ice at the pain site      Physical therapy and exercise      Stop smoking      See a counselor or therapist                                                  People who have used opioids for a long time may have a lower quality of life, worse depression, higher levels of pain and more visits to doctors.    Never share your opioids with others. Be sure to store opioids in a secure place, locked if possible.Young children can easily swallow them and overdose.     You can overdose on opioids.  Signs of overdose include decrease or loss of consciousness, slowed breathing, trouble waking and blue lips.  If someone is worried about overdose, they should call 911.    If you are at risk for overdose, you may get naloxone (Narcan, a medicine that reverses the effects of opioids.  If you overdose, a friend or family member can give you Narcan while waiting for the ambulance.  They need to know the signs of overdose and how to give Narcan.    While you're taking opioids:    Don't use alcohol or street drugs. Taking them together can cause death.    Don't take any of these medicines unless your doctor says its okay.  Taking these with opioids can cause death.    Benzodiazepines (such as lorazepam         or  diazepam)    Muscle relaxers (such as cyclobenzaprine)    sleeping pills    other opioids    Safe disposal of opioids  Find your area drug take-back program, your pharmacy mail-back program, buy a special disposal bag (such as Deterra) from your pharmacy or flush them down the toilet.  Use the guidelines at:  www.fda.gov/drugs/resourcesforyou

## 2021-01-11 NOTE — PROGRESS NOTES
Assessment & Plan     Traumatic arthritis of left wrist  Refilled pain medication. Plan approved by pain team. Urine drug screen and updated pain contract today.   - traMADol (ULTRAM) 50 MG tablet; Take 1 tablet (50 mg) by mouth every 6 hours as needed for severe pain  - Drug  Screen Comprehensive , Urine with Reported Meds (Med[employer]ox) (Pain Care Package)    Screening for colon cancer  - CHRISTINA([employer])    Chronic pain syndrome                               Return in about 3 months (around 4/11/2021) for Pain Check.    Meg Bernardo PA-C  [facility] ROBBIE William is a 60 year old who presents to clinic today for the following health issues     HPI       Chronic Pain Follow-Up    Where in your body do you have pain? Left wrist  How has your pain affected your ability to work? Pain does not limit ability to work  Which of these pain treatments have you tried since your last clinic visit? none  How well are you sleeping? Fair  How has your mood been since your last visit? About the same  Have you had a significant life event? No  Other aggravating factors: use of left wrist  Taking medication as directed? Yes    PHQ-9 SCORE 5/16/2019 12/10/2019 9/21/2020   PHQ-9 Total Score - - -   PHQ-9 Total Score 5 8 8     CAROLYN-7 SCORE 8/3/2016   Total Score 17     No flowsheet data found.  Encounter-Level CSA:    There are no encounter-level csa.     Patient-Level CSA:    Controlled Substance Agreement - Opioid - Scan on 7/15/2019  1:42 PM         How many servings of fruits and vegetables do you eat daily?  0-1    On average, how many sweetened beverages do you drink each day (Examples: soda, juice, sweet tea, etc.  Do NOT count diet or artificially sweetened beverages)?   3    How many days per week do you exercise enough to make your heart beat faster? 3 or less    How many minutes a day do you exercise enough to make your heart beat faster? 9 or less    How many days per week do you  "miss taking your medication? 0    Using the voltaren gel 1-2 times per day. Very helpful.   The 300mg of gabapentin at night has helped the pain and kept her mood stable.   Tramadol takes 1 tablet 4 times per day. Keeps pain under control for her.   Has been evaluated and plan approved by pain team.     Sleep has been ok- some nights where she can't sleep but mostly pretty good. Takes the trazodone nightly.     Still using the sudafed daily. She notes 114/80 at home.   GERD- protonix with famotidine is keeping symptoms down.   Mood has been stable, stress with COVID.     Review of Systems   Constitutional, HEENT, cardiovascular, pulmonary, gi and gu systems are negative, except as otherwise noted.      Objective    BP (!) 140/90   Pulse 86   Temp 98.7  F (37.1  C) (Oral)   Ht 1.636 m (5' 4.41\")   Wt 49.7 kg (109 lb 9.6 oz)   SpO2 99%   Breastfeeding No   BMI 18.57 kg/m    Body mass index is 18.57 kg/m .  Physical Exam   GENERAL: healthy, alert and no distress  RESP: lungs clear to auscultation - no rales, rhonchi or wheezes  CV: regular rate and rhythm, normal S1 S2, no S3 or S4, no murmur,   MS: no gross musculoskeletal defects noted, no edema- full range of motion of the left wrist. Minimal pain with compression pressure to the left wrist.   SKIN: no suspicious lesions or rashes  NEURO: Normal strength and tone, mentation intact and speech normal  PSYCH: mentation appears normal, affect normal/bright                "

## 2021-01-14 ENCOUNTER — OFFICE VISIT (OUTPATIENT)
Dept: FAMILY MEDICINE | Facility: CLINIC | Age: 61
End: 2021-01-14
Payer: COMMERCIAL

## 2021-01-14 VITALS
HEART RATE: 89 BPM | SYSTOLIC BLOOD PRESSURE: 142 MMHG | BODY MASS INDEX: 17.96 KG/M2 | WEIGHT: 106 LBS | DIASTOLIC BLOOD PRESSURE: 80 MMHG | TEMPERATURE: 98.2 F | OXYGEN SATURATION: 100 %

## 2021-01-14 DIAGNOSIS — M25.562 CHRONIC PAIN OF LEFT KNEE: Primary | ICD-10-CM

## 2021-01-14 DIAGNOSIS — G89.4 CHRONIC PAIN SYNDROME: ICD-10-CM

## 2021-01-14 DIAGNOSIS — G89.29 CHRONIC PAIN OF LEFT KNEE: Primary | ICD-10-CM

## 2021-01-14 PROCEDURE — 20610 DRAIN/INJ JOINT/BURSA W/O US: CPT | Mod: LT | Performed by: FAMILY MEDICINE

## 2021-01-14 RX ORDER — METHYLPREDNISOLONE ACETATE 80 MG/ML
80 INJECTION, SUSPENSION INTRA-ARTICULAR; INTRALESIONAL; INTRAMUSCULAR; SOFT TISSUE ONCE
Status: COMPLETED | OUTPATIENT
Start: 2021-01-14 | End: 2021-01-14

## 2021-01-14 RX ADMIN — METHYLPREDNISOLONE ACETATE 80 MG: 80 INJECTION, SUSPENSION INTRA-ARTICULAR; INTRALESIONAL; INTRAMUSCULAR; SOFT TISSUE at 12:55

## 2021-01-14 ASSESSMENT — PAIN SCALES - GENERAL: PAINLEVEL: SEVERE PAIN (6)

## 2021-01-14 NOTE — PROGRESS NOTES
Assessment & Plan       ICD-10-CM    1. Chronic pain of left knee  M25.562 DRAIN/INJECT LARGE JOINT/BURSA    G89.29 methylPREDNISolone (DEPO-MEDROL) injection 80 mg   2. Chronic pain syndrome  G89.4      We will see how the cortisone shot works for her again Consider repeat injection in 3 to 4 months if/as needed      Return for a recheck if symptoms worsen or fail to improve.    Inocencio Marroquin MD  [facility] ROBBIE William is a 60 year old who presents to clinic today for the following health issues:     HPI       Cortisone injection left knee. Pain is 6/10 right now and 10/10 when really bad.    She was seen a few days ago by her PCP for some generalized chronic pain and specifically for left wrist pain.  See previous note on that.  She has also had left knee pain off and on for a number of years.  I had seen her back in September for her left knee pain and gave her a cortisone injection.  She did get some relief from that, but it has worn off and she would like to get another cortisone injection.    She remains on baseline medications as below.    Patient Active Problem List   Diagnosis     Chronic rhinitis     Malnutrition (H)     Iron deficiency anemia     Distal radius fracture     Joint inflammation of wrist - left     Gastroesophageal reflux disease without esophagitis     Major depressive disorder, recurrent episode, moderate (H)     Traumatic arthritis of left wrist     Chronic pain syndrome     Chronic pain of left knee     Current Outpatient Medications   Medication     ARIPiprazole (ABILIFY) 5 MG tablet     Aspirin-Acetaminophen-Caffeine (EXCEDRIN PO)     diclofenac (VOLTAREN) 1 % topical gel     famotidine (PEPCID) 40 MG tablet     FLUoxetine (PROZAC) 40 MG capsule     gabapentin (NEURONTIN) 300 MG capsule     pantoprazole (PROTONIX) 40 MG EC tablet     pseudoePHEDrine (EQL 12 HOUR DECONGESTANT) 120 MG 12 hr tablet     [START ON 1/26/2021] traMADol (ULTRAM) 50 MG  tablet     traZODone (DESYREL) 100 MG tablet     Current Facility-Administered Medications   Medication     betamethasone acet & sod phos (CELESTONE) injection 6 mg     ropivacaine (NAROPIN) injection 2 mL         Review of Systems   Constitutional, HEENT, cardiovascular, pulmonary, gi and gu systems are negative, except as otherwise noted.      Objective    BP (!) 142/80 (BP Location: Left arm, Patient Position: Sitting, Cuff Size: Adult Small)   Pulse 89   Temp 98.2  F (36.8  C) (Oral)   Wt 48.1 kg (106 lb)   SpO2 100%   BMI 17.96 kg/m    Body mass index is 17.96 kg/m .  Physical Exam   GENERAL: alert and no distress  MS: no gross musculoskeletal defects noted, no edema.  No swelling or warmth or erythema of the left knee.    After discussion of various treatment options, and after reviewing risks and benefits and potential complications, we elected to proceed with a cortisone injection.  The lateral aspect of the Left knee was cleansed and then injected with my usual mixture of 1 cc of 80 mg/cc Depomedrol suspension along with 2 cc of 1% Lidocaine without epi and 2 cc of 0.5% Marcaine.

## 2021-01-15 LAB — PAIN DRUG SCR UR W RPTD MEDS: NORMAL

## 2021-02-08 LAB — COLOGUARD-ABSTRACT: NEGATIVE

## 2021-02-09 ENCOUNTER — TELEPHONE (OUTPATIENT)
Dept: FAMILY MEDICINE | Facility: CLINIC | Age: 61
End: 2021-02-09

## 2021-02-09 DIAGNOSIS — M12.532 TRAUMATIC ARTHRITIS OF LEFT WRIST: ICD-10-CM

## 2021-02-09 NOTE — LETTER
[facility]  [address] [address]  [phone]          February 17, 2021    Nataliia Flanagan                                                                                                                     [address] [id number]            Dear Nataliia,    The medication (RX Tramadol) you were requesting has been denied for an early refill. You can have another refill of your medication 2/25/21 per Meg Bernardo PA-C.        Sincerely,       [employer]

## 2021-02-10 NOTE — TELEPHONE ENCOUNTER
Routing refill request to provider for review/approval because:  Drug not on the [facility] refill protocol   Florinda Chan RN

## 2021-02-11 RX ORDER — TRAMADOL HYDROCHLORIDE 50 MG/1
50 TABLET ORAL EVERY 6 HOURS PRN
Qty: 120 TABLET | Refills: 0 | OUTPATIENT
Start: 2021-02-11

## 2021-02-11 NOTE — TELEPHONE ENCOUNTER
Tramadol was refilled on 1/26/21. It is too soon for another. She can have another refill on 2/25/21  Meg Bernardo PA-C

## 2021-02-17 NOTE — RESULT ENCOUNTER NOTE
Thank you for completing your Cologuard colon cancer screening test. Your test was NEGATIVE, which means you are at low risk for developing colon cancer in the next 3 years. We will repeat this test in 3 years. Please remember if you have any blood in the stool, changes in your bowel habits or other abdominal symptoms you should be seen in clinic and your risks for colon cancer re-evaluated.   Please call the clinic with any questions.

## 2021-03-28 DIAGNOSIS — M12.532 TRAUMATIC ARTHRITIS OF LEFT WRIST: ICD-10-CM

## 2021-03-29 RX ORDER — TRAMADOL HYDROCHLORIDE 50 MG/1
50 TABLET ORAL EVERY 6 HOURS PRN
Qty: 120 TABLET | Refills: 0 | Status: SHIPPED | OUTPATIENT
Start: 2021-03-29 | End: 2021-05-03

## 2021-04-03 ENCOUNTER — VIRTUAL VISIT (OUTPATIENT)
Dept: URGENT CARE | Facility: CLINIC | Age: 61
End: 2021-04-03
Payer: COMMERCIAL

## 2021-04-03 ENCOUNTER — NURSE TRIAGE (OUTPATIENT)
Dept: NURSING | Facility: CLINIC | Age: 61
End: 2021-04-03

## 2021-04-03 DIAGNOSIS — R19.7 DIARRHEA, UNSPECIFIED TYPE: Primary | ICD-10-CM

## 2021-04-03 PROCEDURE — 99213 OFFICE O/P EST LOW 20 MIN: CPT | Mod: 95 | Performed by: FAMILY MEDICINE

## 2021-04-03 RX ORDER — CIPROFLOXACIN 500 MG/1
500 TABLET, FILM COATED ORAL 2 TIMES DAILY
Qty: 10 TABLET | Refills: 0 | Status: SHIPPED | OUTPATIENT
Start: 2021-04-03 | End: 2021-04-08

## 2021-04-03 RX ORDER — INFLUENZA A VIRUS A/VICTORIA/2454/2019 IVR-207 (H1N1) ANTIGEN (PROPIOLACTONE INACTIVATED), INFLUENZA A VIRUS A/HONG KONG/2671/2019 IVR-208 (H3N2) ANTIGEN (PROPIOLACTONE INACTIVATED), INFLUENZA B VIRUS B/VICTORIA/705/2018 BVR-11 ANTIGEN (PROPIOLACTONE INACTIVATED), INFLUENZA B VIRUS B/PHUKET/3073/2013 BVR-1B ANTIGEN (PROPIOLACTONE INACTIVATED) 15; 15; 15; 15 UG/.5ML; UG/.5ML; UG/.5ML; UG/.5ML
1 INJECTION, SUSPENSION INTRAMUSCULAR ONCE
COMMUNITY
Start: 2020-11-11 | End: 2021-04-27

## 2021-04-03 NOTE — PATIENT INSTRUCTIONS
"Patient Education     Bacterial Diarrhea (Adult)    Gastroenteritis is an infection in the intestinal tract. It is sometimes called the \"stomach flu\" but it is not related to influenza. Although most diarrhea is caused by a virus, you have a bacterial infection. It causes diarrhea. Diarrhea is the passing of loose watery stools 3 or more times a day.  Antibiotics are often used to treat this type of infection. Sometimes it is necessary to wait until a stool culture is complete before an antibiotic can be chosen. This may take about 2 days. Certain types of gastroenteritis should not be treated with antibiotics. Such treatment can lead to other problems. Because of this, don't take antibiotics without your healthcare provider's recommendation.   Along with diarrhea, you may have these symptoms:    Abdominal pain and cramping    Nausea and vomiting    Loss of bowel control    Fever and chills    Bloody stools  The main danger of diarrhea is dehydration. Dehydration is the loss of too much water and other fluids from the body without taking in enough fluids to replace it. When this occurs, body fluids must be replaced with oral rehydration solutions. These solutions are available at pharmacies and most grocery stores without a prescription.   Home care  Medicine    If antibiotics were prescribed, be sure you take them as directed until they are finished.    You may use acetaminophen, or NSAIDs such as ibuprofen  to control fever unless another medicine was prescribed. If you have chronic liver or kidney disease or ever had a stomach ulcer or gastrointestinal bleeding, talk with your healthcare provider before using these medicines. Aspirin should never be used in anyone under 18 years of age who is ill with a fever. It may cause severe liver damage. Don't increase your use of NSAID medicines if you are already taking the medicine for another condition (such as arthritis). Don't use NSAIDs if you are on daily aspirin " MY STORY     10/15/19 1400   Parent/Child Requests During Care   My Parent(s)/ Caregiver(s) Names/Relationships Are:  I live with dad, Can and my step-mom but now adopted mom, Shanita   My Sibling(s) Names/Relationships Are:  I have 3 brothers but only one lives with me and he is [place] 5 and a [age] years old   Where I Am From [place]   Special Parent Requests? none   Routine   What Is My Bedtime Routine? I get my pj's on about 830 and get  my bed ready and take my medication and brush my teeth and sometimes floss, and go to bed about 9 and fall asleep about 930 to 10   What Is My Social/Daily Routine? I get up and make my bed and get dressed and do my hair and eat breakfast   Is It Hard For Me To Switch What I Am Doing In A Hurry, Especially If I Am Having A Good Time? No   Social   Nickname Yanira or  because I hate the name Dia   Family Pets a hamster   Where Is Home For Me? I don't know where I consider home   Who Are My Friends? I have some good friends that I absolutely love   What Are My Interests? reading and watching some movies and write in my journals   What Am I Good At? singing and making people happy   What Do I Want To Be When I Grow Up? I don't have any ideas right now   What Would Others Be Surprised To Know About Me? that I have depression but that I am very bubbly and happy looking   Girl/Boyfriend? Siena, 14 and I am interested in dating her   Comfort   What Do I Need To Know To Be Comfortable Before a Procedure? Nothing   What Is My Comfort Item? I have stuffed animals   What Am I Sensitive To, If Anything?   (nothing)   Distraction   What Comforts Me and Helps Calm Me Down? my stuffed animals and anything soft and or sensory like soft dogs and cats and bunnies   What Makes Me Happy? my stuffed animals and bunnies   What Distracts Me? Music   History   What Has Gone On Before With My Health and Family? I am worried about my depression and how hard it is to feel better and I am  therapy (such as for heart disease or after stroke).    Don't take over-the-counter anti-diarrheal medicines, unless advised by your doctor. They can make your bacterial diarrhea worse. This is especially true if you have a fever or the diarrhea is bloody.  Prevent spread of the illness    If symptoms are severe, rest at home for the next 24 hours or until you are feeling better.    Wash your hands with soap and water or use alcohol-based  after touching anyone who is sick, after using the toilet, and before meals.    Clean the toilet after each use.    Don't prepare or serve food to others.  Diet    To prevent dehydration, sip water and clear liquids, soft drinks without caffeine, gingerale, mineral water, decaff tea and coffee. Drink small amounts at a time, don't guzzle it. Sports drinks are not a good choice because they have too much sugar and not enough electrolytes. Commercially available oral rehydration solutions are best.    If you eat, avoid fatty, greasy, spicy, or fried foods.    Don't eat or drink dairy products if having diarrhea, as they can make diarrhea worse.    Caffeine, tobacco, and alcohol can make the diarrhea, cramping, and pain worse. Remember, caffeine is in coffee, chocolate, lashon, some energy drinks, and teas.    Don't force yourself to eat, especially if having cramping, vomiting, or diarrhea. Don't eat large amounts at a time, even if you are hungry  During the first 24 Hours (the first full day) follow the diet below    Beverages: Water, clear liquids, soft drinks without caffeine; gingerale, mineral water (plain or flavored), decaffeinated tea and coffee. Avoid sports drinks.    Soups: Clear broth, consommé and bouillon    Desserts: Plain gelatin, ice pops, and fruit juice bars.  During the next 24 hours (the second day) you may add the following to the above if you have improved    Hot cereal, plain toast, bread, rolls, crackers    Plain noodles, rice, mashed potatoes,  worried about my great grandmother who is getting old  and there is a lot of mental health history on my mom's side of the family and my dad has depression and my mom uses drugs and alcohol.   Life Outside The Hospital   How Can My Caretakers Help Me Get Back To My Life Outside the Hospital? My family supports me but I wish they would talk to me more about anything and not ignore me so  much.      chicken noodle or rice soup    Unsweetened canned fruit like applesauce and bananas (stay away from pineapple and citrus)    Limit fat intake to less than 15 grams per day by skip margarine, butter, oils, mayonnaise, sauces, gravies, fried foods, peanut butter, meat, poultry and fish.    Limit dairy.    Limit fiber; don't eat raw or cooked vegetables, fresh fruits (except bananas) and bran cereals.    Limit caffeine and chocolate. No spices or seasonings except salt.  During the next 24 hours    Gradually resume a normal diet, as you feel better and your symptoms lessen.    If at any time your symptoms get worse, go back to clear liquids until you feel better.  Follow-up care  Follow up with your healthcare provider as advised. Call if you are not improving within 24 hours or if the diarrhea lasts more than one week on antibiotics. If a stool (diarrhea) sample was taken, you may call in 2 days (or as directed) for the results.  When to seek medical advice  Call your healthcare provider right away if any of the following occur:    Increasing abdominal pain or constant lower right abdominal pain    Continued vomiting (unable to keep liquids down)    Frequent diarrhea (more than 5 times a day)    Blood in vomit or stool (black or red color)    Reduced oral intake    Dark urine, reduced urine output    Weakness, dizziness    Drowsiness    Fever of 100.4 F (38 C) or higher, or as directed by your healthcare provider    New rash  High-risk groups  Some people are more prone to risks from diarrhea, like dehydration. These people include the very elderly, those with weak immune systems (for cancer treatment, for example), or those who have inflammatory bowel disease (Crohn's or colitis). If you are in one of these groups, see your healthcare provider right away if your diarrhea symptoms get worse or don't improve with treatment.  Call 911  Call 911 if any of the following occur:    Trouble breathing    Confused    Severe  drowsiness or trouble awakening    Fainting or loss of consciousness    Rapid heart rate    Seizure    Stiff neck  Vish last reviewed this educational content on 3/1/2018    1509-5948 [employer]. All rights reserved. This information is not intended as a substitute for professional medical care. Always follow your healthcare professional's instructions.

## 2021-04-03 NOTE — PROGRESS NOTES
Nataliia is a 60 year old who is being evaluated via a billable telephone visit.      What phone number would you like to be contacted at? [phone]  How would you like to obtain your AVS? MyChart    Assessment & Plan   Diarrhea, unspecified  cipro 500mg BID 5 days    has had before and responds well to cipro treatment.  Last year had bout that lasted 2 weeks and stool studies were negative.  Cleared with cipro.  No travel or known exposures.  Will treat with cipro but would like her to follow up with primary to consider underlying causes to getting these episodes and what is the best treatment protocol for her  Discussed risks and benefits of cipro                  Return in about 2 weeks (around 4/17/2021) for Follow up, with primary provider to discuss diarrhea episodes.    Carol Wagner MD  [facility]    Subjective   Nataliia is a 60 year old who presents for the following health issues     HPI   60 yr old female with complaints of diarrhea this morning.  Urgent - went in her pants.  Denies:  Fever, chills, nausea, vomiting, abdominal pain, blood in stool or black stool.  No recent travel.  Reports this exactly happened last year and it went on for 2 weeks before she was treated with cipro and got better.  Last year thought it could have been from a salad.  Last night had chicken kiev.        Review of Systems   negative except listed in HPI        Objective           Vitals:  No vitals were obtained today due to virtual visit.    Physical Exam   healthy, alert and no distress  PSYCH: Alert and oriented times 3; coherent speech, normal   rate and volume, able to articulate logical thoughts, able   to abstract reason, no tangential thoughts, no hallucinations   or delusions  Her affect is normal  RESP: No cough, no audible wheezing, able to talk in full sentences  Remainder of exam unable to be completed due to telephone visits    No daignostics            Phone call duration: 8  minutes  Chartin minutes

## 2021-04-03 NOTE — TELEPHONE ENCOUNTER
"\"I woke up this morning with diarrhea.   I am going so much I am wearing a diaper. I had this same thing last March(see epic) and they gave me Cipro.   I would like another RX.\"  Denies fever, abdominal pain or other sx  Triaged and advised to be seen within 4 hrs per protocol.  Patient refuses and wants a telephone appt.  Transferred to scheduling to see if there is a virtual or telephone appt available.  Reena Willson RN [facility]    COVID 19 Nurse Triage Plan/Patient Instructions    Please be aware that novel coronavirus (COVID-19) may be circulating in the community. If you develop symptoms such as fever, cough, or SOB or if you have concerns about the presence of another infection including coronavirus (COVID-19), please contact your health care provider or visit [email or web address].     Disposition/Instructions    In-Person Visit with provider recommended. Reference Visit Selection Guide.    Thank you for taking steps to prevent the spread of this virus.  o Limit your contact with others.  o Wear a simple mask to cover your cough.  o Wash your hands well and often.    Resources    [facility]: About COVID-19: [email or web address]    CDC: What to Do If You're Sick: www.cdc.gov/coronavirus/2019-ncov/about/steps-when-sick.html    CDC: Ending Home Isolation: www.cdc.gov/coronavirus/2019-ncov/hcp/disposition-in-home-patients.html     CDC: Caring for Someone: www.cdc.gov/coronavirus/2019-ncov/if-you-are-sick/care-for-someone.html     [facility]: Interim Guidance for Hospital Discharge to Home: www.health.[facility].[place]/diseases/coronavirus/hcp/hospdischarge.pdf    [facility] clinical trials (COVID-19 research studies): [email or web address]/umn-[email or web address]-trials     Below are the COVID-19 hotlines at the [place] Department of Health ([facility]). Interpreters are available.   o For health questions: Call 398-181-9739 or 1-853.630.6768 (7 a.m. to 7 p.m.)  o For questions " about schools and childcare: Call 613-129-3485 or 1-777.250.7881 (7 a.m. to 7 p.m.)                     Additional Information    [1] SEVERE diarrhea (e.g., 7 or more times / day more than normal) AND [2] age > 60 years    Protocols used: DIARRHEA-A-AH

## 2021-04-17 ENCOUNTER — HEALTH MAINTENANCE LETTER (OUTPATIENT)
Age: 61
End: 2021-04-17

## 2021-04-27 ENCOUNTER — OFFICE VISIT (OUTPATIENT)
Dept: PODIATRY | Facility: CLINIC | Age: 61
End: 2021-04-27
Payer: COMMERCIAL

## 2021-04-27 VITALS
DIASTOLIC BLOOD PRESSURE: 80 MMHG | WEIGHT: 105 LBS | BODY MASS INDEX: 17.8 KG/M2 | SYSTOLIC BLOOD PRESSURE: 140 MMHG | HEART RATE: 80 BPM

## 2021-04-27 DIAGNOSIS — B35.1 ONYCHOMYCOSIS: Primary | ICD-10-CM

## 2021-04-27 DIAGNOSIS — L60.0 INGROWING NAIL: ICD-10-CM

## 2021-04-27 LAB
ALBUMIN SERPL-MCNC: 3.4 G/DL (ref 3.4–5)
ALP SERPL-CCNC: 100 U/L (ref 40–150)
ALT SERPL W P-5'-P-CCNC: 36 U/L (ref 0–50)
AST SERPL W P-5'-P-CCNC: 23 U/L (ref 0–45)
BILIRUB DIRECT SERPL-MCNC: <0.1 MG/DL (ref 0–0.2)
BILIRUB SERPL-MCNC: 0.5 MG/DL (ref 0.2–1.3)
PROT SERPL-MCNC: 6.6 G/DL (ref 6.8–8.8)

## 2021-04-27 PROCEDURE — 99204 OFFICE O/P NEW MOD 45 MIN: CPT | Performed by: PODIATRIST

## 2021-04-27 PROCEDURE — 80076 HEPATIC FUNCTION PANEL: CPT | Performed by: PODIATRIST

## 2021-04-27 PROCEDURE — 36415 COLL VENOUS BLD VENIPUNCTURE: CPT | Performed by: PODIATRIST

## 2021-04-27 RX ORDER — TERBINAFINE HYDROCHLORIDE 250 MG/1
250 TABLET ORAL DAILY
Qty: 30 TABLET | Refills: 2 | Status: SHIPPED | OUTPATIENT
Start: 2021-04-27 | End: 2021-08-09

## 2021-04-27 NOTE — PROGRESS NOTES
Subjective:    Pt is seen today as a new pt referral w/ the c/c of a painful ingrown right and left great nail both border.  This has been problematic for many years. negativehistory of drainage from the site. This is slowly getting worse.  Aggravated by activity and relieved by rest.  Has tried soaking which has not helped.   denies history of trauma to the area.  Also notices thickened right hallux nail.  She has had this for 6 months.  It started when she was trying to do ago and ingrown nail.  She is tried putting over-the-counter antifungals on here.    ROS:  A 10-point review of systems was performed and is positive for that noted in the HPI and as seen above.  All other areas are negative.        No Known Allergies    Current Outpatient Medications   Medication Sig Dispense Refill     ARIPiprazole (ABILIFY) 5 MG tablet Take 1 tablet (5 mg) by mouth daily 90 tablet 1     Aspirin-Acetaminophen-Caffeine (EXCEDRIN PO) Take 2 tablets by mouth as needed       diclofenac (VOLTAREN) 1 % topical gel Apply 2 g topically as needed       famotidine (PEPCID) 40 MG tablet TAKE 1 TABLET BY MOUTH EVERYDAY AT BEDTIME 90 tablet 3     FLUoxetine (PROZAC) 40 MG capsule Take 1 capsule (40 mg) by mouth daily 90 capsule 1     gabapentin (NEURONTIN) 300 MG capsule Take 1 capsule (300 mg) by mouth At Bedtime 90 capsule 1     pantoprazole (PROTONIX) 40 MG EC tablet Take 1 tablet (40 mg) by mouth daily 90 tablet 3     pseudoePHEDrine (EQL 12 HOUR DECONGESTANT) 120 MG 12 hr tablet TAKE ONE TABLET BY MOUTH EVERY TWELVE HOURS 60 tablet 5     terbinafine (LAMISIL) 250 MG tablet Take 1 tablet (250 mg) by mouth daily 30 tablet 2     traMADol (ULTRAM) 50 MG tablet TAKE 1 TABLET (50 MG) BY MOUTH EVERY 6 HOURS AS NEEDED FOR SEVERE PAIN 120 tablet 0     traZODone (DESYREL) 100 MG tablet TAKE 1 TABLET BY MOUTH EVERY DAY AT BEDTIME AS NEEDED FOR SLEEP 90 tablet 1       Patient Active Problem List   Diagnosis     Chronic rhinitis     Malnutrition  (H)     Iron deficiency anemia     Distal radius fracture     Joint inflammation of wrist - left     Gastroesophageal reflux disease without esophagitis     Major depressive disorder, recurrent episode, moderate (H)     Traumatic arthritis of left wrist     Chronic pain syndrome     Chronic pain of left knee       Past Medical History:   Diagnosis Date     Depression     followed by PSYCH     Migraines        Past Surgical History:   Procedure Laterality Date     COSMETIC SURGERY      breast implants     ENDOSCOPY  12-9-14     ENT SURGERY      tonsilectomy     GENITOURINARY SURGERY      tubes tied       Family History   Problem Relation Age of Onset     Diabetes Father      Hypertension Father      Hyperlipidemia Father      Other Cancer Father      Thyroid Disease Sister      Thyroid Disease Sister        Social History     Tobacco Use     Smoking status: Never Smoker     Smokeless tobacco: Never Used   Substance Use Topics     Alcohol use: No         Exam:    Vitals: BP (!) 140/80   Pulse 80   Wt 47.6 kg (105 lb)   BMI 17.80 kg/m    BMI: Body mass index is 17.8 kg/m .  Height: Data Unavailable    Constitutional/ general:  Pt is in no apparent distress, appears well-nourished.  Cooperative with history and physical exam.     Psych:  The patient answered questions appropriately.  Normal affect.  Seems to have reasonable expectations, in terms of treatment.     Eyes:  Visual scanning/ tracking without deficit.     Ears:  Response to auditory stimuli is normal.  negative hearing aid devices.  Auricles in proper alignment.     Lymphatic:  Popliteal lymph nodes not enlarged.     Lungs:  Non labored breathing, non labored speech. No cough.  No audible wheezing. Even, quiet breathing.       Vascular:  positive pedal pulses bilaterally for both the DP and PT arteries.  CFT < 3 sec.  negative ankle edema.  positive pedal hair growth.    Neuro:  Alert and oriented x 3. Coordinated gait.  Light touch sensation is intact to  the L4, L5, S1 distributions. No obvious deficits.  No evidence of neurological-based weakness, spasticity, or contracture in the lower extremities.     Derm: Normal texture and turgor.  No erythema, ecchymosis, or cyanosis.  Right hallux nail distal one half mycotic    Musculoskeletal:    Lower extremity muscle strength is normal.  Patient is ambulatory without an assistive device or brace.  Normal arch with weightbearing.  No forefoot or rear foot deformities noted.   Normal ROM all fore foot and rearfoot joints.  No equinus.  right and left great toe nail both border shows soft tissue impingement with localized erythema.   negative active drainage/purulence at this time.  No sinus tracts.  No nailbed masses or exostosis.  No pain with range of motion of IPJ or MTPJ.  No ascending cellulitis.    ASSESSMENT:    Onychocryptosis with paronychia right and left great both border.    Right hallux onychomycosis    Discussed all options with patient regarding onychomycosis.  Discussed this is a cosmetic problem and does not necessarily need to be treated.  Would like to try lamisil.  Risks, complications, and efficacy of going on this pill were discussed with the patient.  Will start lamisil 250mg, dispense 30, one per oral every day.  Two refills.  Will get LFTs today for baseline.  RTC in 3 months.    Discussed etiology and treatment options in detail w/ the pt.  The potential causes and nature of an ingrown toenail were discussed with the patient.  We reviewed the natural history/prognosis of the condition and potential risks if no treatment is provided.      Treatment options discussed included conservative management (oral antibiotics, soaking of foot, adequate width shoes)  as well as surgical management (partial or total nail removal).  The pros and cons of both forms of treatment were reviewed.  Handout given to patient.      After thorough discussion and answering all questions, the patient elected to have  permanent removal of affected nail border.  Risk complications and efficacy discussed with patient.  We will set up 1 hour appointment to have this done in the future.    Laen Herrera DPM, FACFAS

## 2021-04-27 NOTE — LETTER
4/27/2021         RE: Nataliia Flanagan  [address] [place] [address]        Dear Colleague,    Thank you for referring your patient, Nataliia Flanagan, to the [facility]. Please see a copy of my visit note below.    Subjective:    Pt is seen today as a new pt referral w/ the c/c of a painful ingrown right and left great nail both border.  This has been problematic for many years. negativehistory of drainage from the site. This is slowly getting worse.  Aggravated by activity and relieved by rest.  Has tried soaking which has not helped.   denies history of trauma to the area.  Also notices thickened right hallux nail.  She has had this for 6 months.  It started when she was trying to do ago and ingrown nail.  She is tried putting over-the-counter antifungals on here.    ROS:  A 10-point review of systems was performed and is positive for that noted in the HPI and as seen above.  All other areas are negative.        No Known Allergies    Current Outpatient Medications   Medication Sig Dispense Refill     ARIPiprazole (ABILIFY) 5 MG tablet Take 1 tablet (5 mg) by mouth daily 90 tablet 1     Aspirin-Acetaminophen-Caffeine (EXCEDRIN PO) Take 2 tablets by mouth as needed       diclofenac (VOLTAREN) 1 % topical gel Apply 2 g topically as needed       famotidine (PEPCID) 40 MG tablet TAKE 1 TABLET BY MOUTH EVERYDAY AT BEDTIME 90 tablet 3     FLUoxetine (PROZAC) 40 MG capsule Take 1 capsule (40 mg) by mouth daily 90 capsule 1     gabapentin (NEURONTIN) 300 MG capsule Take 1 capsule (300 mg) by mouth At Bedtime 90 capsule 1     pantoprazole (PROTONIX) 40 MG EC tablet Take 1 tablet (40 mg) by mouth daily 90 tablet 3     pseudoePHEDrine (EQL 12 HOUR DECONGESTANT) 120 MG 12 hr tablet TAKE ONE TABLET BY MOUTH EVERY TWELVE HOURS 60 tablet 5     terbinafine (LAMISIL) 250 MG tablet Take 1 tablet (250 mg) by mouth daily 30 tablet 2     traMADol (ULTRAM) 50 MG tablet TAKE 1 TABLET (50 MG)  BY MOUTH EVERY 6 HOURS AS NEEDED FOR SEVERE PAIN 120 tablet 0     traZODone (DESYREL) 100 MG tablet TAKE 1 TABLET BY MOUTH EVERY DAY AT BEDTIME AS NEEDED FOR SLEEP 90 tablet 1       Patient Active Problem List   Diagnosis     Chronic rhinitis     Malnutrition (H)     Iron deficiency anemia     Distal radius fracture     Joint inflammation of wrist - left     Gastroesophageal reflux disease without esophagitis     Major depressive disorder, recurrent episode, moderate (H)     Traumatic arthritis of left wrist     Chronic pain syndrome     Chronic pain of left knee       Past Medical History:   Diagnosis Date     Depression     followed by PSYCH     Migraines        Past Surgical History:   Procedure Laterality Date     COSMETIC SURGERY      breast implants     ENDOSCOPY  12-9-14     ENT SURGERY      tonsilectomy     GENITOURINARY SURGERY      tubes tied       Family History   Problem Relation Age of Onset     Diabetes Father      Hypertension Father      Hyperlipidemia Father      Other Cancer Father      Thyroid Disease Sister      Thyroid Disease Sister        Social History     Tobacco Use     Smoking status: Never Smoker     Smokeless tobacco: Never Used   Substance Use Topics     Alcohol use: No         Exam:    Vitals: BP (!) 140/80   Pulse 80   Wt 47.6 kg (105 lb)   BMI 17.80 kg/m    BMI: Body mass index is 17.8 kg/m .  Height: Data Unavailable    Constitutional/ general:  Pt is in no apparent distress, appears well-nourished.  Cooperative with history and physical exam.     Psych:  The patient answered questions appropriately.  Normal affect.  Seems to have reasonable expectations, in terms of treatment.     Eyes:  Visual scanning/ tracking without deficit.     Ears:  Response to auditory stimuli is normal.  negative hearing aid devices.  Auricles in proper alignment.     Lymphatic:  Popliteal lymph nodes not enlarged.     Lungs:  Non labored breathing, non labored speech. No cough.  No audible wheezing.  Even, quiet breathing.       Vascular:  positive pedal pulses bilaterally for both the DP and PT arteries.  CFT < 3 sec.  negative ankle edema.  positive pedal hair growth.    Neuro:  Alert and oriented x 3. Coordinated gait.  Light touch sensation is intact to the L4, L5, S1 distributions. No obvious deficits.  No evidence of neurological-based weakness, spasticity, or contracture in the lower extremities.     Derm: Normal texture and turgor.  No erythema, ecchymosis, or cyanosis.  Right hallux nail distal one half mycotic    Musculoskeletal:    Lower extremity muscle strength is normal.  Patient is ambulatory without an assistive device or brace.  Normal arch with weightbearing.  No forefoot or rear foot deformities noted.   Normal ROM all fore foot and rearfoot joints.  No equinus.  right and left great toe nail both border shows soft tissue impingement with localized erythema.   negative active drainage/purulence at this time.  No sinus tracts.  No nailbed masses or exostosis.  No pain with range of motion of IPJ or MTPJ.  No ascending cellulitis.    ASSESSMENT:    Onychocryptosis with paronychia right and left great both border.    Right hallux onychomycosis    Discussed all options with patient regarding onychomycosis.  Discussed this is a cosmetic problem and does not necessarily need to be treated.  Would like to try lamisil.  Risks, complications, and efficacy of going on this pill were discussed with the patient.  Will start lamisil 250mg, dispense 30, one per oral every day.  Two refills.  Will get LFTs today for baseline.  RTC in 3 months.    Discussed etiology and treatment options in detail w/ the pt.  The potential causes and nature of an ingrown toenail were discussed with the patient.  We reviewed the natural history/prognosis of the condition and potential risks if no treatment is provided.      Treatment options discussed included conservative management (oral antibiotics, soaking of foot,  adequate width shoes)  as well as surgical management (partial or total nail removal).  The pros and cons of both forms of treatment were reviewed.  Handout given to patient.      After thorough discussion and answering all questions, the patient elected to have permanent removal of affected nail border.  Risk complications and efficacy discussed with patient.  We will set up 1 hour appointment to have this done in the future.    Lane Herrera DPM, FACFAS          Again, thank you for allowing me to participate in the care of your patient.        Sincerely,        Lane Herrera DPM

## 2021-04-27 NOTE — PATIENT INSTRUCTIONS
We wish you continued good healing. If you have any questions or concerns, please do not hesitate to contact us at [phone]    [employer] (secure e-mail communication and access to your chart) to send a message or to make an appointment.    Please remember to call and schedule a follow up appointment if one was recommended at your earliest convenience.     +++OF MARCH 2020+++ LOCATION AND HOURS HAVE CHANGED    PLEASE CALL CLINICS TO VERIFY DAYS AND TIMES  PODIATRY CLINIC HOURS  TELEPHONE NUMBER    Dr. Lane KELSEYPDWAYNE [facility]        Clinics:  [facility]   Tuesday 1PM-6PM  [place]  Wednesday 745AM-330PM  [place]  Thursday/Friday 745AM-230PM  [place] APPOINTMENTS  [phone]    [place] APPOINTMENTS  [phone]          If you need a medication refill, please contact us you may need lab work and/or a follow up visit prior to your refill (i.e. Antifungal medications).    If MRI needed please call Imaging at [phone] or [phone]    HOW DO I GET MY KNEE SCOOTER? Knee scooters can be picked up at ANY Medical Supply stores with your knee scooter Prescription.  OR    Bring your signed prescription to an [facility] Medical Equipment showroom.

## 2021-04-30 ENCOUNTER — TELEPHONE (OUTPATIENT)
Dept: PODIATRY | Facility: CLINIC | Age: 61
End: 2021-04-30

## 2021-04-30 ENCOUNTER — OFFICE VISIT (OUTPATIENT)
Dept: PODIATRY | Facility: CLINIC | Age: 61
End: 2021-04-30
Payer: COMMERCIAL

## 2021-04-30 VITALS — HEART RATE: 90 BPM | OXYGEN SATURATION: 99 %

## 2021-04-30 DIAGNOSIS — M12.532 TRAUMATIC ARTHRITIS OF LEFT WRIST: ICD-10-CM

## 2021-04-30 DIAGNOSIS — L60.0 INGROWING NAIL: Primary | ICD-10-CM

## 2021-04-30 PROCEDURE — 11750 EXCISION NAIL&NAIL MATRIX: CPT | Mod: 51 | Performed by: PODIATRIST

## 2021-04-30 NOTE — TELEPHONE ENCOUNTER
Called and spoke to patient.  Patient states that she is experiencing pain in bilateral toes.   Patient was seen today in clinic for removal.   Patient denies fever, redness, or excessive swelling.   Patient states she has not been icing of elevating extremities.   Advised patient to follow the recommendations provided today that includes elevation and ice.   Encouraged to take tylenol not to exceed 4000 mg in a 24 hour period.   Patient also has Tramadol and can use that for breakthrough pain as needed. Caller agrees.  Tonya White RN

## 2021-04-30 NOTE — TELEPHONE ENCOUNTER
Reason for call:  Symptom   Symptom or request: patient is having more pain. She would like something stronger then the over the counter medication.    She said the novocaine is wearing off and it is getting painful.     Duration (how long have symptoms been present): today  Have you been treated for this before? Yes    Additional comments:    [id number] - [place], [place] - [address]      Phone number to reach patient:  Cell number on file:    Telephone Information:   Mobile [phone]     Best Time:      Can we leave a detailed message on this number?  YES    Travel screening: Not Applicable

## 2021-04-30 NOTE — LETTER
May 6, 2021      Nataliia Flanagan  [address] [facility] [address]            Your provider has sent a 30 day amna refill of traMADol (ULTRAM) 50 MG tablet. You are due for an appointment for further refills. Please contact the clinic to schedule an appointment for further refills.      Sincerely,       [facility] / [facility]

## 2021-04-30 NOTE — PROGRESS NOTES
Subjective:    Pt is seen today w/ the c/c of a painful ingrown right and left great nail both border.  Her to have permanent removal today.      ROS: see above         No Known Allergies    Current Outpatient Medications   Medication Sig Dispense Refill     ARIPiprazole (ABILIFY) 5 MG tablet Take 1 tablet (5 mg) by mouth daily 90 tablet 1     Aspirin-Acetaminophen-Caffeine (EXCEDRIN PO) Take 2 tablets by mouth as needed       diclofenac (VOLTAREN) 1 % topical gel Apply 2 g topically as needed       famotidine (PEPCID) 40 MG tablet TAKE 1 TABLET BY MOUTH EVERYDAY AT BEDTIME 90 tablet 3     FLUoxetine (PROZAC) 40 MG capsule Take 1 capsule (40 mg) by mouth daily 90 capsule 1     gabapentin (NEURONTIN) 300 MG capsule Take 1 capsule (300 mg) by mouth At Bedtime 90 capsule 1     pantoprazole (PROTONIX) 40 MG EC tablet Take 1 tablet (40 mg) by mouth daily 90 tablet 3     pseudoePHEDrine (EQL 12 HOUR DECONGESTANT) 120 MG 12 hr tablet TAKE ONE TABLET BY MOUTH EVERY TWELVE HOURS 60 tablet 5     terbinafine (LAMISIL) 250 MG tablet Take 1 tablet (250 mg) by mouth daily 30 tablet 2     traMADol (ULTRAM) 50 MG tablet TAKE 1 TABLET (50 MG) BY MOUTH EVERY 6 HOURS AS NEEDED FOR SEVERE PAIN 120 tablet 0     traZODone (DESYREL) 100 MG tablet TAKE 1 TABLET BY MOUTH EVERY DAY AT BEDTIME AS NEEDED FOR SLEEP 90 tablet 1       Patient Active Problem List   Diagnosis     Chronic rhinitis     Malnutrition (H)     Iron deficiency anemia     Distal radius fracture     Joint inflammation of wrist - left     Gastroesophageal reflux disease without esophagitis     Major depressive disorder, recurrent episode, moderate (H)     Traumatic arthritis of left wrist     Chronic pain syndrome     Chronic pain of left knee       Past Medical History:   Diagnosis Date     Depression     followed by PSYCH     Migraines        Past Surgical History:   Procedure Laterality Date     COSMETIC SURGERY      breast implants     ENDOSCOPY  12-9-14     ENT SURGERY       tonsilectomy     GENITOURINARY SURGERY      tubes tied       Family History   Problem Relation Age of Onset     Diabetes Father      Hypertension Father      Hyperlipidemia Father      Other Cancer Father      Thyroid Disease Sister      Thyroid Disease Sister        Social History     Tobacco Use     Smoking status: Never Smoker     Smokeless tobacco: Never Used   Substance Use Topics     Alcohol use: No         Exam:    Vitals: There were no vitals taken for this visit.  BMI: There is no height or weight on file to calculate BMI.  Height: Data Unavailable    Constitutional/ general:  Pt is in no apparent distress, appears well-nourished.  Cooperative with history and physical exam.     Psych:  The patient answered questions appropriately.  Normal affect.  Seems to have reasonable expectations, in terms of treatment.     Eyes:  Visual scanning/ tracking without deficit.     Ears:  Response to auditory stimuli is normal.  negative hearing aid devices.  Auricles in proper alignment.     Lymphatic:  Popliteal lymph nodes not enlarged.     Lungs:  Non labored breathing, non labored speech. No cough.  No audible wheezing. Even, quiet breathing.       Vascular:  positive pedal pulses bilaterally for both the DP and PT arteries.  CFT < 3 sec.  negative ankle edema.  positive pedal hair growth.    Neuro:  Alert and oriented x 3. Coordinated gait.  Light touch sensation is intact to the L4, L5, S1 distributions. No obvious deficits.  No evidence of neurological-based weakness, spasticity, or contracture in the lower extremities.     Derm: Normal texture and turgor.  No erythema, ecchymosis, or cyanosis.      Musculoskeletal:    Lower extremity muscle strength is normal.  Patient is ambulatory without an assistive device or brace.  Normal arch with weightbearing.  No forefoot or rear foot deformities noted.   Normal ROM all fore foot and rearfoot joints.  No equinus.  right and left great toe nail both border shows soft  tissue impingement with localized erythema.   negative active drainage/purulence at this time.  No sinus tracts.  No nailbed masses or exostosis.  No pain with range of motion of IPJ or MTPJ.  No ascending cellulitis.    ASSESSMENT:    Onychocryptosis with paronychia right and left great both border.    Discussed etiology and treatment options in detail w/ the pt.  The potential causes and nature of an ingrown toenail were discussed with the patient.  We reviewed the natural history/prognosis of the condition and potential risks if no treatment is provided.      Treatment options discussed included conservative management (oral antibiotics, soaking of foot, adequate width shoes)  as well as surgical management (partial or total nail removal).  The pros and cons of both forms of treatment were reviewed.  Handout given to patient.      After thorough discussion and answering all questions, the patient elected to have permanent removal of affected nail border.  Risk complications and efficacy discussed with patient.  Obtained consent, used 3cc of 1% lidocaine plain to block right and left great toe.  Sterile prep, then avulsed the affected border.  No evidence of deep abscess noted.  Phenol was applied times 3 at 3o second intervals with curettage in between and then alcohol rinse.  Pt tolerated procedure well.  Sterile bandage placed, gave wound care instruction.  Return to clinic prn    Lane Herrera DPM, FACFAS

## 2021-04-30 NOTE — PATIENT INSTRUCTIONS
INGROWN TOENAIL REMOVAL AFTERCARE ~PERMANENT NAIL REMOVAL      Go directly home and elevate the affected foot for the remainder of the day/evening if possible. Your toe may stay numb anywhere from 2-8 hours.     For pain take Tylenol, ibuprofen or another anti-inflammatory as needed for pain. You may apply ice on top of your foot behind he base of the toes, BUT, NOT ON IT.    That evening of the procedure, or morning after procedure.  you may remove the bandage. Began soaking foot three times a day (10-15 min each time) in lukewarm water with a pinch of salt. Epson or table salt     You may have  to do this for 6-8 weeks if Phenol was used during the procedure.    After soaks, pat the area dry. Apply antibiotic ointment to the area and cover with a band-aid.    The following day. Find a shoe that is comfortable and minimizes the amount of rubbing on your toe, as this increases pain.    Dress with band-aids until no longer draining. Those that have had the phenol procedure, the toe will drain longer and will look like it is infected because it is a chemical burn.    Watch for any signs and symptoms of infection such as: redness, red streaks going up the foot/leg, swelling, pus or foul odor. Fevers > 101   Please call with questions.    Dr. Lane MONTGOMERY FAS      We wish you continued good healing. If you have any questions or concerns, please do not hesitate to contact us at [phone]    [employer] (secure e-mail communication and access to your chart) to send a message or to make an appointment.    Please remember to call and schedule a follow up appointment if one was recommended at your earliest convenience.     +++OF MARCH 2020+++ LOCATION AND HOURS HAVE CHANGED    PLEASE CALL CLINICS TO VERIFY DAYS AND TIMES  PODIATRY CLINIC HOURS  TELEPHONE NUMBER    Dr. Lane MUÑOZ        Clinics:  [facility]   Tuesday 1PM-6PM  [place]  Wednesday  745AM-330PM  [facility]  Thursday/Friday 745AM-230PM  [facility] APPOINTMENTS  [phone]    [place] APPOINTMENTS  [phone]          If you need a medication refill, please contact us you may need lab work and/or a follow up visit prior to your refill (i.e. Antifungal medications).    If MRI needed please call Imaging at [phone] or [phone]    HOW DO I GET MY KNEE SCOOTER? Knee scooters can be picked up at ANY Medical Supply stores with your knee scooter Prescription.  OR    Bring your signed prescription to an [facility] Medical Equipment showroom.          INGROWN TOENAIL REMOVAL AFTERCARE ~PERMANENT NAIL REMOVAL      Go directly home and elevate the affected foot for the remainder of the day/evening if possible. Your toe may stay numb anywhere from 2-8 hours.     For pain take Tylenol, ibuprofen or another anti-inflammatory as needed for pain. You may apply ice on top of your foot behind he base of the toes, BUT, NOT ON IT.    That evening of the procedure, or morning after procedure.  you may remove the bandage. Began soaking foot three times a day (10-15 min each time) in lukewarm water with a pinch of salt. Epson or table salt     You may have  to do this for 6-8 weeks if Phenol was used during the procedure.    After soaks, pat the area dry. Apply antibiotic ointment to the area and cover with a band-aid.    The following day. Find a shoe that is comfortable and minimizes the amount of rubbing on your toe, as this increases pain.    Dress with band-aids until no longer draining. Those that have had the phenol procedure, the toe will drain longer and will look like it is infected because it is a chemical burn.    Watch for any signs and symptoms of infection such as: redness, red streaks going up the foot/leg, swelling, pus or foul odor. Fevers > 101   Please call with questions.    Dr. Lane Herrera D.P.M FAC FAS      We wish you continued good  healing. If you have any questions or concerns, please do not hesitate to contact us at [phone]    "[employer] (secure e-mail communication and access to your chart) to send a message or to make an appointment.    Please remember to call and schedule a follow up appointment if one was recommended at your earliest convenience.     +++OF MARCH 2020+++ LOCATION AND HOURS HAVE CHANGED    PLEASE CALL CLINICS TO VERIFY DAYS AND TIMES  PODIATRY CLINIC HOURS  TELEPHONE NUMBER    Dr. Lane KELSEYPDWAYNE [facility]        Clinics:  [facility]   Tuesday 1PM-6PM  [place]  Wednesday 745AM-330PM  [place]  Thursday/Friday 745AM-230PM  [place] APPOINTMENTS  [phone]    [place] APPOINTMENTS  [phone]          If you need a medication refill, please contact us you may need lab work and/or a follow up visit prior to your refill (i.e. Antifungal medications).    If MRI needed please call Imaging at [phone] or [phone]    HOW DO I GET MY KNEE SCOOTER? Knee scooters can be picked up at ANY Medical Supply stores with your knee scooter Prescription.  OR    Bring your signed prescription to an [facility] Medical Equipment showroom.

## 2021-04-30 NOTE — LETTER
4/30/2021         RE: Nataliia Flanagan  [address] [facility] [address]        Dear Colleague,    Thank you for referring your patient, Nataliia Flanagan, to the [facility]. Please see a copy of my visit note below.    Subjective:    Pt is seen today w/ the c/c of a painful ingrown right and left great nail both border.  Her to have permanent removal today.      ROS: see above         No Known Allergies    Current Outpatient Medications   Medication Sig Dispense Refill     ARIPiprazole (ABILIFY) 5 MG tablet Take 1 tablet (5 mg) by mouth daily 90 tablet 1     Aspirin-Acetaminophen-Caffeine (EXCEDRIN PO) Take 2 tablets by mouth as needed       diclofenac (VOLTAREN) 1 % topical gel Apply 2 g topically as needed       famotidine (PEPCID) 40 MG tablet TAKE 1 TABLET BY MOUTH EVERYDAY AT BEDTIME 90 tablet 3     FLUoxetine (PROZAC) 40 MG capsule Take 1 capsule (40 mg) by mouth daily 90 capsule 1     gabapentin (NEURONTIN) 300 MG capsule Take 1 capsule (300 mg) by mouth At Bedtime 90 capsule 1     pantoprazole (PROTONIX) 40 MG EC tablet Take 1 tablet (40 mg) by mouth daily 90 tablet 3     pseudoePHEDrine (EQL 12 HOUR DECONGESTANT) 120 MG 12 hr tablet TAKE ONE TABLET BY MOUTH EVERY TWELVE HOURS 60 tablet 5     terbinafine (LAMISIL) 250 MG tablet Take 1 tablet (250 mg) by mouth daily 30 tablet 2     traMADol (ULTRAM) 50 MG tablet TAKE 1 TABLET (50 MG) BY MOUTH EVERY 6 HOURS AS NEEDED FOR SEVERE PAIN 120 tablet 0     traZODone (DESYREL) 100 MG tablet TAKE 1 TABLET BY MOUTH EVERY DAY AT BEDTIME AS NEEDED FOR SLEEP 90 tablet 1       Patient Active Problem List   Diagnosis     Chronic rhinitis     Malnutrition (H)     Iron deficiency anemia     Distal radius fracture     Joint inflammation of wrist - left     Gastroesophageal reflux disease without esophagitis     Major depressive disorder, recurrent episode, moderate (H)     Traumatic arthritis of left wrist     Chronic pain syndrome      Chronic pain of left knee       Past Medical History:   Diagnosis Date     Depression     followed by PSYCH     Migraines        Past Surgical History:   Procedure Laterality Date     COSMETIC SURGERY      breast implants     ENDOSCOPY  12-9-14     ENT SURGERY      tonsilectomy     GENITOURINARY SURGERY      tubes tied       Family History   Problem Relation Age of Onset     Diabetes Father      Hypertension Father      Hyperlipidemia Father      Other Cancer Father      Thyroid Disease Sister      Thyroid Disease Sister        Social History     Tobacco Use     Smoking status: Never Smoker     Smokeless tobacco: Never Used   Substance Use Topics     Alcohol use: No         Exam:    Vitals: There were no vitals taken for this visit.  BMI: There is no height or weight on file to calculate BMI.  Height: Data Unavailable    Constitutional/ general:  Pt is in no apparent distress, appears well-nourished.  Cooperative with history and physical exam.     Psych:  The patient answered questions appropriately.  Normal affect.  Seems to have reasonable expectations, in terms of treatment.     Eyes:  Visual scanning/ tracking without deficit.     Ears:  Response to auditory stimuli is normal.  negative hearing aid devices.  Auricles in proper alignment.     Lymphatic:  Popliteal lymph nodes not enlarged.     Lungs:  Non labored breathing, non labored speech. No cough.  No audible wheezing. Even, quiet breathing.       Vascular:  positive pedal pulses bilaterally for both the DP and PT arteries.  CFT < 3 sec.  negative ankle edema.  positive pedal hair growth.    Neuro:  Alert and oriented x 3. Coordinated gait.  Light touch sensation is intact to the L4, L5, S1 distributions. No obvious deficits.  No evidence of neurological-based weakness, spasticity, or contracture in the lower extremities.     Derm: Normal texture and turgor.  No erythema, ecchymosis, or cyanosis.      Musculoskeletal:    Lower extremity muscle strength is  normal.  Patient is ambulatory without an assistive device or brace.  Normal arch with weightbearing.  No forefoot or rear foot deformities noted.   Normal ROM all fore foot and rearfoot joints.  No equinus.  right and left great toe nail both border shows soft tissue impingement with localized erythema.   negative active drainage/purulence at this time.  No sinus tracts.  No nailbed masses or exostosis.  No pain with range of motion of IPJ or MTPJ.  No ascending cellulitis.    ASSESSMENT:    Onychocryptosis with paronychia right and left great both border.    Discussed etiology and treatment options in detail w/ the pt.  The potential causes and nature of an ingrown toenail were discussed with the patient.  We reviewed the natural history/prognosis of the condition and potential risks if no treatment is provided.      Treatment options discussed included conservative management (oral antibiotics, soaking of foot, adequate width shoes)  as well as surgical management (partial or total nail removal).  The pros and cons of both forms of treatment were reviewed.  Handout given to patient.      After thorough discussion and answering all questions, the patient elected to have permanent removal of affected nail border.  Risk complications and efficacy discussed with patient.  Obtained consent, used 3cc of 1% lidocaine plain to block right and left great toe.  Sterile prep, then avulsed the affected border.  No evidence of deep abscess noted.  Phenol was applied times 3 at 3o second intervals with curettage in between and then alcohol rinse.  Pt tolerated procedure well.  Sterile bandage placed, gave wound care instruction.  Return to clinic prn    Lane Herrera DPM, FACFAS          Again, thank you for allowing me to participate in the care of your patient.        Sincerely,        Lane Herrera DPM

## 2021-05-03 RX ORDER — TRAMADOL HYDROCHLORIDE 50 MG/1
50 TABLET ORAL EVERY 6 HOURS PRN
Qty: 120 TABLET | Refills: 0 | Status: SHIPPED | OUTPATIENT
Start: 2021-05-03 | End: 2021-06-04

## 2021-05-04 ENCOUNTER — VIRTUAL VISIT (OUTPATIENT)
Dept: FAMILY MEDICINE | Facility: CLINIC | Age: 61
End: 2021-05-04
Payer: COMMERCIAL

## 2021-05-04 DIAGNOSIS — G47.00 PERSISTENT INSOMNIA: ICD-10-CM

## 2021-05-04 DIAGNOSIS — G89.29 CHRONIC PAIN OF LEFT WRIST: ICD-10-CM

## 2021-05-04 DIAGNOSIS — M25.532 CHRONIC PAIN OF LEFT WRIST: ICD-10-CM

## 2021-05-04 DIAGNOSIS — F33.1 MAJOR DEPRESSIVE DISORDER, RECURRENT EPISODE, MODERATE (H): ICD-10-CM

## 2021-05-04 DIAGNOSIS — M19.132 POST-TRAUMATIC OSTEOARTHRITIS OF LEFT WRIST: ICD-10-CM

## 2021-05-04 PROCEDURE — 99214 OFFICE O/P EST MOD 30 MIN: CPT | Mod: 95 | Performed by: PHYSICIAN ASSISTANT

## 2021-05-04 RX ORDER — GABAPENTIN 300 MG/1
300 CAPSULE ORAL AT BEDTIME
Qty: 90 CAPSULE | Refills: 1 | Status: SHIPPED | OUTPATIENT
Start: 2021-05-04 | End: 2021-08-09

## 2021-05-04 RX ORDER — FLUOXETINE 40 MG/1
40 CAPSULE ORAL DAILY
Qty: 90 CAPSULE | Refills: 1 | Status: SHIPPED | OUTPATIENT
Start: 2021-05-04 | End: 2021-08-09

## 2021-05-04 RX ORDER — TRAZODONE HYDROCHLORIDE 100 MG/1
TABLET ORAL
Qty: 90 TABLET | Refills: 1 | Status: SHIPPED | OUTPATIENT
Start: 2021-05-04 | End: 2022-01-28

## 2021-05-04 ASSESSMENT — PATIENT HEALTH QUESTIONNAIRE - PHQ9: SUM OF ALL RESPONSES TO PHQ QUESTIONS 1-9: 3

## 2021-05-04 NOTE — PROGRESS NOTES
Nataliia is a 60 year old who is being evaluated via a billable telephone visit.      What phone number would you like to be contacted at? [phone]  How would you like to obtain your AVS? MyChart    Assessment & Plan     Chronic pain of left wrist  - gabapentin (NEURONTIN) 300 MG capsule; Take 1 capsule (300 mg) by mouth At Bedtime    Post-traumatic osteoarthritis of left wrist  - gabapentin (NEURONTIN) 300 MG capsule; Take 1 capsule (300 mg) by mouth At Bedtime    Major depressive disorder, recurrent episode, moderate (H)  - FLUoxetine (PROZAC) 40 MG capsule; Take 1 capsule (40 mg) by mouth daily    Persistent insomnia  - traZODone (DESYREL) 100 MG tablet; TAKE 1 TABLET BY MOUTH EVERY DAY AT BEDTIME AS NEEDED FOR SLEEP    Patient with chronic pain, well controlled on tramadol and voltaren. Has been previously evaluated by the pain team.   Patient will schedule mammogram.   Send copy of her COVID-19 vaccine card.   Mood stable. Refilled.                    No follow-ups on file.    Meg Bernardo PA-C  [facility]   Nataliia is a 60 year old who presents for the following health issues     HPI     Chronic Pain Follow-Up    Where in your body do you have pain? Left wrist  How has your pain affected your ability to work? Can work part time without limitations   What type of work do you or did you do?   Which of these pain treatments have you tried since your last clinic visit? Other: topical cream  How well are you sleeping? Good  How has your mood been since your last visit? About the same  Have you had a significant life event? No  Other aggravating factors: repetitive activities - movements  Taking medication as directed? Yes    PHQ-9 SCORE 5/16/2019 12/10/2019 9/21/2020   PHQ-9 Total Score - - -   PHQ-9 Total Score 5 8 8     CAROLYN-7 SCORE 8/3/2016   Total Score 17     No flowsheet data found.  Encounter-Level CSA:    There are no encounter-level csa.      Patient-Level CSA:    Controlled Substance Agreement - Opioid - Scan on 1/12/2021  7:50 AM  Controlled Substance Agreement - Opioid - Scan on 7/15/2019  1:42 PM         How many servings of fruits and vegetables do you eat daily?  0-1    On average, how many sweetened beverages do you drink each day (Examples: soda, juice, sweet tea, etc.  Do NOT count diet or artificially sweetened beverages)?   3    How many days per week do you exercise enough to make your heart beat faster? 3 or less    How many minutes a day do you exercise enough to make your heart beat faster? 9 or less    How many days per week do you miss taking your medication? 0    Pain has been pretty good. She is still using the volatren gel a few times a day.   Tramadol- 1 in morning, 2 in afternoon and 1 before bed. Sleeps through the night well.   Always takes 4 per day.   Gabapentin is ok for her mood and helping her pain.   Knee injections helps but it does wear off after awhile -wearing off now it has been 3 months since last injections.     Patient notes that she recieved the [employer] and [employer] COVID 19 vaccine on 3/25/21. [facility] notes she had 2 moderna vaccines. Patient notes this is incorrect and she has her vaccine card. She will send a copy to us to review.     Review of Systems   Constitutional, HEENT, cardiovascular, pulmonary, gi and gu systems are negative, except as otherwise noted.      Objective           Vitals:  No vitals were obtained today due to virtual visit.    Physical Exam   healthy, alert and no distress  PSYCH: Alert and oriented times 3; coherent speech, normal   rate and volume, able to articulate logical thoughts, able   to abstract reason, no tangential thoughts, no hallucinations   or delusions  Her affect is normal  RESP: No cough, no audible wheezing, able to talk in full sentences  Remainder of exam unable to be completed due to telephone visits                Phone call duration: 7 minutes

## 2021-06-03 DIAGNOSIS — M12.532 TRAUMATIC ARTHRITIS OF LEFT WRIST: ICD-10-CM

## 2021-06-04 RX ORDER — TRAMADOL HYDROCHLORIDE 50 MG/1
50 TABLET ORAL EVERY 6 HOURS PRN
Qty: 120 TABLET | Refills: 0 | Status: SHIPPED | OUTPATIENT
Start: 2021-06-04 | End: 2021-07-08

## 2021-06-22 DIAGNOSIS — J30.89 CHRONIC NON-SEASONAL ALLERGIC RHINITIS: ICD-10-CM

## 2021-06-23 RX ORDER — PSEUDOEPHEDRINE HCL 120 MG/1
TABLET, FILM COATED, EXTENDED RELEASE ORAL
Qty: 60 TABLET | Refills: 5 | Status: SHIPPED | OUTPATIENT
Start: 2021-06-23 | End: 2022-01-04

## 2021-06-23 NOTE — TELEPHONE ENCOUNTER
Routing refill request to provider for review/approval because:  Drug not on the [facility] refill protocol           Pending Prescriptions:                       Disp   Refills    pseudoePHEDrine (EQL 12 HOUR DECONGESTANT)*60 tab*5        Sig: TAKE ONE TABLET BY MOUTH EVERY TWELVE HOURS        Heriberto Mullins RN

## 2021-07-06 DIAGNOSIS — M12.532 TRAUMATIC ARTHRITIS OF LEFT WRIST: ICD-10-CM

## 2021-07-06 DIAGNOSIS — F33.1 MAJOR DEPRESSIVE DISORDER, RECURRENT EPISODE, MODERATE (H): ICD-10-CM

## 2021-07-08 RX ORDER — ARIPIPRAZOLE 5 MG/1
TABLET ORAL
Qty: 90 TABLET | Refills: 1 | Status: SHIPPED | OUTPATIENT
Start: 2021-07-08 | End: 2022-01-06

## 2021-07-08 RX ORDER — TRAMADOL HYDROCHLORIDE 50 MG/1
50 TABLET ORAL EVERY 6 HOURS PRN
Qty: 120 TABLET | Refills: 0 | Status: SHIPPED | OUTPATIENT
Start: 2021-07-08 | End: 2021-08-09

## 2021-07-09 NOTE — TELEPHONE ENCOUNTER
Caller: Feliz Rueda     Relationship: Self    Best call back number: [phone]    Medication needed:   Requested Prescriptions     Pending Prescriptions Disp Refills   • Chlorcyclizine-Pseudoephed (Stahist AD) 25-60 MG tablet 42 tablet 2     Sig: One tablet bid       When do you need the refill by: 07/09/21    What additional details did the patient provide when requesting the medication: PATIENT SAID THIS WAS SENT INTO [facility] TODAY, AND HE IS NOT USING [facility] FOR THIS DUE TO IT NOT BEING COVERED    THE PATIENT SAID HE WOULD LIKE IT SENT TO THE [facility]    HE IS AWARE THEY ARE CLOSING TODAY AT 4 P.M. AND IS OK WITH THAT    Does the patient have less than a 3 day supply:  [x] Yes  [] No    What is the patient's preferred pharmacy: [id number] - [place], [place] - [address] - [phone]  - [phone] FX                Routing refill request to provider for review/approval because:  Drug not on the [facility] refill protocol

## 2021-08-01 ENCOUNTER — HEALTH MAINTENANCE LETTER (OUTPATIENT)
Age: 61
End: 2021-08-01

## 2021-08-09 ENCOUNTER — VIRTUAL VISIT (OUTPATIENT)
Dept: FAMILY MEDICINE | Facility: CLINIC | Age: 61
End: 2021-08-09
Payer: COMMERCIAL

## 2021-08-09 DIAGNOSIS — M12.532 TRAUMATIC ARTHRITIS OF LEFT WRIST: ICD-10-CM

## 2021-08-09 DIAGNOSIS — M19.132 POST-TRAUMATIC OSTEOARTHRITIS OF LEFT WRIST: ICD-10-CM

## 2021-08-09 DIAGNOSIS — F33.1 MAJOR DEPRESSIVE DISORDER, RECURRENT EPISODE, MODERATE (H): ICD-10-CM

## 2021-08-09 DIAGNOSIS — B35.1 ONYCHOMYCOSIS: ICD-10-CM

## 2021-08-09 DIAGNOSIS — G89.29 CHRONIC PAIN OF LEFT WRIST: ICD-10-CM

## 2021-08-09 DIAGNOSIS — M25.532 CHRONIC PAIN OF LEFT WRIST: ICD-10-CM

## 2021-08-09 PROCEDURE — 99214 OFFICE O/P EST MOD 30 MIN: CPT | Mod: 95 | Performed by: PHYSICIAN ASSISTANT

## 2021-08-09 RX ORDER — GABAPENTIN 300 MG/1
300 CAPSULE ORAL AT BEDTIME
Qty: 90 CAPSULE | Refills: 1 | Status: SHIPPED | OUTPATIENT
Start: 2021-08-09 | End: 2022-01-28

## 2021-08-09 RX ORDER — TERBINAFINE HYDROCHLORIDE 250 MG/1
250 TABLET ORAL DAILY
Qty: 30 TABLET | Refills: 2 | Status: CANCELLED | OUTPATIENT
Start: 2021-08-09

## 2021-08-09 RX ORDER — FLUOXETINE 40 MG/1
40 CAPSULE ORAL DAILY
Qty: 90 CAPSULE | Refills: 1 | Status: SHIPPED | OUTPATIENT
Start: 2021-08-09 | End: 2022-01-28

## 2021-08-09 RX ORDER — TRAMADOL HYDROCHLORIDE 50 MG/1
50 TABLET ORAL EVERY 6 HOURS PRN
Qty: 120 TABLET | Refills: 0 | Status: SHIPPED | OUTPATIENT
Start: 2021-08-09 | End: 2021-09-14

## 2021-08-09 NOTE — PROGRESS NOTES
Nataliia is a 60 year old who is being evaluated via a billable telephone visit.      What phone number would you like to be contacted at? [phone]   How would you like to obtain your AVS? MyChart    Assessment & Plan     Onychomycosis  Discussed refills should not be needed for lamisil.     Major depressive disorder, recurrent episode, moderate (H)  Stable. Refilled.   - FLUoxetine (PROZAC) 40 MG capsule; Take 1 capsule (40 mg) by mouth daily    Chronic pain of left wrist  Stable refilled.   - gabapentin (NEURONTIN) 300 MG capsule; Take 1 capsule (300 mg) by mouth At Bedtime    Post-traumatic osteoarthritis of left wrist  Stable.   - gabapentin (NEURONTIN) 300 MG capsule; Take 1 capsule (300 mg) by mouth At Bedtime    Traumatic arthritis of left wrist  Stable,  checked, patient has previously seen pain clinic. Follow up in 3 months for in person appointment.   - traMADol (ULTRAM) 50 MG tablet; Take 1 tablet (50 mg) by mouth every 6 hours as needed for severe pain                 Return in about 3 months (around 11/9/2021) for Pain Check.    Meg Bernardo PA-C  [facility]    Martin William is a 60 year old who presents for the following health issues     HPI     Chronic Pain Follow-Up    Where in your body do you have pain? Left wrist  How has your pain affected your ability to work? Full Time  Which of these pain treatments have you tried since your last clinic visit? Pain Clinic and Other: Voltren  How well are you sleeping? Good  How has your mood been since your last visit? Better  Have you had a significant life event? Grief or Loss  Other aggravating factors: usage  Taking medication as directed? Yes    PHQ-9 SCORE 12/10/2019 9/21/2020 5/4/2021   PHQ-9 Total Score - - -   PHQ-9 Total Score 8 8 3     CAROLYN-7 SCORE 8/3/2016   Total Score 17     No flowsheet data found.  Encounter-Level CSA:    There are no encounter-level csa.     Patient-Level CSA:    Controlled Substance  Agreement - Opioid - Scan on 1/12/2021  7:50 AM  Controlled Substance Agreement - Opioid - Scan on 7/15/2019  1:42 PM         How many servings of fruits and vegetables do you eat daily?  0-1    On average, how many sweetened beverages do you drink each day (Examples: soda, juice, sweet tea, etc.  Do NOT count diet or artificially sweetened beverages)?   3    How many days per week do you exercise enough to make your heart beat faster? 3 or less    How many minutes a day do you exercise enough to make your heart beat faster? 9 or less    How many days per week do you miss taking your medication? 0  If she is working then the pain flares.   It is the same no changes.   Using the voltaren gel and tramadol. Tramadol 1 QID.   Pain does not wake her from sleep.   No new numbness/tingling or weakness in the hand.         Review of Systems   Constitutional, HEENT, cardiovascular, pulmonary, gi and gu systems are negative, except as otherwise noted.      Objective           Vitals:  No vitals were obtained today due to virtual visit.    Physical Exam   healthy, alert and no distress  PSYCH: Alert and oriented times 3; coherent speech, normal   rate and volume, able to articulate logical thoughts, able   to abstract reason, no tangential thoughts, no hallucinations   or delusions  Her affect is normal  RESP: No cough, no audible wheezing, able to talk in full sentences  Remainder of exam unable to be completed due to telephone visits                Phone call duration: 5 minutes

## 2021-09-14 DIAGNOSIS — M12.532 TRAUMATIC ARTHRITIS OF LEFT WRIST: ICD-10-CM

## 2021-09-14 RX ORDER — TRAMADOL HYDROCHLORIDE 50 MG/1
50 TABLET ORAL EVERY 6 HOURS PRN
Qty: 120 TABLET | Refills: 0 | Status: SHIPPED | OUTPATIENT
Start: 2021-09-14 | End: 2021-11-01

## 2021-09-26 ENCOUNTER — HEALTH MAINTENANCE LETTER (OUTPATIENT)
Age: 61
End: 2021-09-26

## 2021-11-01 DIAGNOSIS — M12.532 TRAUMATIC ARTHRITIS OF LEFT WRIST: ICD-10-CM

## 2021-11-01 RX ORDER — TRAMADOL HYDROCHLORIDE 50 MG/1
50 TABLET ORAL EVERY 6 HOURS PRN
Qty: 120 TABLET | Refills: 0 | Status: SHIPPED | OUTPATIENT
Start: 2021-11-01 | End: 2021-12-06

## 2021-11-01 NOTE — TELEPHONE ENCOUNTER
Routing refill request to provider for review/approval because:  Drug not on the [facility] refill protocol           Pending Prescriptions:                       Disp   Refills    traMADol (ULTRAM) 50 MG tablet [Pharmacy M*120 ta*0        Sig: TAKE 1 TABLET (50 MG) BY MOUTH EVERY 6 HOURS AS           NEEDED FOR SEVERE PAIN        Kit SHERRELL Mullins

## 2021-12-04 DIAGNOSIS — M12.532 TRAUMATIC ARTHRITIS OF LEFT WRIST: ICD-10-CM

## 2021-12-04 NOTE — TELEPHONE ENCOUNTER
Routing refill request to provider for review/approval because:  Drug not on the [facility] refill protocol   traMADol (ULTRAM) 50 MG tablet 120 tablet 0 11/1/2021  No   Sig - Route: TAKE 1 TABLET (50 MG) BY MOUTH EVERY 6 HOURS AS NEEDED FOR SEVERE PAIN - Oral

## 2021-12-06 RX ORDER — TRAMADOL HYDROCHLORIDE 50 MG/1
50 TABLET ORAL EVERY 6 HOURS PRN
Qty: 120 TABLET | Refills: 0 | Status: SHIPPED | OUTPATIENT
Start: 2021-12-06 | End: 2022-01-19

## 2021-12-06 NOTE — TELEPHONE ENCOUNTER
Refilled. Pleas inform patient she will need an in person appointment prior to another refill.   Meg Bernardo PA-C

## 2021-12-06 NOTE — TELEPHONE ENCOUNTER
Called and let pt know about message below. Pt will call back to schedule an appointment.    Kerri CHUNG MA

## 2022-01-04 ENCOUNTER — TELEPHONE (OUTPATIENT)
Dept: FAMILY MEDICINE | Facility: CLINIC | Age: 62
End: 2022-01-04
Payer: COMMERCIAL

## 2022-01-04 DIAGNOSIS — M12.532 TRAUMATIC ARTHRITIS OF LEFT WRIST: ICD-10-CM

## 2022-01-04 DIAGNOSIS — J30.89 CHRONIC NON-SEASONAL ALLERGIC RHINITIS: ICD-10-CM

## 2022-01-04 DIAGNOSIS — F33.1 MAJOR DEPRESSIVE DISORDER, RECURRENT EPISODE, MODERATE (H): ICD-10-CM

## 2022-01-04 RX ORDER — PSEUDOEPHEDRINE HCL 120 MG/1
TABLET, FILM COATED, EXTENDED RELEASE ORAL
Qty: 60 TABLET | Refills: 0 | Status: SHIPPED | OUTPATIENT
Start: 2022-01-04 | End: 2022-02-03

## 2022-01-04 NOTE — TELEPHONE ENCOUNTER
Routing refill request to provider for review/approval because:  Drug not on the [facility] refill protocol       Parisa Liang RN  [facility]

## 2022-01-06 RX ORDER — ARIPIPRAZOLE 5 MG/1
TABLET ORAL
Qty: 90 TABLET | Refills: 1 | Status: SHIPPED | OUTPATIENT
Start: 2022-01-06 | End: 2022-07-19

## 2022-01-06 RX ORDER — TRAMADOL HYDROCHLORIDE 50 MG/1
50 TABLET ORAL EVERY 6 HOURS PRN
Qty: 120 TABLET | Refills: 0 | OUTPATIENT
Start: 2022-01-06

## 2022-01-06 NOTE — TELEPHONE ENCOUNTER
Patient was informed on 12/4/21 that she would need an in person appointment prior to another fill of her tramadol. If she schedules I will fill until appointment date only.   Meg Bernardo PA-C

## 2022-01-12 NOTE — TELEPHONE ENCOUNTER
Tried to call pt but no answer x2. Sent pt [employer] message informing her she is overdue for office appointment for tramadol refill. Will route back once patient has scheduled.    Sussy Cheng MA on 1/12/2022 at 7:21 AM

## 2022-01-19 ENCOUNTER — TELEPHONE (OUTPATIENT)
Dept: FAMILY MEDICINE | Facility: CLINIC | Age: 62
End: 2022-01-19
Payer: COMMERCIAL

## 2022-01-19 DIAGNOSIS — M12.532 TRAUMATIC ARTHRITIS OF LEFT WRIST: ICD-10-CM

## 2022-01-19 RX ORDER — TRAMADOL HYDROCHLORIDE 50 MG/1
50 TABLET ORAL EVERY 6 HOURS PRN
Qty: 48 TABLET | Refills: 0 | Status: SHIPPED | OUTPATIENT
Start: 2022-01-19 | End: 2022-01-28

## 2022-01-19 NOTE — TELEPHONE ENCOUNTER
Routing refill request to provider for review/approval because:  Drug not on the [facility] refill protocol     Pt made follow up appointment for 1/28/22, the earliest she could get in.    Esthela CHUN RN, BSN  [facility]

## 2022-01-19 NOTE — TELEPHONE ENCOUNTER
Patient needs to be seen in clinic. This was previously noted that it needed to be an in person appointment- see 1/4/22 message. I will fill medications until appointment only. Please notify patient she must be seen in person.   Meg Bernardo PA-C

## 2022-01-28 ENCOUNTER — VIRTUAL VISIT (OUTPATIENT)
Dept: FAMILY MEDICINE | Facility: CLINIC | Age: 62
End: 2022-01-28
Payer: COMMERCIAL

## 2022-01-28 DIAGNOSIS — F33.1 MAJOR DEPRESSIVE DISORDER, RECURRENT EPISODE, MODERATE (H): ICD-10-CM

## 2022-01-28 DIAGNOSIS — M19.132 POST-TRAUMATIC OSTEOARTHRITIS OF LEFT WRIST: ICD-10-CM

## 2022-01-28 DIAGNOSIS — M12.532 TRAUMATIC ARTHRITIS OF LEFT WRIST: ICD-10-CM

## 2022-01-28 DIAGNOSIS — G89.29 CHRONIC PAIN OF LEFT WRIST: ICD-10-CM

## 2022-01-28 DIAGNOSIS — G47.00 PERSISTENT INSOMNIA: ICD-10-CM

## 2022-01-28 DIAGNOSIS — M25.532 CHRONIC PAIN OF LEFT WRIST: ICD-10-CM

## 2022-01-28 PROCEDURE — 99214 OFFICE O/P EST MOD 30 MIN: CPT | Mod: 95 | Performed by: PHYSICIAN ASSISTANT

## 2022-01-28 PROCEDURE — 96127 BRIEF EMOTIONAL/BEHAV ASSMT: CPT | Mod: 59 | Performed by: PHYSICIAN ASSISTANT

## 2022-01-28 RX ORDER — GABAPENTIN 300 MG/1
300 CAPSULE ORAL 2 TIMES DAILY
Qty: 180 CAPSULE | Refills: 1 | Status: SHIPPED | OUTPATIENT
Start: 2022-01-28 | End: 2022-03-01

## 2022-01-28 RX ORDER — TRAMADOL HYDROCHLORIDE 50 MG/1
50 TABLET ORAL EVERY 6 HOURS PRN
Qty: 120 TABLET | Refills: 0 | Status: SHIPPED | OUTPATIENT
Start: 2022-01-28 | End: 2022-03-01

## 2022-01-28 RX ORDER — FLUOXETINE 40 MG/1
40 CAPSULE ORAL DAILY
Qty: 90 CAPSULE | Refills: 1 | Status: SHIPPED | OUTPATIENT
Start: 2022-01-28 | End: 2022-08-29

## 2022-01-28 RX ORDER — TRAZODONE HYDROCHLORIDE 100 MG/1
TABLET ORAL
Qty: 90 TABLET | Refills: 1 | Status: SHIPPED | OUTPATIENT
Start: 2022-01-28 | End: 2022-08-08

## 2022-01-28 ASSESSMENT — PAIN SCALES - GENERAL: PAINLEVEL: MILD PAIN (3)

## 2022-01-28 NOTE — PROGRESS NOTES
Nataliia is a 61 year old who is being evaluated via a billable telephone visit.      What phone number would you like to be contacted at? [phone]  How would you like to obtain your AVS? Kevin    Assessment & Plan     1. Major depressive disorder, recurrent episode, moderate (H)    2. Persistent insomnia    3. Chronic pain of left wrist    4. Post-traumatic osteoarthritis of left wrist    5. Traumatic arthritis of left wrist      Refilled meds. Declined counseling.   Advised must be seen in clinic within 1 month in order to continue to refill pain medications.                      Return in about 4 weeks (around 2/25/2022) for Pain Check.    Meg Bernardo PA-C  [facility] is [facility] 61 [facility] who presents for the following health issues     HPI     Depression Followup    How are you doing with your depression since your last visit? Worsened     Are you having other symptoms that might be associated with depression? Yes:  passing of father    Have you had a significant life event?  Grief or Loss     Are you feeling anxious or having panic attacks?   Yes:  with loss of father    Do you have any concerns with your use of alcohol or other drugs? No    Social History     Tobacco Use     Smoking status: Never Smoker     Smokeless tobacco: Never Used   Substance Use Topics     Alcohol use: No     Drug use: No     PHQ 9/21/2020 5/4/2021 1/28/2022   PHQ-9 Total Score 8 3 9   Q9: Thoughts of better off dead/self-harm past 2 weeks Not at all Not at all Not at all     CAROLYN-7 SCORE 8/3/2016 1/28/2022   Total Score - 13 (moderate anxiety)   Total Score 17 13     Last PHQ-9 1/28/2022   1.  Little interest or pleasure in doing things 1   2.  Feeling down, depressed, or hopeless 1   3.  Trouble falling or staying asleep, or sleeping too much 1   4.  Feeling tired or having little energy 1   5.  Poor appetite or overeating 1   6.  Feeling bad about yourself 1   7.  Trouble concentrating  2   8.  Moving slowly or restless 1   Q9: Thoughts of better off dead/self-harm past 2 weeks 0   PHQ-9 Total Score 9   Difficulty at work, home, or with people -     CAROLYN-7  1/28/2022   1. Feeling nervous, anxious, or on edge 2   2. Not being able to stop or control worrying 2   3. Worrying too much about different things 2   4. Trouble relaxing 2   5. Being so restless that it is hard to sit still 2   6. Becoming easily annoyed or irritable 2   7. Feeling afraid, as if something awful might happen 1   CAROLYN-7 Total Score 13   If you checked any problems, how difficult have they made it for you to do your work, take care of things at home, or get along with other people? -       Suicide Assessment Five-step Evaluation and Treatment (SAFE-T)    Pain History:  When did you first notice your pain? - More than 6 weeks   Have you seen this provider for your pain in the past?   Yes   Where in your body do you have pain? Both Knees and Both Wrists  Are you seeing anyone else for your pain? No    PHQ-9 SCORE 9/21/2020 5/4/2021 1/28/2022   PHQ-9 Total Score - - -   PHQ-9 Total Score MyChart - - 9 (Mild depression)   PHQ-9 Total Score 8 3 9                 Chronic Pain Follow Up:    Location of pain: wrist- worsening  Analgesia/pain control:    - Recent changes:  Working overtime.     - Overall control: Tolerable with discomfort    - Current treatments: Voltaren and Tramadol.    Adherence:     - Do you ever take more pain medicine than prescribed? No    - When did you take your last dose of pain medicine?  Yesterday.    Adverse effects: No   PDMP Review       Value Time User    State PDMP site checked  Yes 12/6/2021 10:10 AM Meg Bernardo PA-C        Last CSA Agreement:   CSA -- Patient Level:    Controlled Substance Agreement - Opioid - Scan on 1/12/2021  7:50 AM  Controlled Substance Agreement - Opioid - Scan on 7/15/2019  1:42 PM       Last UDS: 1/15/2021             Patient had been told she needed an in person appointment  for this appointment. Due to her fathers death she didn't feel that she cold come in until next month.     Death is affecting her. Declines counseling at this time. Has sisters she can talk with.   Taking her meds. Uses the trazodone nightly,     Has been having sharp pains in her wrist. Has been working a lot of overtime and that does make her pain worse. Working 2-3 hours of overtime per day.   No new weakness. Taking the meds 1 tab 4 times per day. Does not use less than this.   Wondering if she can increase her gabapentin dose     Review of Systems   Constitutional, HEENT, cardiovascular, pulmonary, gi and gu systems are negative, except as otherwise noted.      Objective    Vitals - Patient Reported  Pain Score: Mild Pain (3)  Pain Loc: Other - see comment (knees and wrists)      Vitals:  No vitals were obtained today due to virtual visit.    Physical Exam   healthy, alert and no distress  PSYCH: Alert and oriented times 3; coherent speech, normal   rate and volume, able to articulate logical thoughts, able   to abstract reason, no tangential thoughts, no hallucinations   or delusions  Her affect is normal  RESP: No cough, no audible wheezing, able to talk in full sentences  Remainder of exam unable to be completed due to telephone visits                Phone call duration: 8 minutes

## 2022-02-03 DIAGNOSIS — J30.89 CHRONIC NON-SEASONAL ALLERGIC RHINITIS: ICD-10-CM

## 2022-02-03 RX ORDER — PSEUDOEPHEDRINE HCL 120 MG/1
TABLET, FILM COATED, EXTENDED RELEASE ORAL
Qty: 60 TABLET | Refills: 0 | Status: SHIPPED | OUTPATIENT
Start: 2022-02-03 | End: 2022-03-01

## 2022-03-01 ENCOUNTER — OFFICE VISIT (OUTPATIENT)
Dept: FAMILY MEDICINE | Facility: CLINIC | Age: 62
End: 2022-03-01
Payer: COMMERCIAL

## 2022-03-01 VITALS
HEIGHT: 63 IN | HEART RATE: 95 BPM | WEIGHT: 111.2 LBS | TEMPERATURE: 97.9 F | SYSTOLIC BLOOD PRESSURE: 127 MMHG | OXYGEN SATURATION: 98 % | BODY MASS INDEX: 19.7 KG/M2 | DIASTOLIC BLOOD PRESSURE: 63 MMHG

## 2022-03-01 DIAGNOSIS — K25.9 GASTRIC ULCER WITHOUT HEMORRHAGE OR PERFORATION, UNSPECIFIED CHRONICITY: ICD-10-CM

## 2022-03-01 DIAGNOSIS — Z12.31 VISIT FOR SCREENING MAMMOGRAM: ICD-10-CM

## 2022-03-01 DIAGNOSIS — M25.532 CHRONIC PAIN OF LEFT WRIST: ICD-10-CM

## 2022-03-01 DIAGNOSIS — G89.29 CHRONIC PAIN OF LEFT WRIST: ICD-10-CM

## 2022-03-01 DIAGNOSIS — J30.89 CHRONIC NON-SEASONAL ALLERGIC RHINITIS: ICD-10-CM

## 2022-03-01 DIAGNOSIS — Z79.899 ENCOUNTER FOR LONG-TERM (CURRENT) USE OF MEDICATIONS: ICD-10-CM

## 2022-03-01 DIAGNOSIS — M12.532 TRAUMATIC ARTHRITIS OF LEFT WRIST: ICD-10-CM

## 2022-03-01 DIAGNOSIS — M19.132 POST-TRAUMATIC OSTEOARTHRITIS OF LEFT WRIST: Primary | ICD-10-CM

## 2022-03-01 LAB
ANION GAP SERPL CALCULATED.3IONS-SCNC: 3 MMOL/L (ref 3–14)
BASOPHILS # BLD AUTO: 0 10E3/UL (ref 0–0.2)
BASOPHILS NFR BLD AUTO: 1 %
BUN SERPL-MCNC: 14 MG/DL (ref 7–30)
CALCIUM SERPL-MCNC: 8.9 MG/DL (ref 8.5–10.1)
CANNABINOIDS UR QL SCN: NORMAL
CHLORIDE BLD-SCNC: 104 MMOL/L (ref 94–109)
CO2 SERPL-SCNC: 30 MMOL/L (ref 20–32)
CREAT SERPL-MCNC: 0.84 MG/DL (ref 0.52–1.04)
EOSINOPHIL # BLD AUTO: 0.1 10E3/UL (ref 0–0.7)
EOSINOPHIL NFR BLD AUTO: 2 %
ERYTHROCYTE [DISTWIDTH] IN BLOOD BY AUTOMATED COUNT: 13 % (ref 10–15)
GFR SERPL CREATININE-BSD FRML MDRD: 79 ML/MIN/1.73M2
GLUCOSE BLD-MCNC: 80 MG/DL (ref 70–99)
HCT VFR BLD AUTO: 36.8 % (ref 35–47)
HGB BLD-MCNC: 12.1 G/DL (ref 11.7–15.7)
LYMPHOCYTES # BLD AUTO: 1.7 10E3/UL (ref 0.8–5.3)
LYMPHOCYTES NFR BLD AUTO: 32 %
MCH RBC QN AUTO: 27.9 PG (ref 26.5–33)
MCHC RBC AUTO-ENTMCNC: 32.9 G/DL (ref 31.5–36.5)
MCV RBC AUTO: 85 FL (ref 78–100)
MONOCYTES # BLD AUTO: 0.5 10E3/UL (ref 0–1.3)
MONOCYTES NFR BLD AUTO: 10 %
NEUTROPHILS # BLD AUTO: 3.1 10E3/UL (ref 1.6–8.3)
NEUTROPHILS NFR BLD AUTO: 56 %
PLATELET # BLD AUTO: 281 10E3/UL (ref 150–450)
POTASSIUM BLD-SCNC: 4.7 MMOL/L (ref 3.4–5.3)
RBC # BLD AUTO: 4.33 10E6/UL (ref 3.8–5.2)
SODIUM SERPL-SCNC: 137 MMOL/L (ref 133–144)
WBC # BLD AUTO: 5.5 10E3/UL (ref 4–11)

## 2022-03-01 PROCEDURE — 99214 OFFICE O/P EST MOD 30 MIN: CPT | Mod: 25 | Performed by: PHYSICIAN ASSISTANT

## 2022-03-01 PROCEDURE — 80307 DRUG TEST PRSMV CHEM ANLYZR: CPT | Performed by: PHYSICIAN ASSISTANT

## 2022-03-01 PROCEDURE — 90471 IMMUNIZATION ADMIN: CPT | Performed by: PHYSICIAN ASSISTANT

## 2022-03-01 PROCEDURE — 85025 COMPLETE CBC W/AUTO DIFF WBC: CPT | Performed by: PHYSICIAN ASSISTANT

## 2022-03-01 PROCEDURE — 36415 COLL VENOUS BLD VENIPUNCTURE: CPT | Performed by: PHYSICIAN ASSISTANT

## 2022-03-01 PROCEDURE — 80048 BASIC METABOLIC PNL TOTAL CA: CPT | Performed by: PHYSICIAN ASSISTANT

## 2022-03-01 PROCEDURE — 90750 HZV VACC RECOMBINANT IM: CPT | Performed by: PHYSICIAN ASSISTANT

## 2022-03-01 PROCEDURE — 96127 BRIEF EMOTIONAL/BEHAV ASSMT: CPT | Performed by: PHYSICIAN ASSISTANT

## 2022-03-01 RX ORDER — GABAPENTIN 300 MG/1
300 CAPSULE ORAL 3 TIMES DAILY
Qty: 270 CAPSULE | Refills: 1 | Status: SHIPPED | OUTPATIENT
Start: 2022-03-01 | End: 2023-01-31

## 2022-03-01 RX ORDER — PSEUDOEPHEDRINE HCL 120 MG/1
120 TABLET, FILM COATED, EXTENDED RELEASE ORAL EVERY 12 HOURS
Qty: 60 TABLET | Refills: 5 | Status: SHIPPED | OUTPATIENT
Start: 2022-03-01 | End: 2022-03-08

## 2022-03-01 RX ORDER — FAMOTIDINE 40 MG/1
TABLET, FILM COATED ORAL
Qty: 90 TABLET | Refills: 3 | Status: SHIPPED | OUTPATIENT
Start: 2022-03-01 | End: 2023-01-31

## 2022-03-01 RX ORDER — TRAMADOL HYDROCHLORIDE 50 MG/1
50 TABLET ORAL EVERY 6 HOURS PRN
Qty: 120 TABLET | Refills: 0 | Status: SHIPPED | OUTPATIENT
Start: 2022-03-01 | End: 2022-04-07

## 2022-03-01 ASSESSMENT — ANXIETY QUESTIONNAIRES
7. FEELING AFRAID AS IF SOMETHING AWFUL MIGHT HAPPEN: MORE THAN HALF THE DAYS
6. BECOMING EASILY ANNOYED OR IRRITABLE: MORE THAN HALF THE DAYS
IF YOU CHECKED OFF ANY PROBLEMS ON THIS QUESTIONNAIRE, HOW DIFFICULT HAVE THESE PROBLEMS MADE IT FOR YOU TO DO YOUR WORK, TAKE CARE OF THINGS AT HOME, OR GET ALONG WITH OTHER PEOPLE: SOMEWHAT DIFFICULT
5. BEING SO RESTLESS THAT IT IS HARD TO SIT STILL: MORE THAN HALF THE DAYS
2. NOT BEING ABLE TO STOP OR CONTROL WORRYING: MORE THAN HALF THE DAYS
3. WORRYING TOO MUCH ABOUT DIFFERENT THINGS: MORE THAN HALF THE DAYS
1. FEELING NERVOUS, ANXIOUS, OR ON EDGE: MORE THAN HALF THE DAYS
GAD7 TOTAL SCORE: 14

## 2022-03-01 ASSESSMENT — PATIENT HEALTH QUESTIONNAIRE - PHQ9
5. POOR APPETITE OR OVEREATING: MORE THAN HALF THE DAYS
SUM OF ALL RESPONSES TO PHQ QUESTIONS 1-9: 11

## 2022-03-01 NOTE — NURSING NOTE
Prior to immunization administration, verified patients identity using patient s name and date of birth. Please see Immunization Activity for additional information.     Screening Questionnaire for Adult Immunization    Are you sick today?   No   Do you have allergies to medications, food, a vaccine component or latex?   No   Have you ever had a serious reaction after receiving a vaccination?   No   Do you have a long-term health problem with heart, lung, kidney, or metabolic disease (e.g., diabetes), asthma, a blood disorder, no spleen, complement component deficiency, a cochlear implant, or a spinal fluid leak?  Are you on long-term aspirin therapy?   No   Do you have cancer, leukemia, HIV/AIDS, or any other immune system problem?   No   Do you have a parent, brother, or sister with an immune system problem?   No   In the past 3 months, have you taken medications that affect  your immune system, such as prednisone, other steroids, or anticancer drugs; drugs for the treatment of rheumatoid arthritis, Crohn s disease, or psoriasis; or have you had radiation treatments?   No   Have you had a seizure, or a brain or other nervous system problem?   No   During the past year, have you received a transfusion of blood or blood    products, or been given immune (gamma) globulin or antiviral drug?   No   For women: Are you pregnant or is there a chance you could become       pregnant during the next month?   No   Have you received any vaccinations in the past 4 weeks?   No     Immunization questionnaire answers were all negative.      Screening performed by Kerri Wagner CMA on 3/1/2022 at 11:55 AM.      Due to injection administration, patient instructed to remain in clinic for 15 minutes  afterwards, and to report any adverse reaction to me immediately.  VIS for Shingrix given on same date of administration.  Staff signature/Title: Kerri CHUNG MA     Verified patients Name and .      Kerri CHUNG MA    Brought pts Pain  Contract to the .    Kerri CHUNG MA

## 2022-03-01 NOTE — PATIENT INSTRUCTIONS
We care about your health and have noticed that you are due for a mammogram. Please schedule this at your earliest convenience at the location of your choice.     Call [phone] and request a Mammogram appointment.      [place] [facility] are performed Monday- Friday at our [facility], [facility], or our [facility].

## 2022-03-01 NOTE — LETTER
Opioid / Opioid Plus Controlled Substance Agreement    This is an agreement between you and your provider about the safe and appropriate use of controlled substance/opioids prescribed by your care team. Controlled substances are medicines that can cause physical and mental dependence (abuse).    There are strict laws about having and using these medicines. We here at [facility] are committing to working with you in your efforts to get better. To support you in this work, we ll help you schedule regular office appointments for medicine refills. If we must cancel or change your appointment for any reason, we ll make sure you have enough medicine to last until your next appointment.     As a Provider, I will:    Listen carefully to your concerns and treat you with respect.     Recommend a treatment plan that I believe is in your best interest. This plan may involve therapies other than opioid pain medication.     Talk with you often about the possible benefits, and the risk of harm of any medicine that we prescribe for you.     Provide a plan on how to taper (discontinue or go off) using this medicine if the decision is made to stop its use.    As a Patient, I understand that opioid(s):     Are a controlled substance prescribed by my care team to help me function or work and manage my condition(s).     Are strong medicines and can cause serious side effects such as:    Drowsiness, which can seriously affect my driving ability    A lower breathing rate, enough to cause death    Harm to my thinking ability     Depression     Abuse of and addiction to this medicine    Need to be taken exactly as prescribed. Combining opioids with certain medicines or chemicals (such as illegal drugs, sedatives, sleeping pills, and benzodiazepines) can be dangerous or even fatal. If I stop opioids suddenly, I may have severe withdrawal symptoms.    Do not work for all types of pain nor for all patients. If they re not helpful, I may  be asked to stop them.        The risks, benefits and side effects of these medicine(s) were explained to me. I agree that:  1. I will take part in other treatments as advised by my care team. This may be psychiatry or counseling, physical therapy, behavioral therapy, group treatment or a referral to a specialist.     2. I will keep all my appointments. I understand that this is part of the monitoring of opioids. My care team may require an office visit for EVERY opioid/controlled substance refill. If I miss appointments or don t follow instructions, my care team may stop my medicine.    3. I will take my medicines as prescribed. I will not change the dose or schedule unless my care team tells me to. There will be no refills if I run out early.     4. I may be asked to come to the clinic and complete a urine drug test or complete a pill count at any time. If I don t give a urine sample or participate in a pill count, the care team may stop my medicine.    5. I will only receive prescriptions from this clinic for chronic pain. If I am treated by another provider for acute pain issues, I will tell them that I am taking opioid pain medication for chronic pain and that I have a treatment agreement with this provider. I will inform my [facility] care team within one business day if I am given a prescription for any pain medication by another healthcare provider. My [facility] care team can contact other providers and pharmacists about my use of any medicines.    6. It is up to me to make sure that I don t run out of my medicines on weekends or holidays. If my care team is willing to refill my opioid prescription without a visit, I must request refills only during office hours. Refills may take up to 3 business days to process. I will use one pharmacy to fill all my opioid and other controlled substance prescriptions. I will notify the clinic about any changes to my insurance or medication  availability.    7. I am responsible for my prescriptions. If the medicine/prescription is lost, stolen or destroyed, it will not be replaced. I also agree not to share controlled substance medicines with anyone.    8. I am aware I should not use any illegal or recreational drugs. I agree not to drink alcohol unless my care team says I can.       9. If I enroll in the [place] Medical Cannabis program, I will tell my care team prior to my next refill.     10. I will tell my care team right away if I become pregnant, have a new medical problem treated outside of my regular clinic, or have a change in my medications.    11. I understand that this medicine can affect my thinking, judgment and reaction time. Alcohol and drugs affect the brain and body, which can affect the safety of my driving. Being under the influence of alcohol or drugs can affect my decision-making, behaviors, personal safety, and the safety of others. Driving while impaired (DWI) can occur if a person is driving, operating, or in physical control of a car, motorcycle, boat, snowmobile, ATV, motorbike, off-road vehicle, or any other motor vehicle ([place] Statute 169A.20). I understand the risk if I choose to drive or operate any vehicle or machinery.    I understand that if I do not follow any of the conditions above, my prescriptions or treatment may be stopped or changed.          Opioids  What You Need to Know    What are opioids?   Opioids are pain medicines that must be prescribed by a doctor. They are also known as narcotics.     Examples are:   1. morphine (MS Contin, Ainsley)  2. oxycodone (Oxycontin)  3. oxycodone and acetaminophen (Percocet)  4. hydrocodone and acetaminophen (Vicodin, Norco)   5. fentanyl patch (Duragesic)   6. hydromorphone (Dilaudid)   7. methadone  8. codeine (Tylenol #3)     What do opioids do well?   Opioids are best for severe short-term pain such as after a surgery or injury. They may work well for cancer pain. They may  help some people with long-lasting (chronic) pain.     What do opioids NOT do well?   Opioids never get rid of pain entirely, and they don t work well for most patients with chronic pain. Opioids don t reduce swelling, one of the causes of pain.                                    Other ways to manage chronic pain and improve function include:       Treat the health problem that may be causing pain    Anti-inflammation medicines, which reduce swelling and tenderness, such as ibuprofen (Advil, Motrin) or naproxen (Aleve)    Acetaminophen (Tylenol)    Antidepressants and anti-seizure medicines, especially for nerve pain    Topical treatments such as patches or creams    Injections or nerve blocks    Chiropractic or osteopathic treatment    Acupuncture, massage, deep breathing, meditation, visual imagery, aromatherapy    Use heat or ice at the pain site    Physical therapy     Exercise    Stop smoking    Take part in therapy       Risks and side effects     Talk to your doctor before you start or decide to keep taking opioids. Possible side effects include:      Lowering your breathing rate enough to cause death    Overdose, including death, especially if taking higher than prescribed doses    Worse depression symptoms; less pleasure in things you usually enjoy    Feeling tired or sluggish    Slower thoughts or cloudy thinking    Being more sensitive to pain over time; pain is harder to control    Trouble sleeping or restless sleep    Changes in hormone levels (for example, less testosterone)    Changes in sex drive or ability to have sex    Constipation    Unsafe driving    Itching and sweating    Dizziness    Nausea, throwing up and dry mouth    What else should I know about opioids?    Opioids may lead to dependence, tolerance, or addiction.      Dependence means that if you stop or reduce the medicine too quickly, you will have withdrawal symptoms. These include loose poop (diarrhea), jitters, flu-like symptoms,  nervousness and tremors. Dependence is not the same as addiction.                       Tolerance means needing higher doses over time to get the same effect. This may increase the chance of serious side effects.      Addiction is when people improperly use a substance that harms their body, their mind or their relations with others. Use of opiates can cause a relapse of addiction if you have a history of drug or alcohol abuse.      People who have used opioids for a long time may have a lower quality of life, worse depression, higher levels of pain and more visits to doctors.    You can overdose on opioids. Take these steps to lower your risk of overdose:    1. Recognize the signs:  Signs of overdose include decrease or loss of consciousness (blackout), slowed breathing, trouble waking up and blue lips. If someone is worried about overdose, they should call 911.    2. Talk to your doctor about Narcan (naloxone).   If you are at risk for overdose, you may be given a prescription for Narcan. This medicine very quickly reverses the effects of opioids.   If you overdose, a friend or family member can give you Narcan while waiting for the ambulance. They need to know the signs of overdose and how to give Narcan.     3. Don't use alcohol or street drugs.   Taking them with opioids can cause death.    4. Do not take any of these medicines unless your doctor says it s OK. Taking these with opioids can cause death:    Benzodiazepines, such as lorazepam (Ativan), alprazolam (Xanax) or diazepam (Valium)    Muscle relaxers, such as cyclobenzaprine (Flexeril)    Sleeping pills like zolpidem (Ambien)     Other opioids      How to keep you and other people safe while taking opioids:    1. Never share your opioids with others.  Opioid medicines are regulated by the Drug Enforcement Agency ([place]). Selling or sharing medications is a criminal act.    2. Be sure to store opioids in a secure place, locked up if possible. Young children  can easily swallow them and overdose.    3. When you are traveling with your medicines, keep them in the original bottles. If you use a pill box, be sure you also carry a copy of your medicine list from your clinic or pharmacy.    4. Safe disposal of opioids    Most pharmacies have places to get rid of medicine, called disposal kiosks. Medicine disposal options are also available in every [facility]. Search your county and  medication disposal  to find more options. You can find more details at:  [email or web address]     I agree that my provider, clinic care team, and pharmacy may work with any city, state or federal law enforcement agency that investigates the misuse, sale, or other diversion of my controlled medicine. I will allow my provider to discuss my care with, or share a copy of, this agreement with any other treating provider, pharmacy or emergency room where I receive care.    I have read this agreement and have asked questions about anything I did not understand.    _______________________________________________________  Patient Signature - Nataliia Flanagan _____________________                   Date     _______________________________________________________  Provider Signature - Meg Bernardo PA-C   _____________________                   Date     _______________________________________________________  Witness Signature (required if provider not present while patient signing)   _____________________                   Date

## 2022-03-02 ASSESSMENT — ANXIETY QUESTIONNAIRES: GAD7 TOTAL SCORE: 14

## 2022-03-03 LAB — CREAT UR-MCNC: 104 MG/DL

## 2022-03-04 LAB
GABAPENTIN UR QL CFM: PRESENT
N-NORTRAMADOL/CREAT UR CFM: ABNORMAL NG/MG{CREAT}
O-NORTRAMADOL UR CFM-MCNC: ABNORMAL NG/ML
TRAMADOL CTO UR CFM-MCNC: ABNORMAL NG/ML
TRAMADOL/CREAT UR: ABNORMAL

## 2022-03-06 DIAGNOSIS — J30.89 CHRONIC NON-SEASONAL ALLERGIC RHINITIS: ICD-10-CM

## 2022-03-07 RX ORDER — PSEUDOEPHEDRINE HCL 120 MG/1
TABLET, FILM COATED, EXTENDED RELEASE ORAL
Qty: 60 TABLET | Refills: 0 | OUTPATIENT
Start: 2022-03-07

## 2022-03-07 NOTE — TELEPHONE ENCOUNTER
Called pharmacy, they have been out of Sudafed. Should be back in stock next week.       Jodi Childress MA

## 2022-03-07 NOTE — TELEPHONE ENCOUNTER
Patient was given 1 month with 5 refills on 3/1/22. Please contact pharmacy.   Meg Bernardo PA-C

## 2022-03-08 RX ORDER — PSEUDOEPHEDRINE HCL 120 MG/1
120 TABLET, FILM COATED, EXTENDED RELEASE ORAL EVERY 12 HOURS
Qty: 60 TABLET | Refills: 5 | Status: SHIPPED | OUTPATIENT
Start: 2022-03-08 | End: 2022-08-29

## 2022-03-08 NOTE — TELEPHONE ENCOUNTER
Pt calling. States she wants the prescription for Sudafed to be filled through [facility] in [place]. [facility] does not have medication in stock. States they cannot transfer script from [facility]. Pt is requesting new script be sent to [facility] in [place]. Please advise.    Ellen Turner RN  [facility]

## 2022-03-10 DIAGNOSIS — B35.1 ONYCHOMYCOSIS: ICD-10-CM

## 2022-03-10 RX ORDER — TERBINAFINE HYDROCHLORIDE 250 MG/1
250 TABLET ORAL DAILY
Qty: 30 TABLET | Refills: 0 | Status: SHIPPED | OUTPATIENT
Start: 2022-03-10 | End: 2022-04-11

## 2022-03-10 NOTE — TELEPHONE ENCOUNTER
I prescribed 1 month of medication. Patient needs LFT's done in 1 month and then will prescription 2 additional months.   Meg Bernardo PA-C

## 2022-03-10 NOTE — TELEPHONE ENCOUNTER
Reason for Call:  Medication or medication refill:    Do you use a [facility]?  Name of the pharmacy and phone number for the current request:  [facility]    Name of the medication requested: terbinafine (LAMISIL) 250 MG tablet     Patient was prescribed this in the past.  Toe nail fungus is coming back and would like a new prescription.    Can we leave a detailed message on this number? YES    Phone number patient can be reached at: Home number on file [phone] (home)    Best Time: anytime    Call taken on 3/10/2022 at 11:55 AM by Claudine Burns

## 2022-03-11 NOTE — TELEPHONE ENCOUNTER
Patient called back. Informed patient of message below. She fully understood. She will call back once she has taken the medication for a month     Enrrique-

## 2022-03-11 NOTE — TELEPHONE ENCOUNTER
I called and left message with her  to have her call the clinic back regarding message below  Kennedi Santiago CMA

## 2022-03-30 ENCOUNTER — ANCILLARY PROCEDURE (OUTPATIENT)
Dept: MAMMOGRAPHY | Facility: CLINIC | Age: 62
End: 2022-03-30
Attending: PHYSICIAN ASSISTANT
Payer: COMMERCIAL

## 2022-03-30 DIAGNOSIS — Z12.31 VISIT FOR SCREENING MAMMOGRAM: ICD-10-CM

## 2022-03-30 PROCEDURE — 77067 SCR MAMMO BI INCL CAD: CPT | Mod: TC | Performed by: RADIOLOGY

## 2022-04-06 ENCOUNTER — TELEPHONE (OUTPATIENT)
Dept: FAMILY MEDICINE | Facility: CLINIC | Age: 62
End: 2022-04-06
Payer: COMMERCIAL

## 2022-04-06 DIAGNOSIS — M12.532 TRAUMATIC ARTHRITIS OF LEFT WRIST: ICD-10-CM

## 2022-04-06 DIAGNOSIS — B35.1 ONYCHOMYCOSIS: ICD-10-CM

## 2022-04-06 NOTE — TELEPHONE ENCOUNTER
Routing refill request to provider for review/approval because:  Drug not on the [facility] refill protocol     Requested Prescriptions   Pending Prescriptions Disp Refills    terbinafine (LAMISIL) 250 MG tablet [Pharmacy Med Name: TERBINAFINE  MG TABLET] 30 tablet 0     Sig: TAKE 1 TABLET BY MOUTH EVERY DAY        There is no refill protocol information for this order        traMADol (ULTRAM) 50 MG tablet [Pharmacy Med Name: TRAMADOL HCL 50 MG TABLET] 120 tablet 0     Sig: Take 1 tablet (50 mg) by mouth every 6 hours as needed for severe pain        There is no refill protocol information for this order            Sabi Parra RN   [facility]

## 2022-04-07 RX ORDER — TRAMADOL HYDROCHLORIDE 50 MG/1
50 TABLET ORAL EVERY 6 HOURS PRN
Qty: 120 TABLET | Refills: 0 | Status: SHIPPED | OUTPATIENT
Start: 2022-04-07 | End: 2022-05-12

## 2022-04-07 NOTE — TELEPHONE ENCOUNTER
I approved Tramadol refill. Patient needs to get liver function tests prior to refills on the Lamisil. Meg Bernardo PA-C

## 2022-04-08 ENCOUNTER — LAB (OUTPATIENT)
Dept: LAB | Facility: CLINIC | Age: 62
End: 2022-04-08
Payer: COMMERCIAL

## 2022-04-08 DIAGNOSIS — B35.1 ONYCHOMYCOSIS: ICD-10-CM

## 2022-04-08 LAB
ALBUMIN SERPL-MCNC: 3.5 G/DL (ref 3.4–5)
ALP SERPL-CCNC: 125 U/L (ref 40–150)
ALT SERPL W P-5'-P-CCNC: 26 U/L (ref 0–50)
AST SERPL W P-5'-P-CCNC: 23 U/L (ref 0–45)
BILIRUB DIRECT SERPL-MCNC: <0.1 MG/DL (ref 0–0.2)
BILIRUB SERPL-MCNC: 0.2 MG/DL (ref 0.2–1.3)
PROT SERPL-MCNC: 7 G/DL (ref 6.8–8.8)

## 2022-04-08 PROCEDURE — 80076 HEPATIC FUNCTION PANEL: CPT

## 2022-04-08 PROCEDURE — 36415 COLL VENOUS BLD VENIPUNCTURE: CPT

## 2022-04-11 RX ORDER — TERBINAFINE HYDROCHLORIDE 250 MG/1
TABLET ORAL
Qty: 30 TABLET | Refills: 0 | Status: SHIPPED | OUTPATIENT
Start: 2022-04-11 | End: 2022-05-12

## 2022-04-22 ENCOUNTER — ANCILLARY PROCEDURE (OUTPATIENT)
Dept: MAMMOGRAPHY | Facility: CLINIC | Age: 62
End: 2022-04-22
Attending: PHYSICIAN ASSISTANT
Payer: COMMERCIAL

## 2022-04-22 DIAGNOSIS — R92.8 ABNORMAL MAMMOGRAM: ICD-10-CM

## 2022-04-22 PROCEDURE — 77065 DX MAMMO INCL CAD UNI: CPT | Performed by: RADIOLOGY

## 2022-04-22 PROCEDURE — G0279 TOMOSYNTHESIS, MAMMO: HCPCS | Performed by: RADIOLOGY

## 2022-05-08 ENCOUNTER — HEALTH MAINTENANCE LETTER (OUTPATIENT)
Age: 62
End: 2022-05-08

## 2022-05-12 DIAGNOSIS — M12.532 TRAUMATIC ARTHRITIS OF LEFT WRIST: ICD-10-CM

## 2022-05-12 DIAGNOSIS — B35.1 ONYCHOMYCOSIS: ICD-10-CM

## 2022-05-12 RX ORDER — TERBINAFINE HYDROCHLORIDE 250 MG/1
TABLET ORAL
Qty: 30 TABLET | Refills: 1 | Status: SHIPPED | OUTPATIENT
Start: 2022-05-12 | End: 2022-08-29

## 2022-05-12 RX ORDER — TRAMADOL HYDROCHLORIDE 50 MG/1
50 TABLET ORAL EVERY 6 HOURS PRN
Qty: 120 TABLET | Refills: 0 | Status: SHIPPED | OUTPATIENT
Start: 2022-05-12 | End: 2022-06-13

## 2022-05-12 NOTE — TELEPHONE ENCOUNTER
Requested Prescriptions   Pending Prescriptions Disp Refills     terbinafine (LAMISIL) 250 MG tablet [Pharmacy Med Name: TERBINAFINE  MG TABLET] 30 tablet 0     Sig: TAKE 1 TABLET BY MOUTH EVERY DAY       There is no refill protocol information for this order        traMADol (ULTRAM) 50 MG tablet [Pharmacy Med Name: TRAMADOL HCL 50 MG TABLET] 120 tablet 0     Sig: TAKE 1 TABLET (50 MG) BY MOUTH EVERY 6 HOURS AS NEEDED FOR SEVERE PAIN       There is no refill protocol information for this order

## 2022-06-11 DIAGNOSIS — M12.532 TRAUMATIC ARTHRITIS OF LEFT WRIST: ICD-10-CM

## 2022-06-13 RX ORDER — TRAMADOL HYDROCHLORIDE 50 MG/1
50 TABLET ORAL EVERY 6 HOURS PRN
Qty: 120 TABLET | Refills: 0 | Status: SHIPPED | OUTPATIENT
Start: 2022-06-13 | End: 2022-07-14

## 2022-06-13 NOTE — TELEPHONE ENCOUNTER
Due for follow up. Last fill without an appointment. Can be virtual or in person.   Meg Bernardo PA-C

## 2022-07-13 DIAGNOSIS — M12.532 TRAUMATIC ARTHRITIS OF LEFT WRIST: ICD-10-CM

## 2022-07-13 DIAGNOSIS — B35.1 ONYCHOMYCOSIS: ICD-10-CM

## 2022-07-13 NOTE — TELEPHONE ENCOUNTER
Requested Prescriptions   Pending Prescriptions Disp Refills     terbinafine (LAMISIL) 250 MG tablet [Pharmacy Med Name: TERBINAFINE  MG TABLET] 30 tablet 1     Sig: TAKE 1 TABLET BY MOUTH EVERY DAY       There is no refill protocol information for this order        traMADol (ULTRAM) 50 MG tablet [Pharmacy Med Name: TRAMADOL HCL 50 MG TABLET] 120 tablet 0     Sig: TAKE 1 TABLET (50 MG) BY MOUTH EVERY 6 HOURS AS NEEDED FOR SEVERE PAIN       There is no refill protocol information for this order

## 2022-07-14 RX ORDER — TERBINAFINE HYDROCHLORIDE 250 MG/1
TABLET ORAL
Qty: 30 TABLET | Refills: 1 | OUTPATIENT
Start: 2022-07-14

## 2022-07-14 RX ORDER — TRAMADOL HYDROCHLORIDE 50 MG/1
50 TABLET ORAL EVERY 6 HOURS PRN
Qty: 120 TABLET | Refills: 0 | Status: SHIPPED | OUTPATIENT
Start: 2022-07-14 | End: 2022-08-17

## 2022-07-14 NOTE — TELEPHONE ENCOUNTER
Patient overdue for office visit. Must have virtual or in person appointment prior to next refill.   Meg Bernardo PA-C

## 2022-07-15 NOTE — TELEPHONE ENCOUNTER
I called and notified patient of message below. She will schedule appointment at a later date  Kennedi Santiago CMA

## 2022-07-18 DIAGNOSIS — F33.1 MAJOR DEPRESSIVE DISORDER, RECURRENT EPISODE, MODERATE (H): ICD-10-CM

## 2022-07-19 RX ORDER — ARIPIPRAZOLE 5 MG/1
TABLET ORAL
Qty: 90 TABLET | Refills: 1 | Status: SHIPPED | OUTPATIENT
Start: 2022-07-19 | End: 2023-01-20

## 2022-07-19 NOTE — TELEPHONE ENCOUNTER
Routing refill request to provider for review/approval because:  Drug not on the [facility] refill protocol   PHQ-9 score:    PHQ 3/1/2022   PHQ-9 Total Score 11   Q9: Thoughts of better off dead/self-harm past 2 weeks Not at all       Lab Results   Component Value Date    CHOL 194 07/20/2018     Lab Results   Component Value Date    HDL 78 07/20/2018     Lab Results   Component Value Date     07/20/2018     Lab Results   Component Value Date    TRIG 67 07/20/2018     Lab Results   Component Value Date    CHOLHDLRATIO 3.4 07/03/2012

## 2022-08-07 DIAGNOSIS — G47.00 PERSISTENT INSOMNIA: ICD-10-CM

## 2022-08-08 RX ORDER — TRAZODONE HYDROCHLORIDE 100 MG/1
TABLET ORAL
Qty: 90 TABLET | Refills: 0 | Status: SHIPPED | OUTPATIENT
Start: 2022-08-08 | End: 2022-11-04

## 2022-08-08 NOTE — TELEPHONE ENCOUNTER
Routing refill request to provider for review/approval because:  Failed protocol.      Jil Berg RN

## 2022-08-16 ENCOUNTER — TELEPHONE (OUTPATIENT)
Dept: FAMILY MEDICINE | Facility: CLINIC | Age: 62
End: 2022-08-16

## 2022-08-16 DIAGNOSIS — M12.532 TRAUMATIC ARTHRITIS OF LEFT WRIST: ICD-10-CM

## 2022-08-16 RX ORDER — TRAMADOL HYDROCHLORIDE 50 MG/1
50 TABLET ORAL EVERY 6 HOURS PRN
Qty: 120 TABLET | Refills: 0 | OUTPATIENT
Start: 2022-08-16

## 2022-08-16 NOTE — TELEPHONE ENCOUNTER
Patient was notified on 7/15/22 by staff that she needed an in person or virtual visit prior to another fill. Denied. If patient makes an appointment will fill until appointment.   Meg Bernardo PA-C

## 2022-08-16 NOTE — TELEPHONE ENCOUNTER
Routing refill request to provider for review/approval because:  Drug not on the [facility] refill protocol     Requested Prescriptions   Pending Prescriptions Disp Refills    traMADol (ULTRAM) 50 MG tablet [Pharmacy Med Name: TRAMADOL HCL 50 MG TABLET] 120 tablet 0     Sig: TAKE 1 TABLET (50 MG) BY MOUTH EVERY 6 HOURS AS NEEDED FOR SEVERE PAIN        There is no refill protocol information for this order            Sabi Parra RN   [facility]

## 2022-08-17 RX ORDER — TRAMADOL HYDROCHLORIDE 50 MG/1
50 TABLET ORAL EVERY 6 HOURS PRN
Qty: 56 TABLET | Refills: 0 | Status: SHIPPED | OUTPATIENT
Start: 2022-08-17 | End: 2022-08-29

## 2022-08-17 NOTE — TELEPHONE ENCOUNTER
Meg     Pt has future appt scheduled:        Thanks,  SHERRELL Restrepo  [facility]

## 2022-08-29 ENCOUNTER — VIRTUAL VISIT (OUTPATIENT)
Dept: FAMILY MEDICINE | Facility: CLINIC | Age: 62
End: 2022-08-29
Payer: COMMERCIAL

## 2022-08-29 DIAGNOSIS — M19.132 POST-TRAUMATIC OSTEOARTHRITIS OF LEFT WRIST: ICD-10-CM

## 2022-08-29 DIAGNOSIS — M25.532 CHRONIC PAIN OF LEFT WRIST: ICD-10-CM

## 2022-08-29 DIAGNOSIS — G89.29 CHRONIC PAIN OF LEFT WRIST: ICD-10-CM

## 2022-08-29 DIAGNOSIS — F33.1 MAJOR DEPRESSIVE DISORDER, RECURRENT EPISODE, MODERATE (H): ICD-10-CM

## 2022-08-29 DIAGNOSIS — G47.00 PERSISTENT INSOMNIA: ICD-10-CM

## 2022-08-29 DIAGNOSIS — L60.3 BRITTLE NAILS: Primary | ICD-10-CM

## 2022-08-29 DIAGNOSIS — M12.532 TRAUMATIC ARTHRITIS OF LEFT WRIST: ICD-10-CM

## 2022-08-29 DIAGNOSIS — J30.89 CHRONIC NON-SEASONAL ALLERGIC RHINITIS: ICD-10-CM

## 2022-08-29 PROCEDURE — 96127 BRIEF EMOTIONAL/BEHAV ASSMT: CPT | Performed by: PHYSICIAN ASSISTANT

## 2022-08-29 PROCEDURE — 99214 OFFICE O/P EST MOD 30 MIN: CPT | Mod: 95 | Performed by: PHYSICIAN ASSISTANT

## 2022-08-29 RX ORDER — GABAPENTIN 600 MG/1
600 TABLET ORAL 3 TIMES DAILY
Qty: 270 TABLET | Refills: 0 | Status: SHIPPED | OUTPATIENT
Start: 2022-08-29 | End: 2023-02-27

## 2022-08-29 RX ORDER — TRAMADOL HYDROCHLORIDE 50 MG/1
50 TABLET ORAL EVERY 6 HOURS PRN
Qty: 120 TABLET | Refills: 0 | Status: SHIPPED | OUTPATIENT
Start: 2022-08-29 | End: 2022-10-03

## 2022-08-29 RX ORDER — FLUOXETINE 40 MG/1
40 CAPSULE ORAL DAILY
Qty: 90 CAPSULE | Refills: 1 | Status: SHIPPED | OUTPATIENT
Start: 2022-08-29 | End: 2023-01-31

## 2022-08-29 RX ORDER — PSEUDOEPHEDRINE HCL 120 MG/1
120 TABLET, FILM COATED, EXTENDED RELEASE ORAL EVERY 12 HOURS
Qty: 60 TABLET | Refills: 5 | Status: SHIPPED | OUTPATIENT
Start: 2022-08-29 | End: 2023-01-31

## 2022-08-29 RX ORDER — POLY-UREAURETHANE 16 %
NAIL FILM SOLUTION (ML) TOPICAL AT BEDTIME
Qty: 15 ML | Refills: 5 | Status: SHIPPED | OUTPATIENT
Start: 2022-08-29 | End: 2024-04-16

## 2022-08-29 ASSESSMENT — PATIENT HEALTH QUESTIONNAIRE - PHQ9
10. IF YOU CHECKED OFF ANY PROBLEMS, HOW DIFFICULT HAVE THESE PROBLEMS MADE IT FOR YOU TO DO YOUR WORK, TAKE CARE OF THINGS AT HOME, OR GET ALONG WITH OTHER PEOPLE: SOMEWHAT DIFFICULT
SUM OF ALL RESPONSES TO PHQ QUESTIONS 1-9: 8
SUM OF ALL RESPONSES TO PHQ QUESTIONS 1-9: 8

## 2022-08-29 NOTE — PROGRESS NOTES
Nataliia is a 61 year old who is being evaluated via a billable telephone visit.      What phone number would you like to be contacted at? [phone]  How would you like to obtain your AVS? Kevin    Assessment & Plan     1. Brittle nails    2. Chronic pain of left wrist    3. Post-traumatic osteoarthritis of left wrist    4. Major depressive disorder, recurrent episode, moderate (H)    5. Persistent insomnia    6. Traumatic arthritis of left wrist    7. Chronic non-seasonal allergic rhinitis      Chronic pain well managed with tramadol-keeps her functional and working. Pain improved with increased gabapentin but patient would like to try a higher dose. Increase to 600mg TID.   Refilled chronic meds, depression worsens seasonally so monitor as fall approaches.   Follow up in person in 3 months.                  No follow-ups on file.    Meg Bernardo PA-C  [facility]   Nataliia is a 61 year old, presenting for the following health issues:  No chief complaint on file.      History of Present Illness       Reason for visit:  Update medications    She eats 0-1 servings of fruits and vegetables daily.She consumes 3 sweetened beverage(s) daily.She exercises with enough effort to increase her heart rate 60 or more minutes per day.  She exercises with enough effort to increase her heart rate 5 days per week.   She is taking medications regularly.    Today's PHQ-9         PHQ-9 Total Score: 8    PHQ-9 Q9 Thoughts of better off dead/self-harm past 2 weeks :   Not at all    How difficult have these problems made it for you to do your work, take care of things at home, or get along with other people: Somewhat difficult       Pain History:  When did you first notice your pain? - Chronic Pain   Have you seen this provider for your pain in the past?   Yes   Where in your body do you have pain? Left Wrist  Are you seeing anyone else for your pain? Yes - but it has ended     PHQ-9 SCORE  1/28/2022 3/1/2022 8/29/2022   PHQ-9 Total Score - - -   PHQ-9 Total Score MyChart 9 (Mild depression) - 8 (Mild depression)   PHQ-9 Total Score 9 11 8       CAROLYN-7 SCORE 8/3/2016 1/28/2022 3/1/2022   Total Score - 13 (moderate anxiety) -   Total Score 17 13 14           PEG Score 1/28/2022 3/1/2022 8/29/2022   PEG Total Score 3.67 5 3       Chronic Pain Follow Up:    Location of pain: left wrist- pain medication allows her to work.   Analgesia/pain control:    - Recent changes:  No recent changes.     - Overall control: Tolerable with discomfort    - Current treatments: gabapentin     Adherence:     - Do you ever take more pain medicine than prescribed? No    - When did you take your last dose of pain medicine?  today   Adverse effects: No   PDMP Review       Value Time User    State PDMP site checked  Yes 5/12/2022 11:25 AM Meg Bernardo, MAC        Last CSA Agreement:   CSA -- Patient Level:    Controlled Substance Agreement - Opioid - Scan on 3/1/2022 11:53 AM  Controlled Substance Agreement - Opioid - Scan on 1/12/2021  7:50 AM  Controlled Substance Agreement - Opioid - Scan on 7/15/2019  1:42 PM       Last UDS: 3/4/2022          Increased gabapentin has helped. No increased side effects.   Pain medication amounts is unchanged, but makes more tolerable.     Mood- has been doing ok right now. Seasonal affective, does not think counseling would be effective.     Review of Systems         Objective           Vitals:  No vitals were obtained today due to virtual visit.    Physical Exam   healthy, alert and no distress  PSYCH: Alert and oriented times 3; coherent speech, normal   rate and volume, able to articulate logical thoughts, able   to abstract reason, no tangential thoughts, no hallucinations   or delusions  Her affect is normal  RESP: No cough, no audible wheezing, able to talk in full sentences  Remainder of exam unable to be completed due to telephone visits                Phone call duration: 9  minutes    .  ..

## 2022-09-06 ENCOUNTER — OFFICE VISIT (OUTPATIENT)
Dept: FAMILY MEDICINE | Facility: CLINIC | Age: 62
End: 2022-09-06
Payer: COMMERCIAL

## 2022-09-06 ENCOUNTER — ANCILLARY PROCEDURE (OUTPATIENT)
Dept: GENERAL RADIOLOGY | Facility: CLINIC | Age: 62
End: 2022-09-06
Attending: PHYSICIAN ASSISTANT
Payer: COMMERCIAL

## 2022-09-06 VITALS
BODY MASS INDEX: 18.38 KG/M2 | TEMPERATURE: 98.2 F | WEIGHT: 104.6 LBS | HEART RATE: 79 BPM | SYSTOLIC BLOOD PRESSURE: 165 MMHG | DIASTOLIC BLOOD PRESSURE: 83 MMHG | OXYGEN SATURATION: 99 %

## 2022-09-06 DIAGNOSIS — M53.3 PAIN IN THE COCCYX: ICD-10-CM

## 2022-09-06 DIAGNOSIS — R07.81 RIB PAIN ON LEFT SIDE: ICD-10-CM

## 2022-09-06 DIAGNOSIS — S50.311A ABRASION OF RIGHT ELBOW, INITIAL ENCOUNTER: ICD-10-CM
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
## 2022-09-06 PROCEDURE — 72220 X-RAY EXAM SACRUM TAILBONE: CPT | Mod: TC | Performed by: RADIOLOGY

## 2022-09-06 PROCEDURE — 99214 OFFICE O/P EST MOD 30 MIN: CPT | Performed by: PHYSICIAN ASSISTANT

## 2022-09-06 ASSESSMENT — ENCOUNTER SYMPTOMS
CHILLS: 0
VOMITING: 0
NERVOUS/ANXIOUS: 0
HEADACHES: 0
SHORTNESS OF BREATH: 0
WEAKNESS: 0
FEVER: 0
PARESTHESIAS: 0
DIZZINESS: 0
NECK PAIN: 0
LIGHT-HEADEDNESS: 0
NAUSEA: 0
COUGH: 0
ABDOMINAL PAIN: 0
NUMBNESS: 0

## 2022-09-06 ASSESSMENT — PAIN SCALES - GENERAL: PAINLEVEL: MODERATE PAIN (5)

## 2022-09-06 NOTE — PROGRESS NOTES
Assessment & Plan     Fall, initial encounter  Pain in the coccyx  Rib pain on left side  Abrasion of right elbow, initial encounter  Patient is a 61-year-old female who presents to clinic due to fall that occurred 3 days ago the patient tripped and fell causing her to slide down a rock embankment approximately 30 feet.  Patient notes abrasion of right elbow, pain in coccyx, and pain and left ribs.  Patient did hit head, but no headache, neurological deficits, or loss of consciousness.  Vital signs without fever or tachycardia.    Right elbow abrasion: Patient does have deep abrasion in this area.  No signs of infection.  Normal range of motion of elbow.  Recommended to continue to apply topical antibiotic ointment and keeping the area clean and covered while it heals.    Left rib pain: X-rays completed in clinic and reviewed.  No signs of fracture.  X-ray report without fracture.  Symptoms likely due to strain/contusion.  Recommended ice and Tylenol/ibuprofen.    Coccyx pain: X-rays completed in clinic and reviewed.  No sign of obvious fracture.  X-ray report does note that overlapping structures make surgery difficult.  Based on symptoms and exam, symptoms are most likely due to contusion.  Recommended patient return for follow-up if symptoms are worsening, or not improving over the next few weeks.  Ice and Tylenol/ibuprofen recommended for treatment.    Return and urgent follow precautions provided.    - XR Sacrum and Coccyx 2 Views; Future  - XR Ribs & Chest Left G/E 3 Views; Future       See Patient Instructions    Return in about 1 week (around 9/13/2022), or if symptoms worsen or fail to improve.    Alejandra Gaviria PA-C  [facility] ANJUM William is a 61 year old, presenting for the following health issues:  Musculoskeletal Problem      HPI     Concern - rib pain  Onset: 9/3/2022  Description: fell on 9/3. Left side rib pain  Intensity: moderate, severe  Progression of  Symptoms:  same  Accompanying Signs & Symptoms: none  Previous history of similar problem: none  Precipitating factors:        Worsened by: movement  Alleviating factors:        Improved by: none  Therapies tried and outcome: tramadol not helpful     Patient notes [place] she was out looking for rocks and tripped and fell. Patient did hit her head but did not lose consciousness. She slid down around 30 feet. She has scrapes on bilateral arms and legs. She notes significant left rib pain. Ribs hurt worse with breathing in and out. Tailbone is painful. No mid/upper back or neck pain.     Review of Systems   Constitutional: Negative for chills and fever.   Eyes: Negative for visual disturbance.   Respiratory: Negative for cough and shortness of breath.    Cardiovascular: Negative for chest pain.   Gastrointestinal: Negative for abdominal pain, nausea and vomiting.   Musculoskeletal: Negative for neck pain.   Skin: Negative for rash.   Neurological: Negative for dizziness, weakness, light-headedness, numbness, headaches and paresthesias.   Psychiatric/Behavioral: The patient is not nervous/anxious.             Objective    BP (!) 165/83 (BP Location: Left arm, Cuff Size: Adult Regular)   Pulse 79   Temp 98.2  F (36.8  C) (Temporal)   Wt 47.4 kg (104 lb 9.6 oz)   SpO2 99%   BMI 18.38 kg/m    Body mass index is 18.38 kg/m .  Physical Exam  Vitals and nursing note reviewed.   Constitutional:       General: She is not in acute distress.     Appearance: Normal appearance.   HENT:      Head: Normocephalic and atraumatic.      Mouth/Throat:      Mouth: Mucous membranes are moist.      Pharynx: Oropharynx is clear.   Eyes:      Extraocular Movements: Extraocular movements intact.      Pupils: Pupils are equal, round, and reactive to light.   Cardiovascular:      Rate and Rhythm: Normal rate and regular rhythm.      Heart sounds: Normal heart sounds.   Pulmonary:      Effort: Pulmonary effort is  normal.      Breath sounds: Normal breath sounds.   Abdominal:      General: Bowel sounds are normal.      Palpations: Abdomen is soft.      Tenderness: There is no abdominal tenderness. There is no guarding or rebound.   Musculoskeletal:         General: Normal range of motion.      Cervical back: Normal range of motion.      Comments: Tenderness to palpation over left lateral ribs without deformity, swelling, erythema, ecchymosis, crepitus.    Tenderness to palpation over entire sacrum and coccyx without palpable swelling.   Skin:     General: Skin is warm and dry.      Comments: Deep abrasion to right elbow, lateral aspect with ointment and bandage in place. No significant discharge, swelling, fluctuance. Mild erythema.  Multiple superficial abrasions with scabbing on bilateral arms and legs without induration, fluctuance, swelling.    Neurological:      General: No focal deficit present.      Mental Status: She is alert.   Psychiatric:         Mood and Affect: Mood normal.         Behavior: Behavior normal.            XR ribs Left and chest, 3 views:  IMPRESSION: No rib fracture.  Normal heart and lungs. Postoperative  changes in both breasts.    XR sacrum and coccyx, 2 views:  IMPRESSION: There is angulation at the sacrococcygeal junction where  there is overlapping structures making assessment difficult. While no  discrete fracture line is identified, a fracture near the  sacrococcygeal junction could cause this appearance. Recommend  dedicated MRI or CT for further evaluation.

## 2022-09-06 NOTE — PATIENT INSTRUCTIONS
Good news, I do not see any signs of fracture on your tailbone or rib x-rays.  The radiologist will also review these and will do an official report which will be sent to your MyChart.  Your symptoms are most likely due to contusion/bruising.  For treatment you can use ice and Tylenol.  Your symptoms should improve over the next week.     For the deep skin cut/abrasion on your elbow, make sure to keep this area clean.  I would highly recommend continue to apply antibiotic ointment twice daily.  Continue to monitor the area and if it develops worsening symptoms, please return to clinic.     Please reach out with questions or concerns.  Return to clinic for any new or worsening symptoms.

## 2022-09-06 NOTE — LETTER
September 6, 2022      Nataliia Flanagan  [address] [id number]        To Whom It May Concern:    Nataliia Flanagan was seen in our clinic. She may return to work 9/8/22 without restrictions.      Sincerely,        Alejandra Gaviria PA-C

## 2022-10-02 DIAGNOSIS — M12.532 TRAUMATIC ARTHRITIS OF LEFT WRIST: ICD-10-CM

## 2022-10-03 RX ORDER — TRAMADOL HYDROCHLORIDE 50 MG/1
50 TABLET ORAL EVERY 6 HOURS PRN
Qty: 120 TABLET | Refills: 0 | Status: SHIPPED | OUTPATIENT
Start: 2022-10-03 | End: 2022-11-10

## 2022-10-13 ENCOUNTER — E-VISIT (OUTPATIENT)
Dept: FAMILY MEDICINE | Facility: CLINIC | Age: 62
End: 2022-10-13
Payer: COMMERCIAL

## 2022-10-13 ENCOUNTER — TELEPHONE (OUTPATIENT)
Dept: FAMILY MEDICINE | Facility: CLINIC | Age: 62
End: 2022-10-13

## 2022-10-13 DIAGNOSIS — J01.90 ACUTE BACTERIAL SINUSITIS: Primary | ICD-10-CM

## 2022-10-13 DIAGNOSIS — B96.89 ACUTE BACTERIAL SINUSITIS: Primary | ICD-10-CM

## 2022-10-13 PROCEDURE — 99421 OL DIG E/M SVC 5-10 MIN: CPT | Performed by: PHYSICIAN ASSISTANT

## 2022-10-13 NOTE — TELEPHONE ENCOUNTER
"Patient calling to request antibiotics for sinus infection. She has had thick nasal discharge and, \"blows it and blow it and blow it and it never stops.\" also reports headache.    No fever but feeling hot and cold    Symptoms 3 weeks.     E visit advised, patient agrees with plan.   "

## 2022-10-13 NOTE — PATIENT INSTRUCTIONS
Sinusitis (Antibiotic Treatment)    The sinuses are air-filled spaces within the bones of the face. They connect to the inside of the nose. Sinusitis is an inflammation of the tissue that lines the sinuses. Sinusitis can occur during a cold. It can also happen due to allergies to pollens and other particles in the air. Sinusitis can cause symptoms of sinus congestion and a feeling of fullness. A sinus infection causes fever, headache, and facial pain. There is often green or yellow fluid draining from the nose or into the back of the throat (post-nasal drip). You have been given antibiotics to treat this condition.   Home care    Take the full course of antibiotics as instructed. Don't stop taking them, even when you feel better.    Drink plenty of water, hot tea, and other liquids as directed by the healthcare provider. This may help thin nasal mucus. It also may help your sinuses drain fluids.    Heat may help soothe painful areas of your face. Use a towel soaked in hot water. Or,  the shower and direct the warm spray onto your face. Using a vaporizer along with a menthol rub at night may also help soothe symptoms.     An expectorant with guaifenesin may help thin nasal mucus and help your sinuses drain fluids. Talk with your provider or pharmacists before taking an over-the-counter (OTC) medicine if you have any questions about it or its side effects..    You can use an OTC decongestant, unless a similar medicine was prescribed to you. Nasal sprays work the fastest. Use one that contains phenylephrine or oxymetazoline. First blow your nose gently. Then use the spray. Don't use these medicines more often than directed on the label. If you do, your symptoms may get worse. You may also take pills that contain pseudoephedrine. Don t use products that combine multiple medicines. This is because side effects may be increased. Read labels. You can also ask the pharmacist for help. (People with high blood  pressure should not use decongestants. They can raise blood pressure.) Talk with your provider or pharmacist if you have any questions about the medicine..    OTC antihistamines may help if allergies contributed to your sinusitis. Talk with your provider or pharmacist if you have any questions about the medicine..    Don't use nasal rinses or irrigation during an acute sinus infection, unless your healthcare provider tells you to. Rinsing may spread the infection to other areas in your sinuses.    Use acetaminophen or ibuprofen to control pain, unless another pain medicine was prescribed to you. If you have chronic liver or kidney disease or ever had a stomach ulcer, talk with your healthcare provider before using these medicines. Never give aspirin to anyone under age 18 who is ill with a fever. It may cause severe liver damage.    Don't smoke. This can make symptoms worse.    Follow-up care  Follow up with your healthcare provider, or as advised.   When to seek medical advice  Call your healthcare provider if any of these occur:     Facial pain or headache that gets worse    Stiff neck    Unusual drowsiness or confusion    Swelling of your forehead or eyelids    Symptoms don't go away in 10 days    Vision problems, such as blurred or double vision    Fever of 100.4 F (38 C) or higher, or as directed by your healthcare provider  Call 911  Call 911 if any of these occur:     Seizure    Trouble breathing    Feeling dizzy or faint    Fingernails, skin or lips look blue, purple , or gray  Prevention  Here are steps you can take to help prevent an infection:     Keep good hand washing habits.    Don t have close contact with people who have sore throats, colds, or other upper respiratory infections.    Don t smoke, and stay away from secondhand smoke.    Stay up to date with of your vaccines.  [employer] last reviewed this educational content on 12/1/2019 2000-2021 [employer]. All rights reserved. This  information is not intended as a substitute for professional medical care. Always follow your healthcare professional's instructions.        Dear Nataliia Flanagan    After reviewing your responses, I've been able to diagnose you with?a sinus infection caused by bacteria.?     Based on your responses and diagnosis, I have prescribed Augmentin to treat your symptoms. I have sent this to your pharmacy.?     It is also important to stay well hydrated, get lots of rest and take over-the-counter decongestants,?tylenol?or ibuprofen if you?are able to?take those medications per your primary care provider to help relieve discomfort.?     It is important that you take?all of?your prescribed medication even if your symptoms are improving after a few doses.? Taking?all of?your medicine helps prevent the symptoms from returning.?     If your symptoms worsen, you develop severe headache, vomiting, high fever (>102), or are not improving in 7 days, please contact your primary care provider for an appointment or visit any of our convenient Walk-in Care or Urgent Care Centers to be seen which can be found on our website?here.?     Thanks again for choosing?us?as your health care partner,?   ?  Meg Bernardo PA-C?

## 2022-11-04 DIAGNOSIS — G47.00 PERSISTENT INSOMNIA: ICD-10-CM

## 2022-11-04 RX ORDER — TRAZODONE HYDROCHLORIDE 100 MG/1
TABLET ORAL
Qty: 90 TABLET | Refills: 0 | Status: SHIPPED | OUTPATIENT
Start: 2022-11-04 | End: 2023-01-31

## 2022-11-10 DIAGNOSIS — M12.532 TRAUMATIC ARTHRITIS OF LEFT WRIST: ICD-10-CM

## 2022-11-10 RX ORDER — TRAMADOL HYDROCHLORIDE 50 MG/1
50 TABLET ORAL EVERY 6 HOURS PRN
Qty: 120 TABLET | Refills: 0 | Status: SHIPPED | OUTPATIENT
Start: 2022-11-10 | End: 2022-12-12

## 2022-11-10 NOTE — TELEPHONE ENCOUNTER
Called and left voicemail notifying patient that she will need to make an appointment for an in person visit prior to her next refill.

## 2022-11-10 NOTE — TELEPHONE ENCOUNTER
Refilled. Patient due for an in person appointment prior to her next refill.   Meg Bernardo PA-C

## 2022-12-10 ENCOUNTER — TELEPHONE (OUTPATIENT)
Dept: FAMILY MEDICINE | Facility: CLINIC | Age: 62
End: 2022-12-10

## 2022-12-10 DIAGNOSIS — M12.532 TRAUMATIC ARTHRITIS OF LEFT WRIST: ICD-10-CM

## 2022-12-12 RX ORDER — TRAMADOL HYDROCHLORIDE 50 MG/1
50 TABLET ORAL EVERY 6 HOURS PRN
Qty: 120 TABLET | Refills: 0 | OUTPATIENT
Start: 2022-12-12

## 2022-12-12 RX ORDER — TRAMADOL HYDROCHLORIDE 50 MG/1
50 TABLET ORAL EVERY 6 HOURS PRN
Qty: 120 TABLET | Refills: 0 | Status: SHIPPED | OUTPATIENT
Start: 2022-12-12 | End: 2023-01-16

## 2022-12-12 NOTE — TELEPHONE ENCOUNTER
Denied. Patient needs to make an appointment in person. If patient makes an appointment I will fill until the appointment, but patient was informed last month she needed an appointment.   Meg Bernardo PA-C

## 2022-12-14 DIAGNOSIS — B35.1 ONYCHOMYCOSIS: ICD-10-CM

## 2022-12-14 RX ORDER — TERBINAFINE HYDROCHLORIDE 250 MG/1
TABLET ORAL
Qty: 30 TABLET | Refills: 1 | OUTPATIENT
Start: 2022-12-14

## 2023-01-01 NOTE — TELEPHONE ENCOUNTER
NO answer, Left message to call back.   Right ear hearing screen completed date: 2023  Right ear screen method: EOAE (evoked otoacoustic emission)  Right ear screen result: Passed  Right ear screen comment: N/A    Left ear hearing screen completed date: 2023  Left ear screen method: EOAE (evoked otoacoustic emission)  Left ear screen result: Passed  Left ear screen comments: N/A

## 2023-01-13 DIAGNOSIS — M12.532 TRAUMATIC ARTHRITIS OF LEFT WRIST: ICD-10-CM

## 2023-01-16 RX ORDER — TRAMADOL HYDROCHLORIDE 50 MG/1
50 TABLET ORAL EVERY 6 HOURS PRN
Qty: 60 TABLET | Refills: 0 | Status: SHIPPED | OUTPATIENT
Start: 2023-01-16 | End: 2023-01-31

## 2023-01-16 NOTE — TELEPHONE ENCOUNTER
2 weeks only patient must keep appointment on 1/31. If she cancels or reschedules refills will be denied until in person appointment.   Meg Bernardo PA-C

## 2023-01-20 DIAGNOSIS — F33.1 MAJOR DEPRESSIVE DISORDER, RECURRENT EPISODE, MODERATE (H): ICD-10-CM

## 2023-01-20 RX ORDER — ARIPIPRAZOLE 5 MG/1
TABLET ORAL
Qty: 90 TABLET | Refills: 1 | Status: SHIPPED | OUTPATIENT
Start: 2023-01-20 | End: 2023-08-07

## 2023-01-31 ENCOUNTER — OFFICE VISIT (OUTPATIENT)
Dept: FAMILY MEDICINE | Facility: CLINIC | Age: 63
End: 2023-01-31
Payer: COMMERCIAL

## 2023-01-31 VITALS
HEART RATE: 87 BPM | HEIGHT: 64 IN | BODY MASS INDEX: 17.79 KG/M2 | RESPIRATION RATE: 16 BRPM | WEIGHT: 104.2 LBS | OXYGEN SATURATION: 100 % | SYSTOLIC BLOOD PRESSURE: 129 MMHG | TEMPERATURE: 97.7 F | DIASTOLIC BLOOD PRESSURE: 79 MMHG

## 2023-01-31 DIAGNOSIS — G47.00 PERSISTENT INSOMNIA: ICD-10-CM

## 2023-01-31 DIAGNOSIS — M79.10 MYALGIA: ICD-10-CM

## 2023-01-31 DIAGNOSIS — F33.1 MAJOR DEPRESSIVE DISORDER, RECURRENT EPISODE, MODERATE (H): Primary | ICD-10-CM

## 2023-01-31 DIAGNOSIS — J30.89 CHRONIC NON-SEASONAL ALLERGIC RHINITIS: ICD-10-CM

## 2023-01-31 DIAGNOSIS — B35.1 ONYCHOMYCOSIS: ICD-10-CM

## 2023-01-31 DIAGNOSIS — Z79.899 HIGH RISK MEDICATION USE: ICD-10-CM

## 2023-01-31 DIAGNOSIS — G89.4 CHRONIC PAIN SYNDROME: ICD-10-CM

## 2023-01-31 DIAGNOSIS — M12.532 TRAUMATIC ARTHRITIS OF LEFT WRIST: ICD-10-CM

## 2023-01-31 DIAGNOSIS — K25.9 GASTRIC ULCER WITHOUT HEMORRHAGE OR PERFORATION, UNSPECIFIED CHRONICITY: ICD-10-CM

## 2023-01-31 LAB
AMPHETAMINES UR QL: NOT DETECTED
BARBITURATES UR QL SCN: NOT DETECTED
BASOPHILS # BLD AUTO: 0.1 10E3/UL (ref 0–0.2)
BASOPHILS NFR BLD AUTO: 2 %
BENZODIAZ UR QL SCN: DETECTED
BUPRENORPHINE UR QL: NOT DETECTED
CANNABINOIDS UR QL: NOT DETECTED
COCAINE UR QL SCN: NOT DETECTED
D-METHAMPHET UR QL: NOT DETECTED
EOSINOPHIL # BLD AUTO: 0.2 10E3/UL (ref 0–0.7)
EOSINOPHIL NFR BLD AUTO: 4 %
ERYTHROCYTE [DISTWIDTH] IN BLOOD BY AUTOMATED COUNT: 13.1 % (ref 10–15)
HCT VFR BLD AUTO: 35.5 % (ref 35–47)
HGB BLD-MCNC: 11.7 G/DL (ref 11.7–15.7)
LYMPHOCYTES # BLD AUTO: 1.7 10E3/UL (ref 0.8–5.3)
LYMPHOCYTES NFR BLD AUTO: 33 %
MCH RBC QN AUTO: 28.6 PG (ref 26.5–33)
MCHC RBC AUTO-ENTMCNC: 33 G/DL (ref 31.5–36.5)
MCV RBC AUTO: 87 FL (ref 78–100)
METHADONE UR QL SCN: NOT DETECTED
MONOCYTES # BLD AUTO: 0.5 10E3/UL (ref 0–1.3)
MONOCYTES NFR BLD AUTO: 9 %
NEUTROPHILS # BLD AUTO: 2.7 10E3/UL (ref 1.6–8.3)
NEUTROPHILS NFR BLD AUTO: 52 %
OPIATES UR QL SCN: NOT DETECTED
OXYCODONE UR QL SCN: NOT DETECTED
PCP UR QL SCN: NOT DETECTED
PLATELET # BLD AUTO: 310 10E3/UL (ref 150–450)
PROPOXYPH UR QL: NOT DETECTED
RBC # BLD AUTO: 4.09 10E6/UL (ref 3.8–5.2)
TRICYCLICS UR QL SCN: NOT DETECTED
VIT B12 SERPL-MCNC: 969 PG/ML (ref 232–1245)
WBC # BLD AUTO: 5.1 10E3/UL (ref 4–11)

## 2023-01-31 PROCEDURE — 83550 IRON BINDING TEST: CPT | Performed by: PHYSICIAN ASSISTANT

## 2023-01-31 PROCEDURE — 85025 COMPLETE CBC W/AUTO DIFF WBC: CPT | Performed by: PHYSICIAN ASSISTANT

## 2023-01-31 PROCEDURE — 36415 COLL VENOUS BLD VENIPUNCTURE: CPT | Performed by: PHYSICIAN ASSISTANT

## 2023-01-31 PROCEDURE — 82728 ASSAY OF FERRITIN: CPT | Performed by: PHYSICIAN ASSISTANT

## 2023-01-31 PROCEDURE — 99214 OFFICE O/P EST MOD 30 MIN: CPT | Performed by: PHYSICIAN ASSISTANT

## 2023-01-31 PROCEDURE — 80306 DRUG TEST PRSMV INSTRMNT: CPT | Performed by: PHYSICIAN ASSISTANT

## 2023-01-31 PROCEDURE — 83540 ASSAY OF IRON: CPT | Performed by: PHYSICIAN ASSISTANT

## 2023-01-31 PROCEDURE — 80053 COMPREHEN METABOLIC PANEL: CPT | Performed by: PHYSICIAN ASSISTANT

## 2023-01-31 PROCEDURE — 82607 VITAMIN B-12: CPT | Performed by: PHYSICIAN ASSISTANT

## 2023-01-31 RX ORDER — TRAZODONE HYDROCHLORIDE 100 MG/1
TABLET ORAL
Qty: 90 TABLET | Refills: 3 | Status: SHIPPED | OUTPATIENT
Start: 2023-01-31 | End: 2023-10-17

## 2023-01-31 RX ORDER — TRAMADOL HYDROCHLORIDE 50 MG/1
50 TABLET ORAL EVERY 6 HOURS PRN
Qty: 120 TABLET | Refills: 0 | Status: SHIPPED | OUTPATIENT
Start: 2023-01-31 | End: 2023-03-06

## 2023-01-31 RX ORDER — FLUOXETINE 40 MG/1
40 CAPSULE ORAL DAILY
Qty: 90 CAPSULE | Refills: 1 | Status: SHIPPED | OUTPATIENT
Start: 2023-01-31 | End: 2023-06-30

## 2023-01-31 RX ORDER — FAMOTIDINE 40 MG/1
TABLET, FILM COATED ORAL
Qty: 90 TABLET | Refills: 3 | Status: SHIPPED | OUTPATIENT
Start: 2023-01-31 | End: 2023-10-17

## 2023-01-31 RX ORDER — TERBINAFINE HYDROCHLORIDE 250 MG/1
250 TABLET ORAL DAILY
Qty: 90 TABLET | Refills: 0 | Status: SHIPPED | OUTPATIENT
Start: 2023-01-31 | End: 2023-05-01

## 2023-01-31 RX ORDER — PSEUDOEPHEDRINE HCL 120 MG/1
120 TABLET, FILM COATED, EXTENDED RELEASE ORAL EVERY 12 HOURS
Qty: 60 TABLET | Refills: 5 | Status: SHIPPED | OUTPATIENT
Start: 2023-01-31 | End: 2023-07-27

## 2023-01-31 ASSESSMENT — PATIENT HEALTH QUESTIONNAIRE - PHQ9
SUM OF ALL RESPONSES TO PHQ QUESTIONS 1-9: 9
SUM OF ALL RESPONSES TO PHQ QUESTIONS 1-9: 9
10. IF YOU CHECKED OFF ANY PROBLEMS, HOW DIFFICULT HAVE THESE PROBLEMS MADE IT FOR YOU TO DO YOUR WORK, TAKE CARE OF THINGS AT HOME, OR GET ALONG WITH OTHER PEOPLE: SOMEWHAT DIFFICULT

## 2023-01-31 ASSESSMENT — PAIN SCALES - GENERAL: PAINLEVEL: NO PAIN (0)

## 2023-01-31 NOTE — PROGRESS NOTES
Assessment & Plan     Major depressive disorder, recurrent episode, moderate (H)  Stable. Refilled.   - FLUoxetine (PROZAC) 40 MG capsule; Take 1 capsule (40 mg) by mouth daily    Chronic pain syndrome  Opioid agreement and drug screen ordered.   - Drug Abuse Screen Panel 13, Urine (Pain Care Package) - lab collect; Future  - Drug Abuse Screen Panel 13, Urine (Pain Care Package) - lab collect    Gastric ulcer without hemorrhage or perforation, unspecified chronicity  Stable refilled.   - famotidine (PEPCID) 40 MG tablet; TAKE 1 TABLET BY MOUTH EVERYDAY AT BEDTIME    Persistent insomnia  Stable refilled.   - traZODone (DESYREL) 100 MG tablet; TAKE 1 TABLET BY MOUTH EVERY DAY AT BEDTIME AS NEEDED FOR SLEEP    Chronic non-seasonal allergic rhinitis  Stable, blood pressure good refilled.   - pseudoePHEDrine (SUDAFED) 120 MG 12 hr tablet; Take 1 tablet (120 mg) by mouth every 12 hours    Myalgia  Check labs, monitor for worsening symptoms.   - CBC with Platelets & Differential; Future  - Vitamin B12; Future  - Iron & Iron Binding Capacity; Future  - Ferritin; Future  - CBC with Platelets & Differential  - Vitamin B12  - Iron & Iron Binding Capacity  - Ferritin    Onychomycosis  Will treat. Discussed risks of elevated LFT's. Will check today.   - Comprehensive metabolic panel; Future  - terbinafine (LAMISIL) 250 MG tablet; Take 1 tablet (250 mg) by mouth daily for 90 days  - Comprehensive metabolic panel    High risk medication use    - Comprehensive metabolic panel; Future  - Comprehensive metabolic panel    Traumatic arthritis of left wrist  Refilled. Q3 month visits, ok for virtual next and physical in person this summer. Meds keep her functional and working.   - traMADol (ULTRAM) 50 MG tablet; Take 1 tablet (50 mg) by mouth every 6 hours as needed for severe pain (7-10)                 No follow-ups on file.    Meg Bernardo PA-C  [facility] ROBBIE William is a 62 year old, presenting  "for the following health issues:  Follow Up      History of Present Illness       Reason for visit:  Meds    She eats 0-1 servings of fruits and vegetables daily.She consumes 3 sweetened beverage(s) daily.She exercises with enough effort to increase her heart rate 60 or more minutes per day.  She exercises with enough effort to increase her heart rate 5 days per week.   She is taking medications regularly.       Medication Followup     Taking Medication as prescribed: yes    Side Effects:  None    Medication Helping Symptoms:  yes    Gabapentin at the increased dose has been helping. No side effects.   Tramadol 1 TID, functional at work. Sharp pains in her wrist. No change.     Muscle aches everywhere when going to work.     Onychomycosis back, bilateral great toe nails lifting from the nail plate. Thickened. Had improved in the past.     Review of Systems         Objective    /79 (BP Location: Right arm, Patient Position: Sitting, Cuff Size: Adult Regular)   Pulse 87   Temp 97.7  F (36.5  C) (Oral)   Resp 16   Ht 1.626 m (5' 4\")   Wt 47.3 kg (104 lb 3.2 oz)   SpO2 100%   BMI 17.89 kg/m    Body mass index is 17.89 kg/m .  Physical Exam   GENERAL: healthy, alert and no distress  RESP: lungs clear to auscultation - no rales, rhonchi or wheezes  CV: regular rate and rhythm, normal S1 S2, no S3 or S4, no murmur, click or rub, no peripheral edema and peripheral pulses strong  PSYCH: mentation appears normal, affect normal/bright  foot exam: onychomycosis                    "

## 2023-01-31 NOTE — LETTER
Opioid / Opioid Plus Controlled Substance Agreement    This is an agreement between you and your provider about the safe and appropriate use of controlled substance/opioids prescribed by your care team. Controlled substances are medicines that can cause physical and mental dependence (abuse).    There are strict laws about having and using these medicines. We here at [facility] are committing to working with you in your efforts to get better. To support you in this work, we ll help you schedule regular office appointments for medicine refills. If we must cancel or change your appointment for any reason, we ll make sure you have enough medicine to last until your next appointment.     As a Provider, I will:    Listen carefully to your concerns and treat you with respect.     Recommend a treatment plan that I believe is in your best interest. This plan may involve therapies other than opioid pain medication.     Talk with you often about the possible benefits, and the risk of harm of any medicine that we prescribe for you.     Provide a plan on how to taper (discontinue or go off) using this medicine if the decision is made to stop its use.    As a Patient, I understand that opioid(s):     Are a controlled substance prescribed by my care team to help me function or work and manage my condition(s).     Are strong medicines and can cause serious side effects such as:    Drowsiness, which can seriously affect my driving ability    A lower breathing rate, enough to cause death    Harm to my thinking ability     Depression     Abuse of and addiction to this medicine    Need to be taken exactly as prescribed. Combining opioids with certain medicines or chemicals (such as illegal drugs, sedatives, sleeping pills, and benzodiazepines) can be dangerous or even fatal. If I stop opioids suddenly, I may have severe withdrawal symptoms.    Do not work for all types of pain nor for all patients. If they re not helpful, I may  be asked to stop them.        The risks, benefits and side effects of these medicine(s) were explained to me. I agree that:  1. I will take part in other treatments as advised by my care team. This may be psychiatry or counseling, physical therapy, behavioral therapy, group treatment or a referral to a specialist.     2. I will keep all my appointments. I understand that this is part of the monitoring of opioids. My care team may require an office visit for EVERY opioid/controlled substance refill. If I miss appointments or don t follow instructions, my care team may stop my medicine.    3. I will take my medicines as prescribed. I will not change the dose or schedule unless my care team tells me to. There will be no refills if I run out early.     4. I may be asked to come to the clinic and complete a urine drug test or complete a pill count at any time. If I don t give a urine sample or participate in a pill count, the care team may stop my medicine.    5. I will only receive prescriptions from this clinic for chronic pain. If I am treated by another provider for acute pain issues, I will tell them that I am taking opioid pain medication for chronic pain and that I have a treatment agreement with this provider. I will inform my [facility] care team within one business day if I am given a prescription for any pain medication by another healthcare provider. My [facility] care team can contact other providers and pharmacists about my use of any medicines.    6. It is up to me to make sure that I don t run out of my medicines on weekends or holidays. If my care team is willing to refill my opioid prescription without a visit, I must request refills only during office hours. Refills may take up to 3 business days to process. I will use one pharmacy to fill all my opioid and other controlled substance prescriptions. I will notify the clinic about any changes to my insurance or medication  availability.    7. I am responsible for my prescriptions. If the medicine/prescription is lost, stolen or destroyed, it will not be replaced. I also agree not to share controlled substance medicines with anyone.    8. I am aware I should not use any illegal or recreational drugs. I agree not to drink alcohol unless my care team says I can.       9. If I enroll in the [place] Medical Cannabis program, I will tell my care team prior to my next refill.     10. I will tell my care team right away if I become pregnant, have a new medical problem treated outside of my regular clinic, or have a change in my medications.    11. I understand that this medicine can affect my thinking, judgment and reaction time. Alcohol and drugs affect the brain and body, which can affect the safety of my driving. Being under the influence of alcohol or drugs can affect my decision-making, behaviors, personal safety, and the safety of others. Driving while impaired (DWI) can occur if a person is driving, operating, or in physical control of a car, motorcycle, boat, snowmobile, ATV, motorbike, off-road vehicle, or any other motor vehicle ([place] Statute 169A.20). I understand the risk if I choose to drive or operate any vehicle or machinery.    I understand that if I do not follow any of the conditions above, my prescriptions or treatment may be stopped or changed.          Opioids  What You Need to Know    What are opioids?   Opioids are pain medicines that must be prescribed by a doctor. They are also known as narcotics.     Examples are:   1. morphine (MS Contin, Ainsley)  2. oxycodone (Oxycontin)  3. oxycodone and acetaminophen (Percocet)  4. hydrocodone and acetaminophen (Vicodin, Norco)   5. fentanyl patch (Duragesic)   6. hydromorphone (Dilaudid)   7. methadone  8. codeine (Tylenol #3)     What do opioids do well?   Opioids are best for severe short-term pain such as after a surgery or injury. They may work well for cancer pain. They may  help some people with long-lasting (chronic) pain.     What do opioids NOT do well?   Opioids never get rid of pain entirely, and they don t work well for most patients with chronic pain. Opioids don t reduce swelling, one of the causes of pain.                                    Other ways to manage chronic pain and improve function include:       Treat the health problem that may be causing pain    Anti-inflammation medicines, which reduce swelling and tenderness, such as ibuprofen (Advil, Motrin) or naproxen (Aleve)    Acetaminophen (Tylenol)    Antidepressants and anti-seizure medicines, especially for nerve pain    Topical treatments such as patches or creams    Injections or nerve blocks    Chiropractic or osteopathic treatment    Acupuncture, massage, deep breathing, meditation, visual imagery, aromatherapy    Use heat or ice at the pain site    Physical therapy     Exercise    Stop smoking    Take part in therapy       Risks and side effects     Talk to your doctor before you start or decide to keep taking opioids. Possible side effects include:      Lowering your breathing rate enough to cause death    Overdose, including death, especially if taking higher than prescribed doses    Worse depression symptoms; less pleasure in things you usually enjoy    Feeling tired or sluggish    Slower thoughts or cloudy thinking    Being more sensitive to pain over time; pain is harder to control    Trouble sleeping or restless sleep    Changes in hormone levels (for example, less testosterone)    Changes in sex drive or ability to have sex    Constipation    Unsafe driving    Itching and sweating    Dizziness    Nausea, throwing up and dry mouth    What else should I know about opioids?    Opioids may lead to dependence, tolerance, or addiction.      Dependence means that if you stop or reduce the medicine too quickly, you will have withdrawal symptoms. These include loose poop (diarrhea), jitters, flu-like symptoms,  nervousness and tremors. Dependence is not the same as addiction.                       Tolerance means needing higher doses over time to get the same effect. This may increase the chance of serious side effects.      Addiction is when people improperly use a substance that harms their body, their mind or their relations with others. Use of opiates can cause a relapse of addiction if you have a history of drug or alcohol abuse.      People who have used opioids for a long time may have a lower quality of life, worse depression, higher levels of pain and more visits to doctors.    You can overdose on opioids. Take these steps to lower your risk of overdose:    1. Recognize the signs:  Signs of overdose include decrease or loss of consciousness (blackout), slowed breathing, trouble waking up and blue lips. If someone is worried about overdose, they should call 911.    2. Talk to your doctor about Narcan (naloxone).   If you are at risk for overdose, you may be given a prescription for Narcan. This medicine very quickly reverses the effects of opioids.   If you overdose, a friend or family member can give you Narcan while waiting for the ambulance. They need to know the signs of overdose and how to give Narcan.     3. Don't use alcohol or street drugs.   Taking them with opioids can cause death.    4. Do not take any of these medicines unless your doctor says it s OK. Taking these with opioids can cause death:    Benzodiazepines, such as lorazepam (Ativan), alprazolam (Xanax) or diazepam (Valium)    Muscle relaxers, such as cyclobenzaprine (Flexeril)    Sleeping pills like zolpidem (Ambien)     Other opioids      How to keep you and other people safe while taking opioids:    1. Never share your opioids with others.  Opioid medicines are regulated by the Drug Enforcement Agency ([place]). Selling or sharing medications is a criminal act.    2. Be sure to store opioids in a secure place, locked up if possible. Young children  can easily swallow them and overdose.    3. When you are traveling with your medicines, keep them in the original bottles. If you use a pill box, be sure you also carry a copy of your medicine list from your clinic or pharmacy.    4. Safe disposal of opioids    Most pharmacies have places to get rid of medicine, called disposal kiosks. Medicine disposal options are also available in every [facility]. Search your county and  medication disposal  to find more options. You can find more details at:  [email or web address]     I agree that my provider, clinic care team, and pharmacy may work with any city, state or federal law enforcement agency that investigates the misuse, sale, or other diversion of my controlled medicine. I will allow my provider to discuss my care with, or share a copy of, this agreement with any other treating provider, pharmacy or emergency room where I receive care.    I have read this agreement and have asked questions about anything I did not understand.    _______________________________________________________  Patient Signature - Nataliia Flanagan _____________________                   Date     _______________________________________________________  Provider Signature - Meg Bernardo PA-C   _____________________                   Date     _______________________________________________________  Witness Signature (required if provider not present while patient signing)   _____________________                   Date

## 2023-02-01 LAB
ALBUMIN SERPL-MCNC: 3.4 G/DL (ref 3.4–5)
ALP SERPL-CCNC: 88 U/L (ref 40–150)
ALT SERPL W P-5'-P-CCNC: 40 U/L (ref 0–50)
ANION GAP SERPL CALCULATED.3IONS-SCNC: 5 MMOL/L (ref 3–14)
AST SERPL W P-5'-P-CCNC: 27 U/L (ref 0–45)
BILIRUB SERPL-MCNC: 0.3 MG/DL (ref 0.2–1.3)
BUN SERPL-MCNC: 15 MG/DL (ref 7–30)
CALCIUM SERPL-MCNC: 8.8 MG/DL (ref 8.5–10.1)
CHLORIDE BLD-SCNC: 103 MMOL/L (ref 94–109)
CO2 SERPL-SCNC: 30 MMOL/L (ref 20–32)
CREAT SERPL-MCNC: 0.68 MG/DL (ref 0.52–1.04)
FERRITIN SERPL-MCNC: 9 NG/ML (ref 8–252)
GFR SERPL CREATININE-BSD FRML MDRD: >90 ML/MIN/1.73M2
GLUCOSE BLD-MCNC: 106 MG/DL (ref 70–99)
IRON SATN MFR SERPL: 17 % (ref 15–46)
IRON SERPL-MCNC: 70 UG/DL (ref 35–180)
POTASSIUM BLD-SCNC: 4.7 MMOL/L (ref 3.4–5.3)
PROT SERPL-MCNC: 6.5 G/DL (ref 6.8–8.8)
SODIUM SERPL-SCNC: 138 MMOL/L (ref 133–144)
TIBC SERPL-MCNC: 407 UG/DL (ref 240–430)

## 2023-02-01 NOTE — RESULT ENCOUNTER NOTE
Nataliia,     Your blood work is stable without any significant findings to explain the muscle aches.   However, you urine drug screen was positive for benzodiazepines. These are anti-anxiety medications that are given via prescription. Can you tell me a little more about what you are taking? I do not have any of these medications on your list and they do interact with your Tramadol.   Meg Bernardo PA-C

## 2023-02-25 DIAGNOSIS — M19.132 POST-TRAUMATIC OSTEOARTHRITIS OF LEFT WRIST: ICD-10-CM

## 2023-02-25 DIAGNOSIS — M12.532 TRAUMATIC ARTHRITIS OF LEFT WRIST: ICD-10-CM

## 2023-02-27 RX ORDER — GABAPENTIN 600 MG/1
TABLET ORAL
Qty: 270 TABLET | Refills: 0 | Status: SHIPPED | OUTPATIENT
Start: 2023-02-27 | End: 2023-06-05

## 2023-03-03 DIAGNOSIS — M12.532 TRAUMATIC ARTHRITIS OF LEFT WRIST: ICD-10-CM

## 2023-03-06 RX ORDER — TRAMADOL HYDROCHLORIDE 50 MG/1
TABLET ORAL
Qty: 120 TABLET | Refills: 0 | Status: SHIPPED | OUTPATIENT
Start: 2023-03-06 | End: 2023-04-06

## 2023-04-06 DIAGNOSIS — M12.532 TRAUMATIC ARTHRITIS OF LEFT WRIST: ICD-10-CM

## 2023-04-06 RX ORDER — TRAMADOL HYDROCHLORIDE 50 MG/1
TABLET ORAL
Qty: 120 TABLET | Refills: 0 | Status: SHIPPED | OUTPATIENT
Start: 2023-04-06 | End: 2023-05-04

## 2023-05-04 DIAGNOSIS — M12.532 TRAUMATIC ARTHRITIS OF LEFT WRIST: ICD-10-CM

## 2023-05-04 RX ORDER — TRAMADOL HYDROCHLORIDE 50 MG/1
TABLET ORAL
Qty: 120 TABLET | Refills: 0 | Status: SHIPPED | OUTPATIENT
Start: 2023-05-04 | End: 2023-06-06

## 2023-06-02 ENCOUNTER — HEALTH MAINTENANCE LETTER (OUTPATIENT)
Age: 63
End: 2023-06-02

## 2023-06-05 DIAGNOSIS — M19.132 POST-TRAUMATIC OSTEOARTHRITIS OF LEFT WRIST: ICD-10-CM

## 2023-06-05 DIAGNOSIS — M12.532 TRAUMATIC ARTHRITIS OF LEFT WRIST: ICD-10-CM

## 2023-06-05 RX ORDER — GABAPENTIN 600 MG/1
TABLET ORAL
Qty: 270 TABLET | Refills: 0 | Status: SHIPPED | OUTPATIENT
Start: 2023-06-05 | End: 2023-09-18

## 2023-06-06 DIAGNOSIS — M12.532 TRAUMATIC ARTHRITIS OF LEFT WRIST: ICD-10-CM

## 2023-06-06 RX ORDER — TRAMADOL HYDROCHLORIDE 50 MG/1
TABLET ORAL
Qty: 60 TABLET | Refills: 0 | Status: SHIPPED | OUTPATIENT
Start: 2023-06-06 | End: 2023-06-29

## 2023-06-29 ENCOUNTER — TELEPHONE (OUTPATIENT)
Dept: FAMILY MEDICINE | Facility: CLINIC | Age: 63
End: 2023-06-29
Payer: COMMERCIAL

## 2023-06-29 DIAGNOSIS — M12.532 TRAUMATIC ARTHRITIS OF LEFT WRIST: ICD-10-CM

## 2023-06-29 RX ORDER — TRAMADOL HYDROCHLORIDE 50 MG/1
TABLET ORAL
Qty: 60 TABLET | Refills: 0 | Status: SHIPPED | OUTPATIENT
Start: 2023-07-06 | End: 2023-06-29

## 2023-06-29 RX ORDER — TRAMADOL HYDROCHLORIDE 50 MG/1
TABLET ORAL
Qty: 120 TABLET | Refills: 0 | Status: SHIPPED | OUTPATIENT
Start: 2023-06-29 | End: 2023-08-17

## 2023-06-29 NOTE — TELEPHONE ENCOUNTER
Patient requesting tramadol refill    Normally receives 120 tablets; previously received 60 on 6/6/23     Ok for refill?    Chandrika Chaudhari RN  [facility]

## 2023-06-29 NOTE — TELEPHONE ENCOUNTER
Yes, I missed that her las prescription was only for 15 days. I have sent a new 120 dose to the pharmacy and she will need to go a month from today.   Meg Bernardo PA-C

## 2023-06-29 NOTE — TELEPHONE ENCOUNTER
Spoke to patient on phone. Patient stated they only received half a prescription last refill. Transferred to triage.       Lucia Mendes -    [facility]

## 2023-06-30 ENCOUNTER — VIRTUAL VISIT (OUTPATIENT)
Dept: FAMILY MEDICINE | Facility: CLINIC | Age: 63
End: 2023-06-30
Payer: COMMERCIAL

## 2023-06-30 DIAGNOSIS — F33.1 MAJOR DEPRESSIVE DISORDER, RECURRENT EPISODE, MODERATE (H): ICD-10-CM

## 2023-06-30 DIAGNOSIS — G89.4 CHRONIC PAIN SYNDROME: Primary | ICD-10-CM

## 2023-06-30 PROCEDURE — 99214 OFFICE O/P EST MOD 30 MIN: CPT | Mod: 95 | Performed by: PHYSICIAN ASSISTANT

## 2023-06-30 PROCEDURE — 96127 BRIEF EMOTIONAL/BEHAV ASSMT: CPT | Performed by: PHYSICIAN ASSISTANT

## 2023-06-30 ASSESSMENT — PATIENT HEALTH QUESTIONNAIRE - PHQ9
10. IF YOU CHECKED OFF ANY PROBLEMS, HOW DIFFICULT HAVE THESE PROBLEMS MADE IT FOR YOU TO DO YOUR WORK, TAKE CARE OF THINGS AT HOME, OR GET ALONG WITH OTHER PEOPLE: SOMEWHAT DIFFICULT
SUM OF ALL RESPONSES TO PHQ QUESTIONS 1-9: 9
SUM OF ALL RESPONSES TO PHQ QUESTIONS 1-9: 9
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
## 2023-07-06 ASSESSMENT — PATIENT HEALTH QUESTIONNAIRE - PHQ9
SUM OF ALL RESPONSES TO PHQ QUESTIONS 1-9: 7
10. IF YOU CHECKED OFF ANY PROBLEMS, HOW DIFFICULT HAVE THESE PROBLEMS MADE IT FOR YOU TO DO YOUR WORK, TAKE CARE OF THINGS AT HOME, OR GET ALONG WITH OTHER PEOPLE: SOMEWHAT DIFFICULT
SUM OF ALL RESPONSES TO PHQ QUESTIONS 1-9: 7

## 2023-07-07 ENCOUNTER — OFFICE VISIT (OUTPATIENT)
Dept: FAMILY MEDICINE | Facility: CLINIC | Age: 63
End: 2023-07-07
Payer: COMMERCIAL

## 2023-07-07 VITALS
HEART RATE: 75 BPM | HEIGHT: 64 IN | OXYGEN SATURATION: 100 % | BODY MASS INDEX: 18.16 KG/M2 | TEMPERATURE: 97.5 F | RESPIRATION RATE: 16 BRPM | DIASTOLIC BLOOD PRESSURE: 80 MMHG | SYSTOLIC BLOOD PRESSURE: 112 MMHG | WEIGHT: 106.4 LBS

## 2023-07-07 DIAGNOSIS — H35.373 MACULAR PUCKERING OF RETINA, BILATERAL: ICD-10-CM

## 2023-07-07 DIAGNOSIS — G89.4 CHRONIC PAIN SYNDROME: ICD-10-CM

## 2023-07-07 DIAGNOSIS — F33.1 MAJOR DEPRESSIVE DISORDER, RECURRENT EPISODE, MODERATE (H): ICD-10-CM

## 2023-07-07 DIAGNOSIS — Z78.9 NON-SMOKER: ICD-10-CM

## 2023-07-07 DIAGNOSIS — K21.9 GASTROESOPHAGEAL REFLUX DISEASE WITHOUT ESOPHAGITIS: ICD-10-CM

## 2023-07-07 DIAGNOSIS — Z01.818 PREOP GENERAL PHYSICAL EXAM: Primary | ICD-10-CM

## 2023-07-07 PROCEDURE — 99214 OFFICE O/P EST MOD 30 MIN: CPT | Performed by: STUDENT IN AN ORGANIZED HEALTH CARE EDUCATION/TRAINING PROGRAM

## 2023-07-07 RX ORDER — TOBRAMYCIN 3 MG/ML
SOLUTION/ DROPS OPHTHALMIC
COMMUNITY
Start: 2023-06-23 | End: 2024-04-16

## 2023-07-07 RX ORDER — PREDNISOLONE ACETATE 10 MG/ML
SUSPENSION/ DROPS OPHTHALMIC
COMMUNITY
Start: 2023-06-22 | End: 2024-04-16

## 2023-07-07 NOTE — PATIENT INSTRUCTIONS
For informational purposes only. Not to replace the advice of your health care provider. Copyright   2003,  [facility]. All rights reserved. Clinically reviewed by Chanelle Sanches MD. [employer] [id number] - REV .  Preparing for Your Surgery  Getting started  A nurse will call you to review your health history and instructions. They will give you an arrival time based on your scheduled surgery time. Please be ready to share:  Your doctor's clinic name and phone number  Your medical, surgical, and anesthesia history  A list of allergies and sensitivities  A list of medicines, including herbal treatments and over-the-counter drugs  Whether the patient has a legal guardian (ask how to send us the papers in advance)  Please tell us if you're pregnant--or if there's any chance you might be pregnant. Some surgeries may injure a fetus (unborn baby), so they require a pregnancy test. Surgeries that are safe for a fetus don't always need a test, and you can choose whether to have one.   If you have a child who's having surgery, please ask for a copy of Preparing for Your Child's Surgery.    Preparing for surgery  Within 10 to 30 days of surgery: Have a pre-op exam (sometimes called an H&P, or History and Physical). This can be done at a clinic or pre-operative center.  If you're having a , you may not need this exam. Talk to your care team.  At your pre-op exam, talk to your care team about all medicines you take. If you need to stop any medicines before surgery, ask when to start taking them again.  We do this for your safety. Many medicines can make you bleed too much during surgery. Some change how well surgery (anesthesia) drugs work.  Call your insurance company to let them know you're having surgery. (If you don't have insurance, call [phone].)  Call your clinic if there's any change in your health. This includes signs of a cold or flu (sore throat, runny nose, cough, rash, fever). It  also includes a scrape or scratch near the surgery site.  If you have questions on the day of surgery, call your hospital or surgery center.  Eating and drinking guidelines  For your safety: Unless your surgeon tells you otherwise, follow the guidelines below.  Eat and drink as usual until 8 hours before you arrive for surgery. After that, no food or milk.  Drink clear liquids until 2 hours before you arrive. These are liquids you can see through, like water, Gatorade, and Propel Water. They also include plain black coffee and tea (no cream or milk), candy, and breath mints. You can spit out gum when you arrive.  If you drink alcohol: Stop drinking it the night before surgery.  If your care team tells you to take medicine on the morning of surgery, it's okay to take it with a sip of water.  Preventing infection  Shower or bathe the night before and morning of your surgery. Follow the instructions your clinic gave you. (If no instructions, use regular soap.)  Don't shave or clip hair near your surgery site. We'll remove the hair if needed.  Don't smoke or vape the morning of surgery. You may chew nicotine gum up to 2 hours before surgery. A nicotine patch is okay.  Note: Some surgeries require you to completely quit smoking and nicotine. Check with your surgeon.  Your care team will make every effort to keep you safe from infection. We will:  Clean our hands often with soap and water (or an alcohol-based hand rub).  Clean the skin at your surgery site with a special soap that kills germs.  Give you a special gown to keep you warm. (Cold raises the risk of infection.)  Wear special hair covers, masks, gowns and gloves during surgery.  Give antibiotic medicine, if prescribed. Not all surgeries need antibiotics.  What to bring on the day of surgery  Photo ID and insurance card  Copy of your health care directive, if you have one  Glasses and hearing aids (bring cases)  You can't wear contacts during surgery  Inhaler and  eye drops, if you use them (tell us about these when you arrive)  CPAP machine or breathing device, if you use them  A few personal items, if spending the night  If you have . . .  A pacemaker, ICD (cardiac defibrillator) or other implant: Bring the ID card.  An implanted stimulator: Bring the remote control.  A legal guardian: Bring a copy of the certified (court-stamped) guardianship papers.  Please remove any jewelry, including body piercings. Leave jewelry and other valuables at home.  If you're going home the day of surgery  You must have a responsible adult drive you home. They should stay with you overnight as well.  If you don't have someone to stay with you, and you aren't safe to go home alone, we may keep you overnight. Insurance often won't pay for this.  After surgery  If it's hard to control your pain or you need more pain medicine, please call your surgeon's office.  Questions?   If you have any questions for your care team, list them here: _________________________________________________________________________________________________________________________________________________________________________ ____________________________________ ____________________________________ ____________________________________    How to Take Your Medication Before Surgery  - Take all of your medications before surgery as usual

## 2023-07-07 NOTE — PROGRESS NOTES
[facility]  [address]  [place] [address]  Phone: [phone]  Primary Provider: Meg Bernardo  Pre-op Performing Provider: CURT BEASLEY      PREOPERATIVE EVALUATION:  Today's date: 7/7/2023    Nataliia Flanagan is a 62 year old female who presents for a preoperative evaluation.      6/30/2023    11:05 AM   Additional Questions   Roomed by dee garcia     Surgical Information:  Surgery/Procedure: Macular Procedure   Surgery Location: [facility]   Surgeon: Dr. Sagastume   Surgery Date: 7/10/23  Time of Surgery: 9:40 AM   Where patient plans to recover: At home with family  Fax number for surgical facility: [phone]    Assessment & Plan     The proposed surgical procedure is considered LOW risk.    1. Preop general physical exam      2. Major depressive disorder, recurrent episode, moderate (H)  Stable on Abilify and selective serotonin reuptake inhibitor.  Managed by PCP  3. Chronic pain syndrome  stable  Managed by PCP  4. Macular puckering of retina, bilateral      5. Gastroesophageal reflux disease without esophagitis    Controlled with lifestyle modification and pepcid  6. Non-smoker                - No identified additional risk factors other than previously addressed    Antiplatelet or Anticoagulation Medication Instructions:   - Patient is on no antiplatelet or anticoagulation medications.    Additional Medication Instructions:  Patient is to take all scheduled medications on the day of surgery    RECOMMENDATION:  APPROVAL GIVEN to proceed with proposed procedure, without further diagnostic evaluation.    [id number]}    Subjective       HPI related to upcoming procedure:  Macular puckering of the retina        6/30/2023     9:32 AM   Preop Questions   1. Have you ever had a heart attack or stroke? No   2. Have you ever had surgery on your heart or blood vessels, such as a stent placement, a coronary artery bypass, or surgery on an artery in your head,  neck, heart, or legs? No   3. Do you have chest pain with activity? No   4. Do you have a history of  heart failure? No   5. Do you currently have a cold, bronchitis or symptoms of other infection? No   6. Do you have a cough, shortness of breath, or wheezing? No   7. Do you or anyone in your family have previous history of blood clots? No   8. Do you or does anyone in your family have a serious bleeding problem such as prolonged bleeding following surgeries or cuts? No   9. Have you ever had problems with anemia or been told to take iron pills? YES - 20 yrs ago   10. Have you had any abnormal blood loss such as black, tarry or bloody stools, or abnormal vaginal bleeding? No   11. Have you ever had a blood transfusion? YES - 20 yrs ago   11a. Have you ever had a transfusion reaction? No   12. Are you willing to have a blood transfusion if it is medically needed before, during, or after your surgery? Yes   13. Have you or any of your relatives ever had problems with anesthesia? No   14. Do you have sleep apnea, excessive snoring or daytime drowsiness? No   15. Do you have any artifical heart valves or other implanted medical devices like a pacemaker, defibrillator, or continuous glucose monitor? No   16. Do you have artificial joints? No   17. Are you allergic to latex? No         Preoperative Review of :   reviewed - controlled substances reflected in medication list.      Status of Chronic Conditions:  See problem list for active medical problems.  Problems all longstanding and stable, except as noted/documented.  See ROS for pertinent symptoms related to these conditions.      Review of Systems  Constitutional, neuro, ENT, endocrine, pulmonary, cardiac, gastrointestinal, genitourinary, musculoskeletal, integument and psychiatric systems are negative, except as otherwise noted.    Patient Active Problem List    Diagnosis Date Noted     Chronic pain of left knee 01/14/2021     Priority: Medium     Chronic pain  syndrome 07/15/2019     Priority: Medium     Patient is followed by Meg Bernardo PA-C for ongoing prescription of pain medication.  All refills should only be approved by this provider, or covering partner.    Medication(s): Tramadol 50mg.   Maximum quantity per month: 120  Clinic visit frequency required: Q 3 months      Controlled substance agreement:  Encounter-Level CSA:    There are no encounter-level csa.     Patient-Level CSA:    Controlled Substance Agreement - Opioid - Scan on 7/15/2019  1:42 PM       Pain Clinic evaluation in the past: Yes       Date/Location:  [facility] 2020    DIRE Total Score(s):  No flowsheet data found.    Last [facility] website verification:  done on 1/11/21   [email or web address]         Traumatic arthritis of left wrist 09/18/2017     Priority: Medium     Major depressive disorder, recurrent episode, moderate (H) 11/19/2015     Priority: Medium     Gastroesophageal reflux disease without esophagitis 09/17/2015     Priority: Medium     Joint inflammation of wrist - left 03/24/2015     Priority: Medium     Distal radius fracture 02/24/2015     Priority: Medium     Iron deficiency anemia 06/27/2014     Priority: Medium     Problem list name updated by automated process. Provider to review       Chronic rhinitis 07/03/2012     Priority: Medium     Malnutrition (H) 07/03/2012     Priority: Medium      Past Medical History:   Diagnosis Date     Depression     followed by PSYCH     Migraines      Traumatic arthritis of left wrist      Past Surgical History:   Procedure Laterality Date     BIOPSY  2017     COSMETIC SURGERY      breast implants     ENDOSCOPY  12-9-14     ENT SURGERY      tonsilectomy     GENITOURINARY SURGERY      tubes tied     Current Outpatient Medications   Medication Sig Dispense Refill     ARIPiprazole (ABILIFY) 5 MG tablet TAKE 1 TABLET BY MOUTH EVERY DAY 90 tablet 1     Aspirin-Acetaminophen-Caffeine (EXCEDRIN PO) Take 2 tablets by mouth as needed        "Dermatological Products, Misc. (NUVAIL) SOLN Externally apply topically At Bedtime 15 mL 5     diclofenac (VOLTAREN) 1 % topical gel Apply 2 g topically as needed       famotidine (PEPCID) 40 MG tablet TAKE 1 TABLET BY MOUTH EVERYDAY AT BEDTIME 90 tablet 3     FLUoxetine (PROZAC) 20 MG capsule Take 3 capsules (60 mg) by mouth daily 270 capsule 0     gabapentin (NEURONTIN) 600 MG tablet TAKE 1 TABLET BY MOUTH THREE TIMES A  tablet 0     pseudoePHEDrine (SUDAFED) 120 MG 12 hr tablet Take 1 tablet (120 mg) by mouth every 12 hours 60 tablet 5     traMADol (ULTRAM) 50 MG tablet TAKE 1 TABLET (50 MG) BY MOUTH EVERY 6 HOURS AS NEEDED FOR SEVERE PAIN (7-10) 120 tablet 0     traZODone (DESYREL) 100 MG tablet TAKE 1 TABLET BY MOUTH EVERY DAY AT BEDTIME AS NEEDED FOR SLEEP 90 tablet 3       No Known Allergies     Social History     Tobacco Use     Smoking status: Never     Smokeless tobacco: Never   Substance Use Topics     Alcohol use: No     Family History   Problem Relation Age of Onset     Diabetes Father              Hypertension Father              Hyperlipidemia Father              Other Cancer Father              Thyroid Disease Sister      Thyroid Disease Sister      Other Cancer Sister      History   Drug Use No         Objective     /80 (BP Location: Left arm, Patient Position: Sitting, Cuff Size: Adult Regular)   Pulse 75   Temp 97.5  F (36.4  C) (Temporal)   Resp 16   Ht 1.626 m (5' 4\")   Wt 48.3 kg (106 lb 6.4 oz)   SpO2 100%   BMI 18.26 kg/m      Physical Exam    GENERAL APPEARANCE: healthy, alert and no distress     EYES: EOMI, PERRL     HENT: ear canals and TM's normal and nose and mouth without ulcers or lesions     RESP: lungs clear to auscultation - no rales, rhonchi or wheezes     CV: regular rates and rhythm, normal S1 S2, no S3 or S4 and no murmur, click or rub     ABDOMEN:  soft, nontender, no HSM or masses and bowel sounds normal     MS: extremities " normal- no gross deformities noted, no evidence of inflammation in joints, FROM in all extremities.     SKIN: no suspicious lesions or rashes     NEURO: Normal strength and tone, sensory exam grossly normal, mentation intact and speech normal     PSYCH: mentation appears normal. and affect normal/bright      Recent Labs   Lab Test 01/31/23  1250 03/01/22  1200   HGB 11.7 12.1    281    137   POTASSIUM 4.7 4.7   CR 0.68 0.84        Diagnostics:  No labs were ordered during this visit.   No EKG required for low risk surgery (cataract, skin procedure, breast biopsy, etc).    Revised Cardiac Risk Index (RCRI):  The patient has the following serious cardiovascular risks for perioperative complications:   - No serious cardiac risks = 0 points     RCRI Interpretation: 0 points: Class I (very low risk - 0.4% complication rate)           Signed Electronically by: Vinita Higgins MD  Copy of this evaluation report is provided to requesting physician.      Answers for HPI/ROS submitted by the patient on 7/6/2023  If you checked off any problems, how difficult have these problems made it for you to do your work, take care of things at home, or get along with other people?: Somewhat difficult  PHQ9 TOTAL SCORE: 7

## 2023-07-27 DIAGNOSIS — J30.89 CHRONIC NON-SEASONAL ALLERGIC RHINITIS: ICD-10-CM

## 2023-07-27 RX ORDER — PSEUDOEPHEDRINE HCL 120 MG/1
120 TABLET, FILM COATED, EXTENDED RELEASE ORAL EVERY 12 HOURS
Qty: 60 TABLET | Refills: 4 | Status: SHIPPED | OUTPATIENT
Start: 2023-07-27 | End: 2023-10-17

## 2023-08-05 DIAGNOSIS — F33.1 MAJOR DEPRESSIVE DISORDER, RECURRENT EPISODE, MODERATE (H): ICD-10-CM

## 2023-08-07 RX ORDER — ARIPIPRAZOLE 5 MG/1
TABLET ORAL
Qty: 90 TABLET | Refills: 1 | Status: SHIPPED | OUTPATIENT
Start: 2023-08-07 | End: 2024-02-19

## 2023-09-18 DIAGNOSIS — M12.532 TRAUMATIC ARTHRITIS OF LEFT WRIST: ICD-10-CM

## 2023-09-18 DIAGNOSIS — M19.132 POST-TRAUMATIC OSTEOARTHRITIS OF LEFT WRIST: ICD-10-CM

## 2023-09-18 RX ORDER — GABAPENTIN 600 MG/1
TABLET ORAL
Qty: 270 TABLET | Refills: 0 | Status: SHIPPED | OUTPATIENT
Start: 2023-09-18 | End: 2023-10-17

## 2023-09-18 RX ORDER — TRAMADOL HYDROCHLORIDE 50 MG/1
TABLET ORAL
Qty: 120 TABLET | Refills: 0 | Status: SHIPPED | OUTPATIENT
Start: 2023-09-18 | End: 2023-10-17

## 2023-09-26 DIAGNOSIS — F33.1 MAJOR DEPRESSIVE DISORDER, RECURRENT EPISODE, MODERATE (H): ICD-10-CM

## 2023-10-14 ASSESSMENT — ENCOUNTER SYMPTOMS
FEVER: 0
NERVOUS/ANXIOUS: 0
BREAST MASS: 0
JOINT SWELLING: 0
SORE THROAT: 0
FREQUENCY: 0
COUGH: 0
EYE PAIN: 0
DIARRHEA: 0
DIZZINESS: 0
HEMATURIA: 0
NAUSEA: 0
CHILLS: 0
PALPITATIONS: 0
ARTHRALGIAS: 0
HEADACHES: 0
CONSTIPATION: 0
SHORTNESS OF BREATH: 0
HEARTBURN: 0
WEAKNESS: 0
MYALGIAS: 0
PARESTHESIAS: 0
DYSURIA: 0
HEMATOCHEZIA: 0
ABDOMINAL PAIN: 0

## 2023-10-17 ENCOUNTER — OFFICE VISIT (OUTPATIENT)
Dept: FAMILY MEDICINE | Facility: CLINIC | Age: 63
End: 2023-10-17
Attending: PHYSICIAN ASSISTANT
Payer: COMMERCIAL

## 2023-10-17 VITALS
OXYGEN SATURATION: 98 % | HEIGHT: 64 IN | TEMPERATURE: 98.5 F | HEART RATE: 95 BPM | SYSTOLIC BLOOD PRESSURE: 133 MMHG | BODY MASS INDEX: 19.09 KG/M2 | DIASTOLIC BLOOD PRESSURE: 88 MMHG | WEIGHT: 111.8 LBS | RESPIRATION RATE: 18 BRPM

## 2023-10-17 DIAGNOSIS — Z12.11 SCREENING FOR COLON CANCER: ICD-10-CM

## 2023-10-17 DIAGNOSIS — F33.1 MAJOR DEPRESSIVE DISORDER, RECURRENT EPISODE, MODERATE (H): ICD-10-CM

## 2023-10-17 DIAGNOSIS — J30.89 CHRONIC NON-SEASONAL ALLERGIC RHINITIS: ICD-10-CM

## 2023-10-17 DIAGNOSIS — M19.132 POST-TRAUMATIC OSTEOARTHRITIS OF LEFT WRIST: ICD-10-CM

## 2023-10-17 DIAGNOSIS — Z12.4 CERVICAL CANCER SCREENING: ICD-10-CM

## 2023-10-17 DIAGNOSIS — G47.00 PERSISTENT INSOMNIA: ICD-10-CM

## 2023-10-17 DIAGNOSIS — M12.532 TRAUMATIC ARTHRITIS OF LEFT WRIST: ICD-10-CM

## 2023-10-17 DIAGNOSIS — Z13.220 SCREENING FOR HYPERLIPIDEMIA: ICD-10-CM

## 2023-10-17 DIAGNOSIS — Z00.00 ROUTINE GENERAL MEDICAL EXAMINATION AT A HEALTH CARE FACILITY: Primary | ICD-10-CM

## 2023-10-17 DIAGNOSIS — K25.9 GASTRIC ULCER WITHOUT HEMORRHAGE OR PERFORATION, UNSPECIFIED CHRONICITY: ICD-10-CM

## 2023-10-17 LAB — CREAT UR-MCNC: 82 MG/DL

## 2023-10-17 PROCEDURE — 90471 IMMUNIZATION ADMIN: CPT | Performed by: PHYSICIAN ASSISTANT

## 2023-10-17 PROCEDURE — 99214 OFFICE O/P EST MOD 30 MIN: CPT | Mod: 25 | Performed by: PHYSICIAN ASSISTANT

## 2023-10-17 PROCEDURE — 36415 COLL VENOUS BLD VENIPUNCTURE: CPT | Performed by: PHYSICIAN ASSISTANT

## 2023-10-17 PROCEDURE — 80061 LIPID PANEL: CPT | Performed by: PHYSICIAN ASSISTANT

## 2023-10-17 PROCEDURE — G0480 DRUG TEST DEF 1-7 CLASSES: HCPCS | Performed by: PHYSICIAN ASSISTANT

## 2023-10-17 PROCEDURE — 80048 BASIC METABOLIC PNL TOTAL CA: CPT | Performed by: PHYSICIAN ASSISTANT

## 2023-10-17 PROCEDURE — 99396 PREV VISIT EST AGE 40-64: CPT | Mod: 25 | Performed by: PHYSICIAN ASSISTANT

## 2023-10-17 PROCEDURE — 90682 RIV4 VACC RECOMBINANT DNA IM: CPT | Performed by: PHYSICIAN ASSISTANT

## 2023-10-17 RX ORDER — FAMOTIDINE 40 MG/1
TABLET, FILM COATED ORAL
Qty: 90 TABLET | Refills: 3 | Status: SHIPPED | OUTPATIENT
Start: 2023-10-17

## 2023-10-17 RX ORDER — GABAPENTIN 600 MG/1
600 TABLET ORAL 3 TIMES DAILY
Qty: 270 TABLET | Refills: 3 | Status: SHIPPED | OUTPATIENT
Start: 2023-10-17 | End: 2024-04-16

## 2023-10-17 RX ORDER — TRAZODONE HYDROCHLORIDE 100 MG/1
TABLET ORAL
Qty: 90 TABLET | Refills: 3 | Status: SHIPPED | OUTPATIENT
Start: 2023-10-17

## 2023-10-17 RX ORDER — PSEUDOEPHEDRINE HCL 120 MG/1
120 TABLET, FILM COATED, EXTENDED RELEASE ORAL EVERY 12 HOURS
Qty: 60 TABLET | Refills: 4 | Status: SHIPPED | OUTPATIENT
Start: 2023-10-17 | End: 2024-01-15

## 2023-10-17 RX ORDER — TRAMADOL HYDROCHLORIDE 50 MG/1
TABLET ORAL
Qty: 120 TABLET | Refills: 0 | Status: SHIPPED | OUTPATIENT
Start: 2023-10-17 | End: 2023-11-18

## 2023-10-17 ASSESSMENT — ENCOUNTER SYMPTOMS
HEMATOCHEZIA: 0
DYSURIA: 0
NAUSEA: 0
JOINT SWELLING: 0
SHORTNESS OF BREATH: 0
NERVOUS/ANXIOUS: 0
HEARTBURN: 0
DIZZINESS: 0
COUGH: 0
BREAST MASS: 0
CHILLS: 0
FEVER: 0
CONSTIPATION: 0
MYALGIAS: 0
PARESTHESIAS: 0
HEMATURIA: 0
ARTHRALGIAS: 0
WEAKNESS: 0
HEADACHES: 0
EYE PAIN: 0
SORE THROAT: 0
DIARRHEA: 0
FREQUENCY: 0
ABDOMINAL PAIN: 0
PALPITATIONS: 0

## 2023-10-17 ASSESSMENT — PATIENT HEALTH QUESTIONNAIRE - PHQ9
SUM OF ALL RESPONSES TO PHQ QUESTIONS 1-9: 8
10. IF YOU CHECKED OFF ANY PROBLEMS, HOW DIFFICULT HAVE THESE PROBLEMS MADE IT FOR YOU TO DO YOUR WORK, TAKE CARE OF THINGS AT HOME, OR GET ALONG WITH OTHER PEOPLE: SOMEWHAT DIFFICULT
SUM OF ALL RESPONSES TO PHQ QUESTIONS 1-9: 8

## 2023-10-17 NOTE — PATIENT INSTRUCTIONS
Can set up a mole biopsy appointment.     Patient Education    Preventive Health Recommendations  Female Ages 50 - 64    Yearly exam: See your health care provider every year in order to  Review health changes.   Discuss preventive care.    Review your medicines if your doctor has prescribed any.    Get a Pap test every three years (unless you have an abnormal result and your provider advises testing more often).  If you get Pap tests with HPV test, you only need to test every 5 years, unless you have an abnormal result.   You do not need a Pap test if your uterus was removed (hysterectomy) and you have not had cancer.  You should be tested each year for STDs (sexually transmitted diseases) if you're at risk.   Have a mammogram every 1 to 2 years.  Have a colonoscopy at age 45, or have a yearly FIT test (stool test). These exams screen for colon cancer.    Have a cholesterol test every 5 years, or more often if advised.  Have a diabetes test (fasting glucose) every three years. If you are at risk for diabetes, you should have this test more often.   If you are at risk for osteoporosis (brittle bone disease), think about having a bone density scan (DEXA).    Shots: Get a flu shot each year. Get a tetanus shot every 10 years.    Nutrition:   Eat at least 5 servings of fruits and vegetables each day.  Eat whole-grain bread, whole-wheat pasta and brown rice instead of white grains and rice.  Get adequate Calcium and Vitamin D.     Lifestyle  Exercise at least 150 minutes a week (30 minutes a day, 5 days a week). This will help you control your weight and prevent disease.  Limit alcohol to one drink per day.  No smoking.   Wear sunscreen to prevent skin cancer.   See your dentist every six months for an exam and cleaning.  See your eye doctor every 1 to 2 years.

## 2023-10-17 NOTE — PROGRESS NOTES
SUBJECTIVE:   CC: Nataliia is an 62 year old who presents for preventive health visit.       10/17/2023    11:49 AM   Additional Questions   Roomed by dee garcia       Healthy Habits:     Getting at least 3 servings of Calcium per day:  Yes    Bi-annual eye exam:  Yes    Dental care twice a year:  Yes    Sleep apnea or symptoms of sleep apnea:  None    Diet:  Regular (no restrictions)    Frequency of exercise:  4-5 days/week    Duration of exercise:  N/A    Taking medications regularly:  Yes    Medication side effects:  Not applicable    Additional concerns today:  Yes (Refills on medications)      Today's PHQ-9 Score:       10/17/2023    11:40 AM   PHQ-9 SCORE   PHQ-9 Total Score MyChart 8 (Mild depression)   PHQ-9 Total Score 8       Taking the Tramadol 1 tab 4 times per day.   Increased gabapetnin helpful, increased weight 5#        Have you ever done Advance Care Planning? (For example, a Health Directive, POLST, or a discussion with a medical provider or your loved ones about your wishes): No, advance care planning information given to patient to review.  Patient declined advance care planning discussion at this time.    Social History     Tobacco Use     Smoking status: Never     Smokeless tobacco: Never   Substance Use Topics     Alcohol use: No             10/14/2023    10:56 AM   Alcohol Use   Prescreen: >3 drinks/day or >7 drinks/week? No     Reviewed orders with patient.  Reviewed health maintenance and updated orders accordingly - Yes      Breast Cancer Screenin/30/2023     9:36 AM   Breast CA Risk Assessment (FHS-7)   Do you have a family history of breast, colon, or ovarian cancer? No / Unknown           Pertinent mammograms are reviewed under the imaging tab.    History of abnormal Pap smear: NO - age 30-65 PAP every 5 years with negative HPV co-testing recommended      Latest Ref Rng & Units 2018     2:27 PM 2018     2:21 PM   PAP / HPV   PAP (Historical)  NIL     HPV 16 DNA  "NEG^Negative  Negative    HPV 18 DNA NEG^Negative  Negative    Other HR HPV NEG^Negative  Negative      Reviewed and updated as needed this visit by clinical staff   Tobacco  Allergies  Meds  Problems  Med Hx  Surg Hx  Fam Hx          Reviewed and updated as needed this visit by Provider   Tobacco  Allergies  Meds  Problems  Med Hx  Surg Hx  Fam Hx             Review of Systems   Constitutional:  Negative for chills and fever.   HENT:  Negative for congestion, ear pain, hearing loss and sore throat.    Eyes:  Negative for pain and visual disturbance.   Respiratory:  Negative for cough and shortness of breath.    Cardiovascular:  Negative for chest pain, palpitations and peripheral edema.   Gastrointestinal:  Negative for abdominal pain, constipation, diarrhea, heartburn, hematochezia and nausea.   Breasts:  Negative for tenderness, breast mass and discharge.   Genitourinary:  Negative for dysuria, frequency, genital sores, hematuria, pelvic pain, urgency, vaginal bleeding and vaginal discharge.   Musculoskeletal:  Negative for arthralgias, joint swelling and myalgias.   Skin:  Negative for rash.   Neurological:  Negative for dizziness, weakness, headaches and paresthesias.   Psychiatric/Behavioral:  Negative for mood changes. The patient is not nervous/anxious.           OBJECTIVE:   /88 (BP Location: Left arm, Patient Position: Chair, Cuff Size: Adult Regular)   Pulse 95   Temp 98.5  F (36.9  C) (Oral)   Resp 18   Ht 1.626 m (5' 4\")   Wt 50.7 kg (111 lb 12.8 oz)   SpO2 98%   BMI 19.19 kg/m    Physical Exam  GENERAL: healthy, alert and no distress  EYES: Eyes grossly normal to inspection, PERRL and conjunctivae and sclerae normal  HENT: ear canals and TM's normal, nose and mouth without ulcers or lesions  NECK: no adenopathy, no asymmetry, masses, or scars and thyroid normal to palpation  RESP: lungs clear to auscultation - no rales, rhonchi or wheezes  CV: regular rate and rhythm, normal " S1 S2, no S3 or S4, no murmur, click or rub, no peripheral edema   ABDOMEN: soft, nontender, no hepatosplenomegaly, no masses and bowel sounds normal  MS: no gross musculoskeletal defects noted, no edema  SKIN: no suspicious lesions or rashes  NEURO: Normal strength and tone, mentation intact and speech normal  PSYCH: mentation appears normal, affect normal/bright        ASSESSMENT/PLAN:   (Z00.00) Routine general medical examination at a health care facility  (primary encounter diagnosis)  Comment:   Plan:     (Z12.4) Cervical cancer screening  Comment:   Plan: Patient declines pap smear today.     (K25.9) Gastric ulcer without hemorrhage or perforation, unspecified chronicity  Comment:   Plan: famotidine (PEPCID) 40 MG tablet        Stable, refilled.     (F33.1) Major depressive disorder, recurrent episode, moderate (H)  Comment:   Plan: FLUoxetine (PROZAC) 20 MG capsule        Stable refilled.     (M19.132) Post-traumatic osteoarthritis of left wrist  Comment:   Plan: Basic metabolic panel, Drug Confirmation Panel         Urine with Creat - lab collect, gabapentin         (NEURONTIN) 600 MG tablet        Stable, meds help her stay working. Drug screen needed.     (M12.532) Traumatic arthritis of left wrist  Comment:   Plan: Basic metabolic panel, Drug Confirmation Panel         Urine with Creat - lab collect, gabapentin         (NEURONTIN) 600 MG tablet, traMADol (ULTRAM) 50        MG tablet            (G47.00) Persistent insomnia  Comment:   Plan: traZODone (DESYREL) 100 MG tablet        Daily use.     (J30.89) Chronic non-seasonal allergic rhinitis  Comment:   Plan: pseudoePHEDrine (SUDAFED) 120 MG 12 hr tablet        Refilled. Blood pressure is stable.     (Z13.220) Screening for hyperlipidemia  Comment:   Plan: Lipid panel reflex to direct LDL Non-fasting                  COUNSELING:  Reviewed preventive health counseling, as reflected in patient instructions       Regular exercise        She reports that she  has never smoked. She has never used smokeless tobacco.          Meg Bernardo PA-C  [facility]  Answers submitted by the patient for this visit:  Patient Health Questionnaire (Submitted on 10/17/2023)  If you checked off any problems, how difficult have these problems made it for you to do your work, take care of things at home, or get along with other people?: Somewhat difficult  PHQ9 TOTAL SCORE: 8

## 2023-10-18 LAB
ANION GAP SERPL CALCULATED.3IONS-SCNC: 9 MMOL/L (ref 7–15)
BUN SERPL-MCNC: 19.3 MG/DL (ref 8–23)
CALCIUM SERPL-MCNC: 9.3 MG/DL (ref 8.8–10.2)
CHLORIDE SERPL-SCNC: 104 MMOL/L (ref 98–107)
CHOLEST SERPL-MCNC: 169 MG/DL
CREAT SERPL-MCNC: 0.69 MG/DL (ref 0.51–0.95)
DEPRECATED HCO3 PLAS-SCNC: 26 MMOL/L (ref 22–29)
EGFRCR SERPLBLD CKD-EPI 2021: >90 ML/MIN/1.73M2
GLUCOSE SERPL-MCNC: 89 MG/DL (ref 70–99)
HDLC SERPL-MCNC: 65 MG/DL
LDLC SERPL CALC-MCNC: 88 MG/DL
NONHDLC SERPL-MCNC: 104 MG/DL
POTASSIUM SERPL-SCNC: 4.8 MMOL/L (ref 3.4–5.3)
SODIUM SERPL-SCNC: 139 MMOL/L (ref 135–145)
TRIGL SERPL-MCNC: 80 MG/DL

## 2023-10-23 LAB
GABAPENTIN UR QL CFM: PRESENT
N-NORTRAMADOL/CREAT UR CFM: ABNORMAL NG/MG {CREAT}
O-NORTRAMADOL UR CFM-MCNC: ABNORMAL NG/ML
TRAMADOL CTO UR CFM-MCNC: ABNORMAL NG/ML
TRAMADOL/CREAT UR: ABNORMAL

## 2023-11-17 DIAGNOSIS — M12.532 TRAUMATIC ARTHRITIS OF LEFT WRIST: ICD-10-CM

## 2023-11-18 RX ORDER — TRAMADOL HYDROCHLORIDE 50 MG/1
50 TABLET ORAL EVERY 6 HOURS PRN
Qty: 120 TABLET | Refills: 0 | Status: SHIPPED | OUTPATIENT
Start: 2023-11-18 | End: 2023-12-19

## 2023-12-19 DIAGNOSIS — M12.532 TRAUMATIC ARTHRITIS OF LEFT WRIST: ICD-10-CM

## 2023-12-19 RX ORDER — TRAMADOL HYDROCHLORIDE 50 MG/1
50 TABLET ORAL EVERY 6 HOURS PRN
Qty: 120 TABLET | Refills: 0 | Status: SHIPPED | OUTPATIENT
Start: 2023-12-19 | End: 2024-01-29

## 2024-01-15 DIAGNOSIS — J30.89 CHRONIC NON-SEASONAL ALLERGIC RHINITIS: ICD-10-CM

## 2024-01-15 RX ORDER — PSEUDOEPHEDRINE HCL 120 MG/1
120 TABLET, FILM COATED, EXTENDED RELEASE ORAL EVERY 12 HOURS
Qty: 60 TABLET | Refills: 0 | Status: SHIPPED | OUTPATIENT
Start: 2024-01-15 | End: 2024-02-23

## 2024-01-29 DIAGNOSIS — M12.532 TRAUMATIC ARTHRITIS OF LEFT WRIST: ICD-10-CM

## 2024-01-29 RX ORDER — TRAMADOL HYDROCHLORIDE 50 MG/1
50 TABLET ORAL EVERY 6 HOURS PRN
Qty: 120 TABLET | Refills: 0 | Status: SHIPPED | OUTPATIENT
Start: 2024-01-29 | End: 2024-03-11

## 2024-02-19 DIAGNOSIS — F33.1 MAJOR DEPRESSIVE DISORDER, RECURRENT EPISODE, MODERATE (H): ICD-10-CM

## 2024-02-19 RX ORDER — ARIPIPRAZOLE 5 MG/1
TABLET ORAL
Qty: 90 TABLET | Refills: 1 | Status: SHIPPED | OUTPATIENT
Start: 2024-02-19 | End: 2024-07-25

## 2024-02-23 DIAGNOSIS — J30.89 CHRONIC NON-SEASONAL ALLERGIC RHINITIS: ICD-10-CM

## 2024-02-23 RX ORDER — PSEUDOEPHEDRINE HCL 120 MG/1
120 TABLET, FILM COATED, EXTENDED RELEASE ORAL EVERY 12 HOURS
Qty: 60 TABLET | Refills: 5 | Status: SHIPPED | OUTPATIENT
Start: 2024-02-23 | End: 2024-02-26

## 2024-02-25 DIAGNOSIS — J30.89 CHRONIC NON-SEASONAL ALLERGIC RHINITIS: ICD-10-CM

## 2024-02-28 DIAGNOSIS — J30.89 CHRONIC NON-SEASONAL ALLERGIC RHINITIS: ICD-10-CM

## 2024-02-28 RX ORDER — PSEUDOEPHEDRINE HCL 120 MG/1
120 TABLET, FILM COATED, EXTENDED RELEASE ORAL EVERY 12 HOURS
Qty: 60 TABLET | Refills: 5 | Status: CANCELLED | OUTPATIENT
Start: 2024-02-28

## 2024-02-29 NOTE — TELEPHONE ENCOUNTER
I faxed the prescription to patients Pharmacy on 02/26/28.  I sent her a message that this was done.  Radha Mccurdy   [employer]

## 2024-03-01 DIAGNOSIS — Z12.11 COLON CANCER SCREENING: ICD-10-CM

## 2024-03-05 DIAGNOSIS — M12.532 TRAUMATIC ARTHRITIS OF LEFT WRIST: ICD-10-CM

## 2024-03-05 NOTE — TELEPHONE ENCOUNTER
1st attempt: Called patient. Unable to reach patient. Voicemail has not been set up yet. Will try again at a later time.  Kiki Song CMA

## 2024-03-05 NOTE — LETTER
March 11, 2024    Nataliia Flanagan  [address] [address]      Dear Nataliia Flanagan,     Your provider has sent a  amna refill of traMADol (ULTRAM) 50 MG tablet . You are due for an appointment for further refills.  Please contact the clinic to schedule an appointment for further refills. Please call our scheduling line at [phone] or you can schedule via [email or web address].         Your Health Care Team,    [employer]

## 2024-03-05 NOTE — TELEPHONE ENCOUNTER
Patient is overdue for office visit follow up schedule and will then refill.   Meg Bernardo PA-C

## 2024-03-06 ENCOUNTER — MYC MEDICAL ADVICE (OUTPATIENT)
Dept: FAMILY MEDICINE | Facility: CLINIC | Age: 64
End: 2024-03-06
Payer: COMMERCIAL

## 2024-03-06 NOTE — TELEPHONE ENCOUNTER
"Sent pt [employer] message stating, \"Meg Bernardo PA-C said that she would like you to schedule your Physical and then she will give you a amna refill of you traMADol (ULTRAM) 50mg tablet. If you would like help scheduling this appointment please call [phone] or you can schedule via [employer].\" Please help pt schedule Physical appointment with pcp. Once that is completed please route encounter back to Meg Bernardo PA-C.    Kerri CHUNG MA    "

## 2024-03-08 NOTE — TELEPHONE ENCOUNTER
Patient calling asking for status of Tramadol refill. Patient states that she is currently out of prescription and has been for a few days.    Patient was instructed to schedule physical and then PCP will give refill. Patient was last seen 10/17/23 with instruction to follow-up yearly. Patient is scheduled for next yearly physical on 10/18/24. Would you like patient to be seen sooner? Patient states that she is agreeable if needed.    Routing to PCP for refill.    Please route back to team, patient requests a [employer] message alerting her when this has been completed.    Thanks,  Michelle Avalos, LEXXN RN  [facility]

## 2024-03-08 NOTE — TELEPHONE ENCOUNTER
Patient calling asking for status of Tramadol refill. Patient states that she is currently out of prescription and has been for a few days.     Patient was instructed to schedule physical and then PCP will give refill. Patient was last seen 10/17/23 with instruction to follow-up yearly. Patient is scheduled for next yearly physical on 10/18/24. Would you like patient to be seen sooner? Patient states that she is agreeable if needed.     Routing to PCP for refill.     Please route back to team, patient requests a [employer] message alerting her when this has been completed.     Thanks,  Michelle Avalos, LEXXN RN  [facility]

## 2024-03-11 RX ORDER — TRAMADOL HYDROCHLORIDE 50 MG/1
50 TABLET ORAL EVERY 6 HOURS PRN
Qty: 120 TABLET | Refills: 0 | Status: SHIPPED | OUTPATIENT
Start: 2024-03-11 | End: 2024-04-11

## 2024-03-11 NOTE — TELEPHONE ENCOUNTER
Called pt  lvm, and sent letter. Please help pt schedule an appointment for a medication check in 3  months with pcp.    Kerri CHUNG MA

## 2024-03-11 NOTE — TELEPHONE ENCOUNTER
You are correct. Patient does not need physical, needs office visit and she is overdue for pap smear. Patient needs to be seen every 3 months for her continued pain management.   Meg Bernardo PA-C

## 2024-03-14 ENCOUNTER — TELEPHONE (OUTPATIENT)
Dept: FAMILY MEDICINE | Facility: CLINIC | Age: 64
End: 2024-03-14
Payer: COMMERCIAL

## 2024-03-14 DIAGNOSIS — Z12.11 SCREENING FOR COLON CANCER: Primary | ICD-10-CM

## 2024-03-14 NOTE — TELEPHONE ENCOUNTER
Received call from pt. Pt received a message via [employer] stating she has an order for a cologuard test. Pt has not received the test yet. Pt contacted [employer] for this and was advised to contact PCP to cancel the order. Pt states it is too expensive. It costs $353. Pt would prefer to do a FIT test.     Routing to PCP to advise.    Ellen Turner RN  [facility]

## 2024-03-15 ENCOUNTER — ORDERS ONLY (AUTO-RELEASED) (OUTPATIENT)
Dept: FAMILY MEDICINE | Facility: CLINIC | Age: 64
End: 2024-03-15
Payer: COMMERCIAL

## 2024-03-15 DIAGNOSIS — Z12.11 COLON CANCER SCREENING: ICD-10-CM

## 2024-03-15 NOTE — TELEPHONE ENCOUNTER
Called patient. Left voice message to return call at [phone].    When patient returns call, please inform her that FIT test order was placed as requested, and she should be receiving in the mail in the next few days.    Routing to team to please ensure FIT test kit is mailed to patient to address on file.    LEXX NashN RN  [facility]

## 2024-03-19 DIAGNOSIS — J30.89 CHRONIC NON-SEASONAL ALLERGIC RHINITIS: ICD-10-CM

## 2024-03-19 RX ORDER — FLUOXETINE 40 MG/1
40 CAPSULE ORAL DAILY
Qty: 90 CAPSULE | Refills: 1 | OUTPATIENT
Start: 2024-03-19

## 2024-03-19 RX ORDER — PSEUDOEPHEDRINE HCL 120 MG/1
120 TABLET, FILM COATED, EXTENDED RELEASE ORAL EVERY 12 HOURS
Qty: 60 TABLET | Refills: 5 | Status: SHIPPED | OUTPATIENT
Start: 2024-03-19 | End: 2024-10-03

## 2024-03-25 RX ORDER — FLUOXETINE 40 MG/1
40 CAPSULE ORAL DAILY
Qty: 90 CAPSULE | Refills: 1 | OUTPATIENT
Start: 2024-03-25

## 2024-04-11 DIAGNOSIS — M12.532 TRAUMATIC ARTHRITIS OF LEFT WRIST: ICD-10-CM

## 2024-04-11 LAB — NONINV COLON CA DNA+OCC BLD SCRN STL QL: NORMAL

## 2024-04-11 RX ORDER — TRAMADOL HYDROCHLORIDE 50 MG/1
50 TABLET ORAL EVERY 6 HOURS PRN
Qty: 120 TABLET | Refills: 0 | Status: SHIPPED | OUTPATIENT
Start: 2024-04-11 | End: 2024-05-28

## 2024-04-14 PROCEDURE — 82274 ASSAY TEST FOR BLOOD FECAL: CPT | Performed by: PHYSICIAN ASSISTANT

## 2024-04-16 ENCOUNTER — OFFICE VISIT (OUTPATIENT)
Dept: FAMILY MEDICINE | Facility: CLINIC | Age: 64
End: 2024-04-16
Payer: COMMERCIAL

## 2024-04-16 VITALS
SYSTOLIC BLOOD PRESSURE: 156 MMHG | RESPIRATION RATE: 19 BRPM | TEMPERATURE: 98.6 F | OXYGEN SATURATION: 97 % | HEIGHT: 64 IN | BODY MASS INDEX: 19.63 KG/M2 | HEART RATE: 83 BPM | DIASTOLIC BLOOD PRESSURE: 88 MMHG | WEIGHT: 115 LBS

## 2024-04-16 DIAGNOSIS — F11.20 CONTINUOUS OPIOID DEPENDENCE (H): Primary | ICD-10-CM

## 2024-04-16 DIAGNOSIS — R03.0 ELEVATED BP WITHOUT DIAGNOSIS OF HYPERTENSION: ICD-10-CM

## 2024-04-16 DIAGNOSIS — J31.0 CHRONIC RHINITIS: ICD-10-CM

## 2024-04-16 DIAGNOSIS — Z12.4 CERVICAL CANCER SCREENING: ICD-10-CM

## 2024-04-16 DIAGNOSIS — M19.132 POST-TRAUMATIC OSTEOARTHRITIS OF LEFT WRIST: ICD-10-CM

## 2024-04-16 DIAGNOSIS — M12.532 TRAUMATIC ARTHRITIS OF LEFT WRIST: ICD-10-CM

## 2024-04-16 PROCEDURE — 87624 HPV HI-RISK TYP POOLED RSLT: CPT | Performed by: PHYSICIAN ASSISTANT

## 2024-04-16 PROCEDURE — G2211 COMPLEX E/M VISIT ADD ON: HCPCS | Performed by: PHYSICIAN ASSISTANT

## 2024-04-16 PROCEDURE — 99214 OFFICE O/P EST MOD 30 MIN: CPT | Performed by: PHYSICIAN ASSISTANT

## 2024-04-16 PROCEDURE — G0123 SCREEN CERV/VAG THIN LAYER: HCPCS | Performed by: PHYSICIAN ASSISTANT

## 2024-04-16 RX ORDER — GABAPENTIN 600 MG/1
600 TABLET ORAL 2 TIMES DAILY
Status: SHIPPED
Start: 2024-04-16 | End: 2024-07-24

## 2024-04-16 ASSESSMENT — ANXIETY QUESTIONNAIRES
6. BECOMING EASILY ANNOYED OR IRRITABLE: SEVERAL DAYS
3. WORRYING TOO MUCH ABOUT DIFFERENT THINGS: SEVERAL DAYS
IF YOU CHECKED OFF ANY PROBLEMS ON THIS QUESTIONNAIRE, HOW DIFFICULT HAVE THESE PROBLEMS MADE IT FOR YOU TO DO YOUR WORK, TAKE CARE OF THINGS AT HOME, OR GET ALONG WITH OTHER PEOPLE: VERY DIFFICULT
GAD7 TOTAL SCORE: 7
GAD7 TOTAL SCORE: 7
1. FEELING NERVOUS, ANXIOUS, OR ON EDGE: SEVERAL DAYS
5. BEING SO RESTLESS THAT IT IS HARD TO SIT STILL: SEVERAL DAYS
7. FEELING AFRAID AS IF SOMETHING AWFUL MIGHT HAPPEN: SEVERAL DAYS
2. NOT BEING ABLE TO STOP OR CONTROL WORRYING: SEVERAL DAYS

## 2024-04-16 ASSESSMENT — PATIENT HEALTH QUESTIONNAIRE - PHQ9
SUM OF ALL RESPONSES TO PHQ QUESTIONS 1-9: 8
SUM OF ALL RESPONSES TO PHQ QUESTIONS 1-9: 8
5. POOR APPETITE OR OVEREATING: SEVERAL DAYS
10. IF YOU CHECKED OFF ANY PROBLEMS, HOW DIFFICULT HAVE THESE PROBLEMS MADE IT FOR YOU TO DO YOUR WORK, TAKE CARE OF THINGS AT HOME, OR GET ALONG WITH OTHER PEOPLE: VERY DIFFICULT

## 2024-04-16 NOTE — PATIENT INSTRUCTIONS
Reminder- pain visits should be every 3 months- next visit due in Mid July.     Your mammogram is due this month.     Please schedule your pap smear test when you have time.     140/90- for blood pressures.

## 2024-04-16 NOTE — LETTER

## 2024-04-16 NOTE — PROGRESS NOTES
Assessment & Plan     Continuous opioid dependence (H)  Completed a pain contract today.     Post-traumatic osteoarthritis of left wrist  Patient decreased gabapentin due to side effects. Will need to monitor effect on pain. Tramadol allows her to continue her job.   - gabapentin (NEURONTIN) 600 MG tablet; Take 1 tablet (600 mg) by mouth 2 times daily    Traumatic arthritis of left wrist  As above.   - gabapentin (NEURONTIN) 600 MG tablet; Take 1 tablet (600 mg) by mouth 2 times daily    Cervical cancer screening  - Pap screen with HPV - recommended age 30 - 65 years    Chronic rhinitis  Patient taking pseudoephedrine daily for rhinitis, blood pressure usually well controlled.     Elevated BP without diagnosis of hypertension  Patient will monitor at home and if increased may need to stop allergy meds.           The longitudinal plan of care for the diagnosis(es)/condition(s) as documented were addressed during this visit. Due to the added complexity in care, I will continue to support Nataliia in the subsequent management and with ongoing continuity of care.          Subjective   Nataliia is a 63 year old, presenting for the following health issues:  Pain        4/16/2024     1:29 PM   Additional Questions   Roomed by dee ESCOBAR       Pain History:  When did you first notice your pain?    Have you seen this provider for your pain in the past? Yes   Where in your body do you have pain? Joints and hands  Are you seeing anyone else for your pain? No        7/6/2023     2:05 PM 10/17/2023    11:40 AM 4/16/2024     1:26 PM   PHQ-9 SCORE   PHQ-9 Total Score MyChart 7 (Mild depression) 8 (Mild depression) 8 (Mild depression)   PHQ-9 Total Score 7 8 8                 1/28/2022    10:30 AM 3/1/2022    11:27 AM 4/16/2024     1:30 PM   CAROLYN-7 SCORE   Total Score 13 (moderate anxiety)     Total Score 13 14 7           3/1/2022    11:26 AM 8/29/2022    12:10 PM 4/16/2024     1:31 PM   PEG Score   PEG Total Score 5 3 5  "      Chronic Pain Follow Up:    Location of pain: hands  Analgesia/pain control:    - Recent changes:  worst at work- wants to retire.     - Overall control: Tolerable with discomfort    - Current treatments: 1 tablet 4 times per day.   Adherence:     - Do you ever take more pain medicine than prescribed? No    - When did you take your last dose of pain medicine?  This morning.    Adverse effects: No   PDMP Review         Value Time User    State PDMP site checked  Yes 11/18/2023  4:38 PM Elsy Hood MD          Last CSA Agreement:   CSA -- Patient Level:     [Media Unavailable] Controlled Substance Agreement - Opioid - Scan on 1/31/2023 12:56 PM   [Media Unavailable] Controlled Substance Agreement - Opioid - Scan on 3/1/2022 11:53 AM   [Media Unavailable] Controlled Substance Agreement - Opioid - Scan on 1/12/2021  7:50 AM   [Media Unavailable] Controlled Substance Agreement - Opioid - Scan on 7/15/2019  1:42 PM       Last UDS: 10/23/2023            Gabapentin. Changed from TID to BID. Noted she almost slipped in the tub. Does help her mood. Wants to keep it the same for now   Sister passed away this winter.                   Objective    BP (!) 148/87 (BP Location: Left arm, Patient Position: Chair, Cuff Size: Adult Regular)   Pulse 83   Temp 98.6  F (37  C) (Oral)   Resp 19   Ht 1.626 m (5' 4\")   Wt 52.2 kg (115 lb)   SpO2 97%   BMI 19.74 kg/m    Body mass index is 19.74 kg/m .  Physical Exam   GENERAL: alert and no distress   (female): normal female external genitalia, vaginal atrophy with atrophic cervix.            Signed Electronically by: Meg Bernardo PA-C    "

## 2024-04-17 LAB — HEMOCCULT STL QL IA: NEGATIVE

## 2024-04-19 LAB
BKR LAB AP GYN ADEQUACY: NORMAL
BKR LAB AP GYN INTERPRETATION: NORMAL
BKR LAB AP HPV REFLEX: NORMAL
BKR LAB AP PREVIOUS ABNORMAL: NORMAL
PATH REPORT.COMMENTS IMP SPEC: NORMAL
PATH REPORT.COMMENTS IMP SPEC: NORMAL
PATH REPORT.RELEVANT HX SPEC: NORMAL

## 2024-04-22 LAB
HUMAN PAPILLOMA VIRUS 16 DNA: NEGATIVE
HUMAN PAPILLOMA VIRUS 18 DNA: NEGATIVE
HUMAN PAPILLOMA VIRUS FINAL DIAGNOSIS: NORMAL
HUMAN PAPILLOMA VIRUS OTHER HR: NEGATIVE

## 2024-05-28 ENCOUNTER — TELEPHONE (OUTPATIENT)
Dept: FAMILY MEDICINE | Facility: CLINIC | Age: 64
End: 2024-05-28
Payer: COMMERCIAL

## 2024-05-28 NOTE — TELEPHONE ENCOUNTER
RN noted Annual Review of  orders have been reviewed. No mammogram orders needed as it is listed as overdue.    Called patient. Left detailed voice message on identified voicemail box regarding mammogram orders and provided imaging scheduling number, advised to return call at [phone] for any further questions.     LEXX NashN RN  [facility]

## 2024-05-28 NOTE — TELEPHONE ENCOUNTER
Order/Referral Request    Who is requesting: pt    Orders being requested: mammogram order    Reason service is needed/diagnosis: pt discussed with doctor    When are orders needed by: asap    Has this been discussed with Provider: Yes    Does patient have a preference on a Group/Provider/Facility? [facility]    Does patient have an appointment scheduled?: No    Where to send orders: Place orders within Epic    Could we send this information to you in [facility] or would you prefer to receive a phone call?:   Patient would prefer a phone call   Okay to leave a detailed message?: Yes at Home number on file [phone] (home)

## 2024-05-30 ENCOUNTER — ANCILLARY ORDERS (OUTPATIENT)
Dept: FAMILY MEDICINE | Facility: CLINIC | Age: 64
End: 2024-05-30

## 2024-05-30 DIAGNOSIS — Z12.31 VISIT FOR SCREENING MAMMOGRAM: Primary | ICD-10-CM

## 2024-06-12 ENCOUNTER — ANCILLARY PROCEDURE (OUTPATIENT)
Dept: MAMMOGRAPHY | Facility: CLINIC | Age: 64
End: 2024-06-12
Attending: PHYSICIAN ASSISTANT
Payer: COMMERCIAL

## 2024-06-12 ENCOUNTER — ANCILLARY ORDERS (OUTPATIENT)
Dept: FAMILY MEDICINE | Facility: CLINIC | Age: 64
End: 2024-06-12

## 2024-06-12 DIAGNOSIS — Z12.31 VISIT FOR SCREENING MAMMOGRAM: ICD-10-CM

## 2024-06-12 DIAGNOSIS — Z12.31 VISIT FOR SCREENING MAMMOGRAM: Primary | ICD-10-CM

## 2024-06-12 PROCEDURE — 77063 BREAST TOMOSYNTHESIS BI: CPT | Mod: TC | Performed by: STUDENT IN AN ORGANIZED HEALTH CARE EDUCATION/TRAINING PROGRAM

## 2024-06-12 PROCEDURE — 77067 SCR MAMMO BI INCL CAD: CPT | Mod: TC | Performed by: STUDENT IN AN ORGANIZED HEALTH CARE EDUCATION/TRAINING PROGRAM

## 2024-07-03 ENCOUNTER — OFFICE VISIT (OUTPATIENT)
Dept: FAMILY MEDICINE | Facility: CLINIC | Age: 64
End: 2024-07-03
Payer: COMMERCIAL

## 2024-07-03 VITALS
HEIGHT: 64 IN | RESPIRATION RATE: 18 BRPM | HEART RATE: 101 BPM | TEMPERATURE: 96.4 F | DIASTOLIC BLOOD PRESSURE: 78 MMHG | WEIGHT: 115 LBS | BODY MASS INDEX: 19.63 KG/M2 | OXYGEN SATURATION: 95 % | SYSTOLIC BLOOD PRESSURE: 120 MMHG
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
## 2024-07-03 DIAGNOSIS — M12.532 TRAUMATIC ARTHRITIS OF LEFT WRIST: ICD-10-CM

## 2024-07-03 DIAGNOSIS — F11.20 CONTINUOUS OPIOID DEPENDENCE (H): ICD-10-CM

## 2024-07-03 DIAGNOSIS — G89.4 CHRONIC PAIN SYNDROME: ICD-10-CM

## 2024-07-03 DIAGNOSIS — F32.1 CURRENT MODERATE EPISODE OF MAJOR DEPRESSIVE DISORDER, UNSPECIFIED WHETHER RECURRENT (H): ICD-10-CM

## 2024-07-03 DIAGNOSIS — Z09 HOSPITAL DISCHARGE FOLLOW-UP: Primary | ICD-10-CM

## 2024-07-03 DIAGNOSIS — S22.20XD CLOSED FRACTURE OF STERNUM WITH ROUTINE HEALING, UNSPECIFIED PORTION OF STERNUM, SUBSEQUENT ENCOUNTER: ICD-10-CM

## 2024-07-03 PROCEDURE — 99495 TRANSJ CARE MGMT MOD F2F 14D: CPT | Performed by: NURSE PRACTITIONER

## 2024-07-03 RX ORDER — OXYCODONE HYDROCHLORIDE 5 MG/1
5-10 TABLET ORAL
COMMUNITY
Start: 2024-07-01

## 2024-07-03 RX ORDER — METHOCARBAMOL 500 MG/1
500-1000 TABLET, FILM COATED ORAL
COMMUNITY
Start: 2024-07-01

## 2024-07-03 RX ORDER — TRAMADOL HYDROCHLORIDE 50 MG/1
50 TABLET ORAL EVERY 6 HOURS PRN
Qty: 120 TABLET | Refills: 0 | Status: SHIPPED | OUTPATIENT
Start: 2024-07-03 | End: 2024-08-08

## 2024-07-03 RX ORDER — ACETAMINOPHEN 500 MG
1000 TABLET ORAL EVERY 6 HOURS
COMMUNITY
Start: 2024-07-01

## 2024-07-03 RX ORDER — IBUPROFEN 600 MG/1
600 TABLET, FILM COATED ORAL
COMMUNITY
Start: 2024-07-01

## 2024-07-03 RX ORDER — LIDOCAINE 4 G/G
PATCH TOPICAL
COMMUNITY
Start: 2024-07-02

## 2024-07-03 ASSESSMENT — PAIN SCALES - GENERAL: PAINLEVEL: WORST PAIN (10)
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
## 2024-07-03 NOTE — LETTER
July 3, 2024      Nataliia Flanagan  [address], [place]. \[facility].\"" [id number]        To Whom It May Concern:    Nataliia Flanagan  was seen on 07/03/2024.  Job description details reviewed, due to sternal fracture, please keep her off work for 8 weeks to allow sternum to heal before returning to work.         Sincerely,        CANDY MORALES, FRANCES-BC

## 2024-07-24 DIAGNOSIS — M12.532 TRAUMATIC ARTHRITIS OF LEFT WRIST: ICD-10-CM

## 2024-07-24 DIAGNOSIS — M19.132 POST-TRAUMATIC OSTEOARTHRITIS OF LEFT WRIST: ICD-10-CM

## 2024-07-24 DIAGNOSIS — F33.1 MAJOR DEPRESSIVE DISORDER, RECURRENT EPISODE, MODERATE (H): ICD-10-CM

## 2024-07-25 RX ORDER — GABAPENTIN 600 MG/1
600 TABLET ORAL 2 TIMES DAILY
Qty: 180 TABLET | Refills: 0 | Status: SHIPPED | OUTPATIENT
Start: 2024-07-25

## 2024-07-25 RX ORDER — ARIPIPRAZOLE 5 MG/1
TABLET ORAL
Qty: 90 TABLET | Refills: 1 | Status: SHIPPED | OUTPATIENT
Start: 2024-07-25

## 2024-08-08 DIAGNOSIS — M12.532 TRAUMATIC ARTHRITIS OF LEFT WRIST: ICD-10-CM

## 2024-08-08 RX ORDER — TRAMADOL HYDROCHLORIDE 50 MG/1
50 TABLET ORAL EVERY 6 HOURS PRN
Qty: 120 TABLET | Refills: 0 | Status: SHIPPED | OUTPATIENT
Start: 2024-08-08 | End: 2024-09-19

## 2024-08-08 NOTE — TELEPHONE ENCOUNTER
Called and spoke with patient and informed message below. Patient will call back to schedule if needed before her appointment in October. Yumiko Lowe MA

## 2024-09-18 DIAGNOSIS — M12.532 TRAUMATIC ARTHRITIS OF LEFT WRIST: ICD-10-CM

## 2024-09-19 RX ORDER — TRAMADOL HYDROCHLORIDE 50 MG/1
50 TABLET ORAL EVERY 6 HOURS PRN
Qty: 120 TABLET | Refills: 0 | Status: SHIPPED | OUTPATIENT
Start: 2024-09-19

## 2024-10-02 DIAGNOSIS — J30.89 CHRONIC NON-SEASONAL ALLERGIC RHINITIS: ICD-10-CM

## 2024-10-03 RX ORDER — PSEUDOEPHEDRINE HCL 120 MG/1
1 TABLET, FILM COATED, EXTENDED RELEASE ORAL 2 TIMES DAILY
Qty: 60 TABLET | Refills: 0 | Status: SHIPPED | OUTPATIENT
Start: 2024-10-03 | End: 2024-11-04

## 2024-10-18 ENCOUNTER — OFFICE VISIT (OUTPATIENT)
Dept: FAMILY MEDICINE | Facility: CLINIC | Age: 64
End: 2024-10-18
Attending: PHYSICIAN ASSISTANT
Payer: COMMERCIAL

## 2024-10-18 VITALS
OXYGEN SATURATION: 99 % | BODY MASS INDEX: 19.74 KG/M2 | SYSTOLIC BLOOD PRESSURE: 138 MMHG | TEMPERATURE: 98.2 F | DIASTOLIC BLOOD PRESSURE: 88 MMHG | RESPIRATION RATE: 20 BRPM | HEART RATE: 88 BPM | HEIGHT: 64 IN | WEIGHT: 115.6 LBS

## 2024-10-18 DIAGNOSIS — M19.132 POST-TRAUMATIC OSTEOARTHRITIS OF LEFT WRIST: ICD-10-CM

## 2024-10-18 DIAGNOSIS — M12.532 TRAUMATIC ARTHRITIS OF LEFT WRIST: ICD-10-CM

## 2024-10-18 DIAGNOSIS — F33.1 MAJOR DEPRESSIVE DISORDER, RECURRENT EPISODE, MODERATE (H): ICD-10-CM

## 2024-10-18 DIAGNOSIS — K25.9 GASTRIC ULCER WITHOUT HEMORRHAGE OR PERFORATION, UNSPECIFIED CHRONICITY: ICD-10-CM

## 2024-10-18 DIAGNOSIS — Z00.00 ROUTINE GENERAL MEDICAL EXAMINATION AT A HEALTH CARE FACILITY: Primary | ICD-10-CM

## 2024-10-18 DIAGNOSIS — G47.00 PERSISTENT INSOMNIA: ICD-10-CM

## 2024-10-18 DIAGNOSIS — L65.9 HAIR LOSS: ICD-10-CM

## 2024-10-18 LAB
BASOPHILS # BLD AUTO: 0 10E3/UL (ref 0–0.2)
BASOPHILS NFR BLD AUTO: 1 %
CREAT UR-MCNC: 87 MG/DL
EOSINOPHIL # BLD AUTO: 0.1 10E3/UL (ref 0–0.7)
EOSINOPHIL NFR BLD AUTO: 3 %
ERYTHROCYTE [DISTWIDTH] IN BLOOD BY AUTOMATED COUNT: 12.2 % (ref 10–15)
EST. AVERAGE GLUCOSE BLD GHB EST-MCNC: 111 MG/DL
FERRITIN SERPL-MCNC: 48 NG/ML (ref 11–328)
HBA1C MFR BLD: 5.5 % (ref 0–5.6)
HCT VFR BLD AUTO: 37.8 % (ref 35–47)
HGB BLD-MCNC: 12.8 G/DL (ref 11.7–15.7)
IMM GRANULOCYTES # BLD: 0 10E3/UL
IMM GRANULOCYTES NFR BLD: 0 %
IRON BINDING CAPACITY (ROCHE): 320 UG/DL (ref 240–430)
IRON SATN MFR SERPL: 25 % (ref 15–46)
IRON SERPL-MCNC: 79 UG/DL (ref 37–145)
LYMPHOCYTES # BLD AUTO: 1.5 10E3/UL (ref 0.8–5.3)
LYMPHOCYTES NFR BLD AUTO: 39 %
MCH RBC QN AUTO: 28.8 PG (ref 26.5–33)
MCHC RBC AUTO-ENTMCNC: 33.9 G/DL (ref 31.5–36.5)
MCV RBC AUTO: 85 FL (ref 78–100)
MONOCYTES # BLD AUTO: 0.4 10E3/UL (ref 0–1.3)
MONOCYTES NFR BLD AUTO: 9 %
NEUTROPHILS # BLD AUTO: 1.9 10E3/UL (ref 1.6–8.3)
NEUTROPHILS NFR BLD AUTO: 48 %
PLATELET # BLD AUTO: 245 10E3/UL (ref 150–450)
RBC # BLD AUTO: 4.45 10E6/UL (ref 3.8–5.2)
TSH SERPL DL<=0.005 MIU/L-ACNC: 1.56 UIU/ML (ref 0.3–4.2)
VIT D+METAB SERPL-MCNC: 47 NG/ML (ref 20–50)
WBC # BLD AUTO: 3.9 10E3/UL (ref 4–11)

## 2024-10-18 PROCEDURE — 84443 ASSAY THYROID STIM HORMONE: CPT | Performed by: PHYSICIAN ASSISTANT

## 2024-10-18 PROCEDURE — 99214 OFFICE O/P EST MOD 30 MIN: CPT | Mod: 25 | Performed by: PHYSICIAN ASSISTANT

## 2024-10-18 PROCEDURE — 82728 ASSAY OF FERRITIN: CPT | Performed by: PHYSICIAN ASSISTANT

## 2024-10-18 PROCEDURE — 83550 IRON BINDING TEST: CPT | Performed by: PHYSICIAN ASSISTANT

## 2024-10-18 PROCEDURE — 90673 RIV3 VACCINE NO PRESERV IM: CPT | Performed by: PHYSICIAN ASSISTANT

## 2024-10-18 PROCEDURE — 83036 HEMOGLOBIN GLYCOSYLATED A1C: CPT | Performed by: PHYSICIAN ASSISTANT

## 2024-10-18 PROCEDURE — 85025 COMPLETE CBC W/AUTO DIFF WBC: CPT | Performed by: PHYSICIAN ASSISTANT

## 2024-10-18 PROCEDURE — 90480 ADMN SARSCOV2 VAC 1/ONLY CMP: CPT | Performed by: PHYSICIAN ASSISTANT

## 2024-10-18 PROCEDURE — 82306 VITAMIN D 25 HYDROXY: CPT | Performed by: PHYSICIAN ASSISTANT

## 2024-10-18 PROCEDURE — G0481 DRUG TEST DEF 8-14 CLASSES: HCPCS | Performed by: PHYSICIAN ASSISTANT

## 2024-10-18 PROCEDURE — 91320 SARSCV2 VAC 30MCG TRS-SUC IM: CPT | Performed by: PHYSICIAN ASSISTANT

## 2024-10-18 PROCEDURE — 83540 ASSAY OF IRON: CPT | Performed by: PHYSICIAN ASSISTANT

## 2024-10-18 PROCEDURE — 99396 PREV VISIT EST AGE 40-64: CPT | Mod: 25 | Performed by: PHYSICIAN ASSISTANT

## 2024-10-18 PROCEDURE — 90471 IMMUNIZATION ADMIN: CPT | Performed by: PHYSICIAN ASSISTANT

## 2024-10-18 PROCEDURE — 36415 COLL VENOUS BLD VENIPUNCTURE: CPT | Performed by: PHYSICIAN ASSISTANT

## 2024-10-18 RX ORDER — GABAPENTIN 100 MG/1
300-500 CAPSULE ORAL DAILY
Qty: 150 CAPSULE | Refills: 5 | Status: SHIPPED | OUTPATIENT
Start: 2024-10-18

## 2024-10-18 RX ORDER — GABAPENTIN 600 MG/1
300 TABLET ORAL DAILY
Qty: 90 TABLET | Refills: 1 | Status: CANCELLED | OUTPATIENT
Start: 2024-10-18

## 2024-10-18 RX ORDER — TRAMADOL HYDROCHLORIDE 50 MG/1
50 TABLET ORAL EVERY 6 HOURS PRN
Qty: 120 TABLET | Refills: 0 | Status: SHIPPED | OUTPATIENT
Start: 2024-10-18

## 2024-10-18 RX ORDER — TRAZODONE HYDROCHLORIDE 100 MG/1
TABLET ORAL
Qty: 90 TABLET | Refills: 3 | Status: SHIPPED | OUTPATIENT
Start: 2024-10-18

## 2024-10-18 RX ORDER — ARIPIPRAZOLE 5 MG/1
5 TABLET ORAL DAILY
Qty: 90 TABLET | Refills: 1 | Status: SHIPPED | OUTPATIENT
Start: 2024-10-18

## 2024-10-18 RX ORDER — FAMOTIDINE 40 MG/1
TABLET, FILM COATED ORAL
Qty: 90 TABLET | Refills: 3 | Status: SHIPPED | OUTPATIENT
Start: 2024-10-18

## 2024-10-18 SDOH — HEALTH STABILITY: PHYSICAL HEALTH: ON AVERAGE, HOW MANY DAYS PER WEEK DO YOU ENGAGE IN MODERATE TO STRENUOUS EXERCISE (LIKE A BRISK WALK)?: 5 DAYS

## 2024-10-18 SDOH — HEALTH STABILITY: PHYSICAL HEALTH: ON AVERAGE, HOW MANY MINUTES DO YOU ENGAGE IN EXERCISE AT THIS LEVEL?: 150+ MIN

## 2024-10-18 ASSESSMENT — PATIENT HEALTH QUESTIONNAIRE - PHQ9
SUM OF ALL RESPONSES TO PHQ QUESTIONS 1-9: 8
SUM OF ALL RESPONSES TO PHQ QUESTIONS 1-9: 8
10. IF YOU CHECKED OFF ANY PROBLEMS, HOW DIFFICULT HAVE THESE PROBLEMS MADE IT FOR YOU TO DO YOUR WORK, TAKE CARE OF THINGS AT HOME, OR GET ALONG WITH OTHER PEOPLE: SOMEWHAT DIFFICULT

## 2024-10-18 NOTE — PATIENT INSTRUCTIONS
Patient Education   Preventive Care Advice   This is general advice given by our system to help you stay healthy. However, your care team may have specific advice just for you. Please talk to your care team about your preventive care needs.  Nutrition  Eat 5 or more servings of fruits and vegetables each day.  Try wheat bread, brown rice and whole grain pasta (instead of white bread, rice, and pasta).  Get enough calcium and vitamin D. Check the label on foods and aim for 100% of the RDA (recommended daily allowance).  Lifestyle  Exercise at least 150 minutes each week  (30 minutes a day, 5 days a week).  Do muscle strengthening activities 2 days a week. These help control your weight and prevent disease.  No smoking.  Wear sunscreen to prevent skin cancer.  Have a dental exam and cleaning every 6 months.  Yearly exams  See your health care team every year to talk about:  Any changes in your health.  Any medicines your care team has prescribed.  Preventive care, family planning, and ways to prevent chronic diseases.  Shots (vaccines)   HPV shots (up to age 26), if you've never had them before.  Hepatitis B shots (up to age 59), if you've never had them before.  COVID-19 shot: Get this shot when it's due.  Flu shot: Get a flu shot every year.  Tetanus shot: Get a tetanus shot every 10 years.  Pneumococcal, hepatitis A, and RSV shots: Ask your care team if you need these based on your risk.  Shingles shot (for age 50 and up)  General health tests  Diabetes screening:  Starting at age 35, Get screened for diabetes at least every 3 years.  If you are younger than age 35, ask your care team if you should be screened for diabetes.  Cholesterol test: At age 39, start having a cholesterol test every 5 years, or more often if advised.  Bone density scan (DEXA): At age 50, ask your care team if you should have this scan for osteoporosis (brittle bones).  Hepatitis C: Get tested at least once in your life.  STIs (sexually  transmitted infections)  Before age 24: Ask your care team if you should be screened for STIs.  After age 24: Get screened for STIs if you're at risk. You are at risk for STIs (including HIV) if:  You are sexually active with more than one person.  You don't use condoms every time.  You or a partner was diagnosed with a sexually transmitted infection.  If you are at risk for HIV, ask about PrEP medicine to prevent HIV.  Get tested for HIV at least once in your life, whether you are at risk for HIV or not.  Cancer screening tests  Cervical cancer screening: If you have a cervix, begin getting regular cervical cancer screening tests starting at age 21.  Breast cancer scan (mammogram): If you've ever had breasts, begin having regular mammograms starting at age 40. This is a scan to check for breast cancer.  Colon cancer screening: It is important to start screening for colon cancer at age 45.  Have a colonoscopy test every 10 years (or more often if you're at risk) Or, ask your provider about stool tests like a FIT test every year or Cologuard test every 3 years.  To learn more about your testing options, visit:   .  For help making a decision, visit:   https://bit.ly/ol53015.  Prostate cancer screening test: If you have a prostate, ask your care team if a prostate cancer screening test (PSA) at age 55 is right for you.  Lung cancer screening: If you are a current or former smoker ages 50 to 80, ask your care team if ongoing lung cancer screenings are right for you.  For informational purposes only. Not to replace the advice of your health care provider. Copyright   2023 [facility]. All rights reserved. Clinically reviewed by the [facility] Transitions Program. Poll Everywhere 258669 - REV 01/24.  Learning About Stress  What is stress?     Stress is your body's response to a hard situation. Your body can have a physical, emotional, or mental response. Stress is a fact of life for most people, and it  affects everyone differently. What causes stress for you may not be stressful for someone else.  A lot of things can cause stress. You may feel stress when you go on a job interview, take a test, or run a race. This kind of short-term stress is normal and even useful. It can help you if you need to work hard or react quickly. For example, stress can help you finish an important job on time.  Long-term stress is caused by ongoing stressful situations or events. Examples of long-term stress include long-term health problems, ongoing problems at work, or conflicts in your family. Long-term stress can harm your health.  How does stress affect your health?  When you are stressed, your body responds as though you are in danger. It makes hormones that speed up your heart, make you breathe faster, and give you a burst of energy. This is called the fight-or-flight stress response. If the stress is over quickly, your body goes back to normal and no harm is done.  But if stress happens too often or lasts too long, it can have bad effects. Long-term stress can make you more likely to get sick, and it can make symptoms of some diseases worse. If you tense up when you are stressed, you may develop neck, shoulder, or low back pain. Stress is linked to high blood pressure and heart disease.  Stress also harms your emotional health. It can make you brownlee, tense, or depressed. Your relationships may suffer, and you may not do well at work or school.  What can you do to manage stress?  You can try these things to help manage stress:   Do something active. Exercise or activity can help reduce stress. Walking is a great way to get started. Even everyday activities such as housecleaning or yard work can help.  Try yoga or son chi. These techniques combine exercise and meditation. You may need some training at first to learn them.  Do something you enjoy. For example, listen to music or go to a movie. Practice your hobby or do volunteer  "work.  Meditate. This can help you relax, because you are not worrying about what happened before or what may happen in the future.  Do guided imagery. Imagine yourself in any setting that helps you feel calm. You can use online videos, books, or a teacher to guide you.  Do breathing exercises. For example:  From a standing position, bend forward from the waist with your knees slightly bent. Let your arms dangle close to the floor.  Breathe in slowly and deeply as you return to a standing position. Roll up slowly and lift your head last.  Hold your breath for just a few seconds in the standing position.  Breathe out slowly and bend forward from the waist.  Let your feelings out. Talk, laugh, cry, and express anger when you need to. Talking with supportive friends or family, a counselor, or a sara leader about your feelings is a healthy way to relieve stress. Avoid discussing your feelings with people who make you feel worse.  Write. It may help to write about things that are bothering you. This helps you find out how much stress you feel and what is causing it. When you know this, you can find better ways to cope.  What can you do to prevent stress?  You might try some of these things to help prevent stress:  Manage your time. This helps you find time to do the things you want and need to do.  Get enough sleep. Your body recovers from the stresses of the day while you are sleeping.  Get support. Your family, friends, and community can make a difference in how you experience stress.  Limit your news feed. Avoid or limit time on social media or news that may make you feel stressed.  Do something active. Exercise or activity can help reduce stress. Walking is a great way to get started.  Where can you learn more?  Go to https://www.[employer].net/patiented  Enter N032 in the search box to learn more about \"Learning About Stress.\"  Current as of: October 24, 2023  Content Version: 14.2 2024 [employer]. "   Care instructions adapted under license by your healthcare professional. If you have questions about a medical condition or this instruction, always ask your healthcare professional. Healthwise, [facility] disclaims any warranty or liability for your use of this information.    Learning About Depression Screening  What is depression screening?  Depression screening is a way to see if you have depression symptoms. It may be done by a doctor or counselor. It's often part of a routine checkup. That's because your mental health is just as important as your physical health.  Depression is a mental health condition that affects how you feel, think, and act. You may:  Have less energy.  Lose interest in your daily activities.  Feel sad and grouchy for a long time.  Depression is very common. It affects people of all ages.  Many things can lead to depression. Some people become depressed after they have a stroke or find out they have a major illness like cancer or heart disease. The death of a loved one or a breakup may lead to depression. It can run in families. Most experts believe that a combination of inherited genes and stressful life events can cause it.  What happens during screening?  You may be asked to fill out a form about your depression symptoms. You and the doctor will discuss your answers. The doctor may ask you more questions to learn more about how you think, act, and feel.  What happens after screening?  If you have symptoms of depression, your doctor will talk to you about your options.  Doctors usually treat depression with medicines or counseling. Often, combining the two works best. Many people don't get help because they think that they'll get over the depression on their own. But people with depression may not get better unless they get treatment.  The cause of depression is not well understood. There may be many factors involved. But if you have depression, it's not your fault.  A serious  "symptom of depression is thinking about death or suicide. If you or someone you care about talks about this or about feeling hopeless, get help right away.  It's important to know that depression can be treated. Medicine, counseling, and self-care may help.  Where can you learn more?  Go to https://www."[employer].net/patiented  Enter T185 in the search box to learn more about \"Learning About Depression Screening.\"  Current as of: June 24, 2023  Content Version: 14.2 2024 [facility].   Care instructions adapted under license by your healthcare professional. If you have questions about a medical condition or this instruction, always ask your healthcare professional. [employer] disclaims any warranty or liability for your use of this information.       "
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
## 2024-11-04 DIAGNOSIS — J30.89 CHRONIC NON-SEASONAL ALLERGIC RHINITIS: ICD-10-CM

## 2024-11-04 RX ORDER — PSEUDOEPHEDRINE HCL 120 MG/1
1 TABLET, FILM COATED, EXTENDED RELEASE ORAL 2 TIMES DAILY
Qty: 60 TABLET | Refills: 5 | Status: SHIPPED | OUTPATIENT
Start: 2024-11-04

## 2024-12-01 DIAGNOSIS — M12.532 TRAUMATIC ARTHRITIS OF LEFT WRIST: ICD-10-CM

## 2024-12-02 RX ORDER — TRAMADOL HYDROCHLORIDE 50 MG/1
50 TABLET ORAL EVERY 6 HOURS PRN
Qty: 120 TABLET | Refills: 0 | Status: SHIPPED | OUTPATIENT
Start: 2024-12-02

## 2024-12-09 ENCOUNTER — VIRTUAL VISIT (OUTPATIENT)
Dept: FAMILY MEDICINE | Facility: CLINIC | Age: 64
End: 2024-12-09
Payer: COMMERCIAL

## 2024-12-09 DIAGNOSIS — M12.532 TRAUMATIC ARTHRITIS OF LEFT WRIST: Primary | ICD-10-CM

## 2024-12-09 PROCEDURE — 98966 PH1 ASSMT&MGMT NQHP 5-10: CPT | Mod: 93 | Performed by: PHYSICIAN ASSISTANT

## 2024-12-09 NOTE — PROGRESS NOTES
Nataliia is a 64 year old who is being evaluated via a billable telephone visit.    What phone number would you like to be contacted at? [phone]   How would you like to obtain your AVS? MyChart  Originating Location (pt. Location): Home    Distant Location (provider location):  On-site    Assessment & Plan     Traumatic arthritis of left wrist  Work demands are interfering with her quality of life and causing more pain. Agree with accommodations for no more than 32 hours per week.   Letter created.   Meg Bernardo PA-C                   Subjective   Nataliia is a 64 year old, presenting for the following health issues:  Letter for School/Work      12/9/2024     1:08 PM   Additional Questions   Roomed by dee garcia         12/9/2024   Forms   Any forms needing to be completed Yes        History of Present Illness       Reason for visit:  To see if I can get reduced hours for my job   She is taking medications regularly.     Letter for work.    Wants to reduce her hours because her hands have been bothering her more.   Wants to go to 32 hours per week. She has Sun-Thursday right now.   Has not been doing much overtime. Overtime can mandatory.     She has to have a device on her arm and really bothers her. It used to be a headset, but by the end of the shift she is in pain.   No changes in pain medication usage.     Spoke with her HR department last year about this but they declined to give her a day off.                   Objective           Vitals:  No vitals were obtained today due to virtual visit.    Physical Exam   General: Alert and no distress //Respiratory: No audible wheeze, cough, or shortness of breath // Psychiatric:  Appropriate affect, tone, and pace of words            Phone call duration: 6 minutes  Signed Electronically by: Meg Bernardo PA-C

## 2024-12-09 NOTE — LETTER
December 9, 2024      Nataliia Flanagan  [address] [address]        To Whom It May Concern:    Nataliia Flanagan is a patient under my care. She has a chronic medical diagnosis which is impacting her overall health. Due to this medical condition she should reduce her hours to no more than 32 hours per week. She should not work more than this including overtime.         Sincerely,        Meg Bernardo PA-C

## 2024-12-18 DIAGNOSIS — F33.1 MAJOR DEPRESSIVE DISORDER, RECURRENT EPISODE, MODERATE (H): ICD-10-CM

## 2025-01-12 DIAGNOSIS — M12.532 TRAUMATIC ARTHRITIS OF LEFT WRIST: ICD-10-CM

## 2025-01-13 RX ORDER — TRAMADOL HYDROCHLORIDE 50 MG/1
50 TABLET ORAL EVERY 6 HOURS PRN
Qty: 120 TABLET | Refills: 0 | Status: SHIPPED | OUTPATIENT
Start: 2025-01-13

## 2025-02-17 DIAGNOSIS — M12.532 TRAUMATIC ARTHRITIS OF LEFT WRIST: ICD-10-CM

## 2025-02-17 RX ORDER — TRAMADOL HYDROCHLORIDE 50 MG/1
50 TABLET ORAL EVERY 6 HOURS PRN
Qty: 120 TABLET | Refills: 0 | Status: SHIPPED | OUTPATIENT
Start: 2025-02-17

## 2025-03-03 NOTE — TELEPHONE ENCOUNTER
Per - Discussed xray results, rx and instructions. Pt verbalized understanding- No further action needed    Routing refill request to provider for review/approval because:  Drug not on the [facility] refill protocol   Blood Pressure and Labs out of range:  LDL, WBC  Labs not current:  Lipid Panel, CBC, A1C  A break in medication?    Requested Prescriptions   Pending Prescriptions Disp Refills     ARIPiprazole (ABILIFY) 5 MG tablet [Pharmacy Med Name: ARIPIPRAZOLE 5 MG TABLET] 90 tablet 1     Sig: TAKE 1 TABLET BY MOUTH EVERY DAY       Antipsychotic Medications Failed - 1/4/2022 12:57 PM        Failed - Blood pressure under 140/90 in past 12 months     BP Readings from Last 3 Encounters:   04/27/21 (!) 140/80   01/14/21 (!) 142/80   01/11/21 (!) 140/90                 Failed - Lipid panel on file within the past 12 months     Recent Labs   Lab Test 07/20/18  1442   CHOL 194   TRIG 67   HDL 78   *   NHDL 116               Failed - CBC on file in past 12 months     Recent Labs   Lab Test 02/28/17  1122   WBC 3.8*   RBC 4.00   HGB 11.9   HCT 35.5                Failed - A1c or Glucose on file in past 12 months     Recent Labs   Lab Test 07/20/18  1442   GLC 87       Please review patients last 3 weights. If a weight gain of >10 lbs exists, you may refill the prescription once after instructing the patient to schedule an appointment within the next 30 days.    Wt Readings from Last 3 Encounters:   04/27/21 47.6 kg (105 lb)   01/14/21 48.1 kg (106 lb)   01/11/21 49.7 kg (109 lb 9.6 oz)             Passed - Patient is 12 years of age or older        Passed - Heart Rate on file within past 12 months     Pulse Readings from Last 3 Encounters:   04/30/21 90   04/27/21 80   01/14/21 89               Passed - Medication is active on med list        Passed - Patient is not pregnant        Passed - No positve pregnancy test on file in past 12 months        Passed - Recent (6 mo) or future (30 days) visit within the authorizing provider's specialty     Patient had office visit in the last 6 months or has a visit in the next 30  "days with authorizing provider or within the authorizing provider's specialty.  See \"Patient Info\" tab in inbasket, or \"Choose Columns\" in Meds & Orders section of the refill encounter.               traMADol (ULTRAM) 50 MG tablet [Pharmacy Med Name: TRAMADOL HCL 50 MG TABLET] 120 tablet 0     Sig: TAKE 1 TABLET (50 MG) BY MOUTH EVERY 6 HOURS AS NEEDED FOR SEVERE PAIN       There is no refill protocol information for this order        Aranza Garnica RN on 1/6/2022 at 10:47 AM    "

## 2025-03-19 ENCOUNTER — PATIENT OUTREACH (OUTPATIENT)
Dept: CARE COORDINATION | Facility: CLINIC | Age: 65
End: 2025-03-19
Payer: COMMERCIAL

## 2025-03-20 DIAGNOSIS — Z12.11 COLON CANCER SCREENING: ICD-10-CM

## 2025-04-03 ENCOUNTER — ORDERS ONLY (AUTO-RELEASED) (OUTPATIENT)
Dept: URGENT CARE | Facility: CLINIC | Age: 65
End: 2025-04-03
Payer: COMMERCIAL

## 2025-04-03 DIAGNOSIS — Z12.11 COLON CANCER SCREENING: ICD-10-CM

## 2025-04-08 ENCOUNTER — OFFICE VISIT (OUTPATIENT)
Dept: FAMILY MEDICINE | Facility: CLINIC | Age: 65
End: 2025-04-08
Payer: COMMERCIAL

## 2025-04-08 VITALS
BODY MASS INDEX: 20.52 KG/M2 | WEIGHT: 115.8 LBS | OXYGEN SATURATION: 97 % | DIASTOLIC BLOOD PRESSURE: 79 MMHG | TEMPERATURE: 97.5 F | HEART RATE: 76 BPM | RESPIRATION RATE: 14 BRPM | HEIGHT: 63 IN | SYSTOLIC BLOOD PRESSURE: 129 MMHG

## 2025-04-08 DIAGNOSIS — F33.1 MAJOR DEPRESSIVE DISORDER, RECURRENT EPISODE, MODERATE (H): ICD-10-CM

## 2025-04-08 DIAGNOSIS — F11.20 CONTINUOUS OPIOID DEPENDENCE (H): ICD-10-CM

## 2025-04-08 DIAGNOSIS — H25.9 AGE-RELATED CATARACT OF BOTH EYES, UNSPECIFIED AGE-RELATED CATARACT TYPE: ICD-10-CM

## 2025-04-08 DIAGNOSIS — Z01.818 PREOP GENERAL PHYSICAL EXAM: Primary | ICD-10-CM

## 2025-04-08 PROCEDURE — 96127 BRIEF EMOTIONAL/BEHAV ASSMT: CPT | Performed by: FAMILY MEDICINE

## 2025-04-08 PROCEDURE — 3078F DIAST BP <80 MM HG: CPT | Performed by: FAMILY MEDICINE

## 2025-04-08 PROCEDURE — 99214 OFFICE O/P EST MOD 30 MIN: CPT | Performed by: FAMILY MEDICINE

## 2025-04-08 PROCEDURE — 3074F SYST BP LT 130 MM HG: CPT | Performed by: FAMILY MEDICINE

## 2025-04-08 ASSESSMENT — ANXIETY QUESTIONNAIRES
5. BEING SO RESTLESS THAT IT IS HARD TO SIT STILL: SEVERAL DAYS
1. FEELING NERVOUS, ANXIOUS, OR ON EDGE: SEVERAL DAYS
GAD7 TOTAL SCORE: 7
IF YOU CHECKED OFF ANY PROBLEMS ON THIS QUESTIONNAIRE, HOW DIFFICULT HAVE THESE PROBLEMS MADE IT FOR YOU TO DO YOUR WORK, TAKE CARE OF THINGS AT HOME, OR GET ALONG WITH OTHER PEOPLE: VERY DIFFICULT
7. FEELING AFRAID AS IF SOMETHING AWFUL MIGHT HAPPEN: SEVERAL DAYS
4. TROUBLE RELAXING: SEVERAL DAYS
2. NOT BEING ABLE TO STOP OR CONTROL WORRYING: SEVERAL DAYS
3. WORRYING TOO MUCH ABOUT DIFFERENT THINGS: SEVERAL DAYS
6. BECOMING EASILY ANNOYED OR IRRITABLE: SEVERAL DAYS
GAD7 TOTAL SCORE: 7

## 2025-04-08 NOTE — PATIENT INSTRUCTIONS
How to Take Your Medication Before Surgery  Preoperative Medication Instructions          Patient Education   Preparing for Your Surgery  For Adults  Getting started  In most cases, a nurse will call to review your health history and instructions. They will give you an arrival time based on your scheduled surgery time. Please be ready to share:  Your doctor's clinic name and phone number  Your medical, surgical, and anesthesia history  A list of allergies and sensitivities  A list of medicines, including herbal treatments and over-the-counter drugs  Whether the patient has a legal guardian (ask how to send us the papers in advance)  Note: You may not receive a call if you were seen at our PAC (Preoperative Assessment Center).  Please tell us if you're pregnant--or if there's any chance you might be pregnant. Some surgeries may injure a fetus (unborn baby), so they require a pregnancy test. Surgeries that are safe for a fetus don't always need a test, and you can choose whether to have one.   Preparing for surgery  Within 10 to 30 days of surgery: Have a pre-op exam (sometimes called an H&P, or History and Physical). This can be done at a clinic or pre-operative center.  If you're having a , you may not need this exam. Talk to your care team.  At your pre-op exam, talk to your care team about all medicines you take. (This includes CBD oil and any drugs, such as THC, marijuana, and other forms of cannabis.) If you need to stop any medicine before surgery, ask when to start taking it again.  This is for your safety. Many medicines and drugs can make you bleed too much during surgery. Some change how well surgery (anesthesia) drugs work.  Call your insurance company to let them know you're having surgery. (If you don't have insurance, call [phone].)  Call your clinic if there's any change in your health. This includes a scrape or scratch near the surgery site, or any signs of a cold (sore throat, runny  nose, cough, rash, fever).  Eating and drinking guidelines  For your safety: Unless your surgeon tells you otherwise, follow the guidelines below.  Eat and drink as normal until 8 hours before you arrive for surgery. After that, no food or milk. You can spit out gum when you arrive.  Drink clear liquids until 2 hours before you arrive. These are liquids you can see through, like water, Gatorade, and Propel Water. They also include plain black coffee and tea (no cream or milk).  No alcohol for 24 hours before you arrive. The night before surgery, stop any drinks that contain THC.  If your care team tells you to take medicine on the morning of surgery, it's okay to take it with a sip of water. No other medicines or drugs are allowed (including CBD oil)--follow your care team's instructions.  If you have questions the day of surgery, call your hospital or surgery center.   Preventing infection  Shower or bathe the night before and the morning of surgery. Follow the instructions your clinic gave you. (If no instructions, use regular soap.)  Don't shave or clip hair near your surgery site. We'll remove the hair if needed.  Don't smoke or vape the morning of surgery. No chewing tobacco for 6 hours before you arrive. A nicotine patch is okay. You may spit out nicotine gum when you arrive.  For some surgeries, the surgeon will tell you to fully quit smoking and nicotine.  We will make every effort to keep you safe from infection. We will:  Clean our hands often with soap and water (or an alcohol-based hand rub).  Clean the skin at your surgery site with a special soap that kills germs.  Give you a special gown to keep you warm. (Cold raises the risk of infection.)  Wear hair covers, masks, gowns, and gloves during surgery.  Give antibiotic medicine, if prescribed. Not all surgeries need this medicine.  What to bring on the day of surgery  Photo ID and insurance card  Copy of your health care directive, if you have  one  Glasses and hearing aids (bring cases)  You can't wear contacts during surgery  Inhaler and eye drops, if you use them (tell us about these when you arrive)  CPAP machine or breathing device, if you use them  A few personal items, if spending the night  If you have . . .  A pacemaker, ICD (cardiac defibrillator), or other implant: Bring the ID card.  An implanted stimulator: Bring the remote control.  A legal guardian: Bring a copy of the certified (court-stamped) guardianship papers.  Please remove any jewelry, including body piercings. Leave jewelry and other valuables at home.  If you're going home the day of surgery  You must have a responsible adult drive you home. They should stay with you overnight as well.  If you don't have someone to stay with you, and you aren't safe to go home alone, we may keep you overnight. Insurance often won't pay for this.  After surgery  If it's hard to control your pain or you need more pain medicine, please call your surgeon's office.  Questions?   If you have any questions for your care team, list them here:   ____________________________________________________________________________________________________________________________________________________________________________________________________________________________________________________________  For informational purposes only. Not to replace the advice of your health care provider. Copyright   2003, 2019 [facility]. All rights reserved. Clinically reviewed by Rayo Lindquist MD. Setred [id number] - REV 08/24.

## 2025-05-06 DIAGNOSIS — M12.532 TRAUMATIC ARTHRITIS OF LEFT WRIST: ICD-10-CM

## 2025-05-07 RX ORDER — TRAMADOL HYDROCHLORIDE 50 MG/1
50 TABLET ORAL EVERY 6 HOURS PRN
Qty: 120 TABLET | Refills: 0 | Status: SHIPPED | OUTPATIENT
Start: 2025-05-07

## 2025-05-18 DIAGNOSIS — F33.1 MAJOR DEPRESSIVE DISORDER, RECURRENT EPISODE, MODERATE (H): ICD-10-CM

## 2025-05-19 RX ORDER — ARIPIPRAZOLE 5 MG/1
5 TABLET ORAL DAILY
Qty: 90 TABLET | Refills: 0 | Status: SHIPPED | OUTPATIENT
Start: 2025-05-19

## 2025-05-24 ASSESSMENT — ANXIETY QUESTIONNAIRES
8. IF YOU CHECKED OFF ANY PROBLEMS, HOW DIFFICULT HAVE THESE MADE IT FOR YOU TO DO YOUR WORK, TAKE CARE OF THINGS AT HOME, OR GET ALONG WITH OTHER PEOPLE?: VERY DIFFICULT
GAD7 TOTAL SCORE: 7
3. WORRYING TOO MUCH ABOUT DIFFERENT THINGS: SEVERAL DAYS
5. BEING SO RESTLESS THAT IT IS HARD TO SIT STILL: SEVERAL DAYS
1. FEELING NERVOUS, ANXIOUS, OR ON EDGE: SEVERAL DAYS
GAD7 TOTAL SCORE: 7
GAD7 TOTAL SCORE: 7
7. FEELING AFRAID AS IF SOMETHING AWFUL MIGHT HAPPEN: SEVERAL DAYS
6. BECOMING EASILY ANNOYED OR IRRITABLE: SEVERAL DAYS
2. NOT BEING ABLE TO STOP OR CONTROL WORRYING: SEVERAL DAYS
4. TROUBLE RELAXING: SEVERAL DAYS
7. FEELING AFRAID AS IF SOMETHING AWFUL MIGHT HAPPEN: SEVERAL DAYS
IF YOU CHECKED OFF ANY PROBLEMS ON THIS QUESTIONNAIRE, HOW DIFFICULT HAVE THESE PROBLEMS MADE IT FOR YOU TO DO YOUR WORK, TAKE CARE OF THINGS AT HOME, OR GET ALONG WITH OTHER PEOPLE: VERY DIFFICULT

## 2025-05-29 ENCOUNTER — OFFICE VISIT (OUTPATIENT)
Dept: FAMILY MEDICINE | Facility: CLINIC | Age: 65
End: 2025-05-29
Payer: COMMERCIAL

## 2025-05-29 VITALS
SYSTOLIC BLOOD PRESSURE: 139 MMHG | BODY MASS INDEX: 20.91 KG/M2 | WEIGHT: 118 LBS | RESPIRATION RATE: 16 BRPM | HEART RATE: 76 BPM | HEIGHT: 63 IN | DIASTOLIC BLOOD PRESSURE: 84 MMHG | TEMPERATURE: 97.8 F | OXYGEN SATURATION: 98 %

## 2025-05-29 DIAGNOSIS — M12.532 TRAUMATIC ARTHRITIS OF LEFT WRIST: ICD-10-CM

## 2025-05-29 DIAGNOSIS — F33.1 MAJOR DEPRESSIVE DISORDER, RECURRENT EPISODE, MODERATE (H): ICD-10-CM

## 2025-05-29 DIAGNOSIS — M19.132 POST-TRAUMATIC OSTEOARTHRITIS OF LEFT WRIST: ICD-10-CM

## 2025-05-29 DIAGNOSIS — J30.89 CHRONIC NON-SEASONAL ALLERGIC RHINITIS: ICD-10-CM

## 2025-05-29 RX ORDER — GABAPENTIN 100 MG/1
300 CAPSULE ORAL DAILY
Qty: 90 CAPSULE | Refills: 5 | Status: SHIPPED | OUTPATIENT
Start: 2025-05-29

## 2025-05-29 RX ORDER — TRAMADOL HYDROCHLORIDE 50 MG/1
50 TABLET ORAL EVERY 6 HOURS PRN
Qty: 120 TABLET | Refills: 0 | Status: SHIPPED | OUTPATIENT
Start: 2025-06-04

## 2025-05-29 RX ORDER — PSEUDOEPHEDRINE HCL 120 MG/1
1 TABLET, FILM COATED, EXTENDED RELEASE ORAL 2 TIMES DAILY
Qty: 60 TABLET | Refills: 5 | Status: SHIPPED | OUTPATIENT
Start: 2025-05-29

## 2025-05-29 RX ORDER — ARIPIPRAZOLE 5 MG/1
5 TABLET ORAL DAILY
Qty: 90 TABLET | Refills: 1 | Status: SHIPPED | OUTPATIENT
Start: 2025-05-29

## 2025-05-29 ASSESSMENT — PATIENT HEALTH QUESTIONNAIRE - PHQ9
SUM OF ALL RESPONSES TO PHQ QUESTIONS 1-9: 7
SUM OF ALL RESPONSES TO PHQ QUESTIONS 1-9: 7
10. IF YOU CHECKED OFF ANY PROBLEMS, HOW DIFFICULT HAVE THESE PROBLEMS MADE IT FOR YOU TO DO YOUR WORK, TAKE CARE OF THINGS AT HOME, OR GET ALONG WITH OTHER PEOPLE: VERY DIFFICULT

## 2025-05-29 NOTE — PROGRESS NOTES
Assessment & Plan     Major depressive disorder, recurrent episode, moderate (H)  Stable. No changes. Patient will reach out if she feels she needs an adjustment.   - ARIPiprazole (ABILIFY) 5 MG tablet; Take 1 tablet (5 mg) by mouth daily.    Post-traumatic osteoarthritis of left wrist  Planning to decrease work hours later this year, should help with pain. Continue same plan. Follow up in October for physical.   - gabapentin (NEURONTIN) 100 MG capsule; Take 3 capsules (300 mg) by mouth daily.    Traumatic arthritis of left wrist  - gabapentin (NEURONTIN) 100 MG capsule; Take 3 capsules (300 mg) by mouth daily.  - traMADol (ULTRAM) 50 MG tablet; Take 1 tablet (50 mg) by mouth every 6 hours as needed for severe pain.    Chronic non-seasonal allergic rhinitis  Discussed increased blood pressure on this, patient will use only as needed for severe symptoms and we will continue to monitor blood pressure.   - pseudoePHEDrine (SUDAFED) 120 MG 12 hr tablet; Take 1 tablet (120 mg) by mouth 2 times daily.      The longitudinal plan of care for the diagnosis(es)/condition(s) as documented were addressed during this visit. Due to the added complexity in care, I will continue to support Nataliia in the subsequent management and with ongoing continuity of care.          Subjective   Nataliia is a 64 year old, presenting for the following health issues:  Follow Up        5/29/2025     1:03 PM   Additional Questions   Roomed by Clay     History of Present Illness       Mental Health Follow-up:  Patient presents to follow-up on Depression & Anxiety.Patient's depression since last visit has been:  Medium  The patient is not having other symptoms associated with depression.  Patient's anxiety since last visit has been:  Medium  The patient is not having other symptoms associated with anxiety.  Any significant life events: No  Patient is feeling anxious or having panic attacks.  Patient has no concerns about alcohol or drug  "use.    Reason for visit:  To update my medications    She eats 0-1 servings of fruits and vegetables daily.She consumes 3 sweetened beverage(s) daily.She exercises with enough effort to increase her heart rate 60 or more minutes per day.  She exercises with enough effort to increase her heart rate 5 days per week.   She is taking medications regularly.      On average takes 3 gabapentin per day. Does not make her too tired.     Still working. Will retire in November but really go down to 3 days per week.   Pain has been tolerable. Notes that when the scanner is on her arm it can be really painful.   Has been sleeping well. Trazodone nightly.   Allergies have been bad- needing the sudafed. Work with a lot of dust and dirt.     Does not want to adjust medications for her mood.                 Objective    /84   Pulse 76   Temp 97.8  F (36.6  C) (Temporal)   Resp 16   Ht 1.59 m (5' 2.6\")   Wt 53.5 kg (118 lb)   SpO2 98%   BMI 21.17 kg/m    Body mass index is 21.17 kg/m .  Physical Exam   GENERAL: alert and no distress  CV: regular rate and rhythm, normal S1 S2, no S3 or S4, no murmur, click or rub, no peripheral edema   MS: no gross musculoskeletal defects noted, no edema- pain with palpation of the left wrist. Normal  strength bilaterally. Full range of motion of the left hand.   SKIN: no suspicious lesions or rashes  NEURO: Normal strength and tone, mentation intact and speech normal  PSYCH: mentation appears normal, affect flat            Signed Electronically by: Meg Bernardo PA-C    "

## 2025-05-29 NOTE — LETTER

## 2025-06-09 ENCOUNTER — VIRTUAL VISIT (OUTPATIENT)
Dept: FAMILY MEDICINE | Facility: CLINIC | Age: 65
End: 2025-06-09
Payer: COMMERCIAL

## 2025-06-09 DIAGNOSIS — B35.1 ONYCHOMYCOSIS: ICD-10-CM

## 2025-06-09 DIAGNOSIS — B35.1 ONYCHOMYCOSIS: Primary | ICD-10-CM

## 2025-06-09 PROCEDURE — 98016 BRIEF COMUNICAJ TECH-BSD SVC: CPT | Performed by: PHYSICIAN ASSISTANT

## 2025-06-09 RX ORDER — TERBINAFINE HYDROCHLORIDE 250 MG/1
250 TABLET ORAL DAILY
Qty: 90 TABLET | Refills: 0 | Status: SHIPPED | OUTPATIENT
Start: 2025-06-09 | End: 2025-06-09

## 2025-06-09 RX ORDER — TERBINAFINE HYDROCHLORIDE 250 MG/1
1 TABLET ORAL DAILY
Qty: 90 TABLET | Refills: 0 | Status: SHIPPED | OUTPATIENT
Start: 2025-06-09

## 2025-06-09 NOTE — PROGRESS NOTES
Nataliia is a 64 year old who is being evaluated via a billable telephone visit.    What phone number would you like to be contacted at? [phone] (H)   How would you like to obtain your AVS? MyChart  Originating Location (pt. Location): Home    Distant Location (provider location):  On-site  Telephone visit completed due to the patient did not have access to video, while the distant provider did.    Assessment & Plan     Onychomycosis  Discussed risks and benefits of treating. Discussed metabolized through the liver and limiting liver pathway meds and alcohol. If not improving with this treatment consider a nail cutting for confirmation.   - terbinafine (LAMISIL) 250 MG tablet; Take 1 tablet (250 mg) by mouth daily.    The longitudinal plan of care for the diagnosis(es)/condition(s) as documented were addressed during this visit. Due to the added complexity in care, I will continue to support Nataliia in the subsequent management and with ongoing continuity of care.            Subjective   Nataliia is a 64 year old, presenting for the following health issues:  Nail Problem        5/29/2025     1:03 PM   Additional Questions   Roomed by Clay     History of Present Illness       Reason for visit:  Represcribe Terbafine (for toenail fungus)    She eats 0-1 servings of fruits and vegetables daily.She consumes 3 sweetened beverage(s) daily.She exercises with enough effort to increase her heart rate 60 or more minutes per day.  She exercises with enough effort to increase her heart rate 5 days per week.   She is taking medications regularly.      Right big toe- improved with last treatment 2 years ago.   Notes the base is cleared some but the tip is still thick and bad.                 Objective           Vitals:  No vitals were obtained today due to virtual visit.    Physical Exam   General: Alert and no distress //Respiratory: No audible wheeze, cough, or shortness of breath // Psychiatric:  Appropriate affect, tone,  and pace of words            Phone call duration: 6 minutes  Signed Electronically by: Meg Bernardo PA-C

## 2025-07-23 DIAGNOSIS — M12.532 TRAUMATIC ARTHRITIS OF LEFT WRIST: ICD-10-CM

## 2025-07-24 RX ORDER — TRAMADOL HYDROCHLORIDE 50 MG/1
50 TABLET ORAL EVERY 6 HOURS PRN
Qty: 120 TABLET | Refills: 0 | Status: SHIPPED | OUTPATIENT
Start: 2025-07-24

## 2025-08-08 PROBLEM — F11.20 CONTINUOUS OPIOID DEPENDENCE (H): Status: ACTIVE | Noted: 2019-07-15

## 2025-08-08 PROBLEM — S22.20XD CLOSED FRACTURE OF STERNUM WITH ROUTINE HEALING, UNSPECIFIED PORTION OF STERNUM, SUBSEQUENT ENCOUNTER: Status: RESOLVED | Noted: 2024-07-03 | Resolved: 2025-08-08
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
## 2025-09-04 DIAGNOSIS — M12.532 TRAUMATIC ARTHRITIS OF LEFT WRIST: ICD-10-CM

## 2025-09-04 RX ORDER — TRAMADOL HYDROCHLORIDE 50 MG/1
50 TABLET ORAL EVERY 6 HOURS PRN
Qty: 120 TABLET | Refills: 0 | Status: SHIPPED | OUTPATIENT
Start: 2025-09-04